# Patient Record
Sex: FEMALE | Race: WHITE | Employment: OTHER | URBAN - METROPOLITAN AREA
[De-identification: names, ages, dates, MRNs, and addresses within clinical notes are randomized per-mention and may not be internally consistent; named-entity substitution may affect disease eponyms.]

---

## 2021-05-25 PROBLEM — N17.9 ACUTE RENAL FAILURE (ARF) (HCC): Status: ACTIVE | Noted: 2021-05-25

## 2021-05-27 ENCOUNTER — APPOINTMENT (OUTPATIENT)
Dept: MRI IMAGING | Age: 76
DRG: 456 | End: 2021-05-27
Attending: INTERNAL MEDICINE
Payer: MEDICARE

## 2021-05-27 ENCOUNTER — APPOINTMENT (OUTPATIENT)
Dept: ULTRASOUND IMAGING | Age: 76
DRG: 456 | End: 2021-05-27
Attending: INTERNAL MEDICINE
Payer: MEDICARE

## 2021-05-27 ENCOUNTER — HOSPITAL ENCOUNTER (INPATIENT)
Age: 76
LOS: 14 days | Discharge: NURSING, REHAB OR LONG-TERM CARE | DRG: 456 | End: 2021-06-10
Attending: INTERNAL MEDICINE | Admitting: INTERNAL MEDICINE
Payer: MEDICARE

## 2021-05-27 DIAGNOSIS — M84.48XA PATHOLOGICAL FRACTURE OF LUMBAR VERTEBRA, INITIAL ENCOUNTER: ICD-10-CM

## 2021-05-27 DIAGNOSIS — G95.29 SPINAL CORD COMPRESSION DUE TO MALIGNANT NEOPLASM METASTATIC TO SPINE (HCC): ICD-10-CM

## 2021-05-27 DIAGNOSIS — C79.51 SPINAL CORD COMPRESSION DUE TO MALIGNANT NEOPLASM METASTATIC TO SPINE (HCC): ICD-10-CM

## 2021-05-27 DIAGNOSIS — S32.020A COMPRESSION FRACTURE OF L2 VERTEBRA, INITIAL ENCOUNTER (HCC): Primary | ICD-10-CM

## 2021-05-27 DIAGNOSIS — C90.00 MULTIPLE MYELOMA NOT HAVING ACHIEVED REMISSION (HCC): ICD-10-CM

## 2021-05-27 PROBLEM — I10 ESSENTIAL HYPERTENSION: Status: ACTIVE | Noted: 2021-05-27

## 2021-05-27 PROBLEM — E03.8 OTHER SPECIFIED HYPOTHYROIDISM: Status: ACTIVE | Noted: 2021-05-27

## 2021-05-27 PROBLEM — S32.000A COMPRESSION FRACTURE OF LUMBAR VERTEBRA (HCC): Status: ACTIVE | Noted: 2021-05-27

## 2021-05-27 PROBLEM — E78.49 OTHER HYPERLIPIDEMIA: Status: ACTIVE | Noted: 2021-05-27

## 2021-05-27 LAB
-: ABNORMAL
ABSOLUTE RETIC #: 0.05 M/UL (ref 0.03–0.08)
ALBUMIN SERPL-MCNC: 3.2 G/DL (ref 3.5–5.2)
ALBUMIN/GLOBULIN RATIO: 0.8 (ref 1–2.5)
ALP BLD-CCNC: 52 U/L (ref 35–104)
ALT SERPL-CCNC: 11 U/L (ref 5–33)
AMORPHOUS: ABNORMAL
ANION GAP SERPL CALCULATED.3IONS-SCNC: 14 MMOL/L (ref 9–17)
AST SERPL-CCNC: 16 U/L
BACTERIA: ABNORMAL
BILIRUB SERPL-MCNC: 0.24 MG/DL (ref 0.3–1.2)
BILIRUBIN URINE: NEGATIVE
BUN BLDV-MCNC: 27 MG/DL (ref 8–23)
BUN/CREAT BLD: ABNORMAL (ref 9–20)
CALCIUM SERPL-MCNC: 9.1 MG/DL (ref 8.6–10.4)
CASTS UA: ABNORMAL /LPF (ref 0–8)
CHLORIDE BLD-SCNC: 105 MMOL/L (ref 98–107)
CO2: 23 MMOL/L (ref 20–31)
COLOR: ABNORMAL
CREAT SERPL-MCNC: 1.53 MG/DL (ref 0.5–0.9)
CREATININE URINE: 54.7 MG/DL (ref 28–217)
CRYSTALS, UA: ABNORMAL /HPF
EPITHELIAL CELLS UA: ABNORMAL /HPF (ref 0–5)
FERRITIN: 292 UG/L (ref 13–150)
FOLATE: >20 NG/ML
FREE KAPPA/LAMBDA RATIO: 0.01 (ref 0.26–1.65)
GFR AFRICAN AMERICAN: 40 ML/MIN
GFR NON-AFRICAN AMERICAN: 33 ML/MIN
GFR SERPL CREATININE-BSD FRML MDRD: ABNORMAL ML/MIN/{1.73_M2}
GFR SERPL CREATININE-BSD FRML MDRD: ABNORMAL ML/MIN/{1.73_M2}
GLUCOSE BLD-MCNC: 154 MG/DL (ref 70–99)
GLUCOSE URINE: NEGATIVE
HCT VFR BLD CALC: 30.1 % (ref 36.3–47.1)
HEMOGLOBIN: 9.5 G/DL (ref 11.9–15.1)
IGA: 2250 MG/DL (ref 70–400)
IGG: <300 MG/DL (ref 700–1600)
IGM: <25 MG/DL (ref 40–230)
IMMATURE RETIC FRACT: 12.4 % (ref 2.7–18.3)
INR BLD: 1.2
IRON SATURATION: 35 % (ref 20–55)
IRON: 75 UG/DL (ref 37–145)
KAPPA FREE LIGHT CHAINS QNT: 0.97 MG/DL (ref 0.37–1.94)
KETONES, URINE: NEGATIVE
LAMBDA FREE LIGHT CHAINS QNT: 136.34 MG/DL (ref 0.57–2.63)
LEUKOCYTE ESTERASE, URINE: ABNORMAL
MAGNESIUM: 1.8 MG/DL (ref 1.6–2.6)
MCH RBC QN AUTO: 28.9 PG (ref 25.2–33.5)
MCHC RBC AUTO-ENTMCNC: 31.6 G/DL (ref 28.4–34.8)
MCV RBC AUTO: 91.5 FL (ref 82.6–102.9)
MUCUS: ABNORMAL
NITRITE, URINE: NEGATIVE
NRBC AUTOMATED: 0 PER 100 WBC
OSMOLALITY URINE: 421 MOSM/KG (ref 80–1300)
OTHER OBSERVATIONS UA: ABNORMAL
PDW BLD-RTO: 13.6 % (ref 11.8–14.4)
PH UA: 7 (ref 5–8)
PLATELET # BLD: 292 K/UL (ref 138–453)
PMV BLD AUTO: 9.8 FL (ref 8.1–13.5)
POTASSIUM SERPL-SCNC: 4.1 MMOL/L (ref 3.7–5.3)
PROTEIN UA: ABNORMAL
PROTHROMBIN TIME: 12.4 SEC (ref 9.1–12.3)
RBC # BLD: 3.29 M/UL (ref 3.95–5.11)
RBC UA: ABNORMAL /HPF (ref 0–4)
RENAL EPITHELIAL, UA: ABNORMAL /HPF
RETIC %: 1.6 % (ref 0.5–1.9)
RETIC HEMOGLOBIN: 30.8 PG (ref 28.2–35.7)
SODIUM BLD-SCNC: 142 MMOL/L (ref 135–144)
SODIUM,UR: 110 MMOL/L
SPECIFIC GRAVITY UA: 1.01 (ref 1–1.03)
THYROXINE, FREE: 1.35 NG/DL (ref 0.93–1.7)
TOTAL IRON BINDING CAPACITY: 215 UG/DL (ref 250–450)
TOTAL PROTEIN, URINE: 354 MG/DL
TOTAL PROTEIN: 7.2 G/DL (ref 6.4–8.3)
TRICHOMONAS: ABNORMAL
TSH SERPL DL<=0.05 MIU/L-ACNC: 7.01 MIU/L (ref 0.3–5)
TURBIDITY: CLEAR
UNSATURATED IRON BINDING CAPACITY: 140 UG/DL (ref 112–347)
URINE HGB: ABNORMAL
UROBILINOGEN, URINE: NORMAL
VITAMIN B-12: 857 PG/ML (ref 232–1245)
WBC # BLD: 7.5 K/UL (ref 3.5–11.3)
WBC UA: ABNORMAL /HPF (ref 0–5)
YEAST: ABNORMAL

## 2021-05-27 PROCEDURE — 83550 IRON BINDING TEST: CPT

## 2021-05-27 PROCEDURE — 80053 COMPREHEN METABOLIC PANEL: CPT

## 2021-05-27 PROCEDURE — A9576 INJ PROHANCE MULTIPACK: HCPCS | Performed by: INTERNAL MEDICINE

## 2021-05-27 PROCEDURE — 2580000003 HC RX 258: Performed by: INTERNAL MEDICINE

## 2021-05-27 PROCEDURE — 76770 US EXAM ABDO BACK WALL COMP: CPT

## 2021-05-27 PROCEDURE — 84156 ASSAY OF PROTEIN URINE: CPT

## 2021-05-27 PROCEDURE — 6360000004 HC RX CONTRAST MEDICATION: Performed by: INTERNAL MEDICINE

## 2021-05-27 PROCEDURE — 83883 ASSAY NEPHELOMETRY NOT SPEC: CPT

## 2021-05-27 PROCEDURE — 85610 PROTHROMBIN TIME: CPT

## 2021-05-27 PROCEDURE — 82746 ASSAY OF FOLIC ACID SERUM: CPT

## 2021-05-27 PROCEDURE — 86334 IMMUNOFIX E-PHORESIS SERUM: CPT

## 2021-05-27 PROCEDURE — 99222 1ST HOSP IP/OBS MODERATE 55: CPT | Performed by: NEUROLOGICAL SURGERY

## 2021-05-27 PROCEDURE — APPSS30 APP SPLIT SHARED TIME 16-30 MINUTES: Performed by: PHYSICIAN ASSISTANT

## 2021-05-27 PROCEDURE — 82728 ASSAY OF FERRITIN: CPT

## 2021-05-27 PROCEDURE — APPSS180 APP SPLIT SHARED TIME > 60 MINUTES: Performed by: NURSE PRACTITIONER

## 2021-05-27 PROCEDURE — 85045 AUTOMATED RETICULOCYTE COUNT: CPT

## 2021-05-27 PROCEDURE — 72156 MRI NECK SPINE W/O & W/DYE: CPT

## 2021-05-27 PROCEDURE — 99223 1ST HOSP IP/OBS HIGH 75: CPT | Performed by: INTERNAL MEDICINE

## 2021-05-27 PROCEDURE — 6370000000 HC RX 637 (ALT 250 FOR IP): Performed by: NURSE PRACTITIONER

## 2021-05-27 PROCEDURE — 84155 ASSAY OF PROTEIN SERUM: CPT

## 2021-05-27 PROCEDURE — 72157 MRI CHEST SPINE W/O & W/DYE: CPT

## 2021-05-27 PROCEDURE — 84443 ASSAY THYROID STIM HORMONE: CPT

## 2021-05-27 PROCEDURE — 84165 PROTEIN E-PHORESIS SERUM: CPT

## 2021-05-27 PROCEDURE — 85027 COMPLETE CBC AUTOMATED: CPT

## 2021-05-27 PROCEDURE — 83540 ASSAY OF IRON: CPT

## 2021-05-27 PROCEDURE — 82570 ASSAY OF URINE CREATININE: CPT

## 2021-05-27 PROCEDURE — 83935 ASSAY OF URINE OSMOLALITY: CPT

## 2021-05-27 PROCEDURE — 1200000000 HC SEMI PRIVATE

## 2021-05-27 PROCEDURE — 82607 VITAMIN B-12: CPT

## 2021-05-27 PROCEDURE — 84166 PROTEIN E-PHORESIS/URINE/CSF: CPT

## 2021-05-27 PROCEDURE — 81001 URINALYSIS AUTO W/SCOPE: CPT

## 2021-05-27 PROCEDURE — 36415 COLL VENOUS BLD VENIPUNCTURE: CPT

## 2021-05-27 PROCEDURE — 82784 ASSAY IGA/IGD/IGG/IGM EACH: CPT

## 2021-05-27 PROCEDURE — 84439 ASSAY OF FREE THYROXINE: CPT

## 2021-05-27 PROCEDURE — 83735 ASSAY OF MAGNESIUM: CPT

## 2021-05-27 PROCEDURE — 6370000000 HC RX 637 (ALT 250 FOR IP): Performed by: INTERNAL MEDICINE

## 2021-05-27 PROCEDURE — 84300 ASSAY OF URINE SODIUM: CPT

## 2021-05-27 RX ORDER — ATORVASTATIN CALCIUM 20 MG/1
20 TABLET, FILM COATED ORAL DAILY
Status: DISCONTINUED | OUTPATIENT
Start: 2021-05-27 | End: 2021-06-10 | Stop reason: HOSPADM

## 2021-05-27 RX ORDER — ACETAMINOPHEN 650 MG/1
650 SUPPOSITORY RECTAL EVERY 6 HOURS PRN
Status: DISCONTINUED | OUTPATIENT
Start: 2021-05-27 | End: 2021-06-10 | Stop reason: HOSPADM

## 2021-05-27 RX ORDER — SODIUM CHLORIDE 9 MG/ML
INJECTION, SOLUTION INTRAVENOUS CONTINUOUS
Status: DISCONTINUED | OUTPATIENT
Start: 2021-05-27 | End: 2021-05-31

## 2021-05-27 RX ORDER — SODIUM CHLORIDE 9 MG/ML
25 INJECTION, SOLUTION INTRAVENOUS PRN
Status: DISCONTINUED | OUTPATIENT
Start: 2021-05-27 | End: 2021-06-10 | Stop reason: HOSPADM

## 2021-05-27 RX ORDER — AMLODIPINE BESYLATE 5 MG/1
5 TABLET ORAL DAILY
Status: ON HOLD | COMMUNITY
End: 2021-06-07 | Stop reason: HOSPADM

## 2021-05-27 RX ORDER — SODIUM CHLORIDE 0.9 % (FLUSH) 0.9 %
5-40 SYRINGE (ML) INJECTION PRN
Status: DISCONTINUED | OUTPATIENT
Start: 2021-05-27 | End: 2021-06-10 | Stop reason: HOSPADM

## 2021-05-27 RX ORDER — LATANOPROST 50 UG/ML
1 SOLUTION/ DROPS OPHTHALMIC NIGHTLY
Status: DISCONTINUED | OUTPATIENT
Start: 2021-05-27 | End: 2021-06-10 | Stop reason: HOSPADM

## 2021-05-27 RX ORDER — SODIUM CHLORIDE 0.9 % (FLUSH) 0.9 %
5-40 SYRINGE (ML) INJECTION EVERY 12 HOURS SCHEDULED
Status: DISCONTINUED | OUTPATIENT
Start: 2021-05-27 | End: 2021-06-10 | Stop reason: HOSPADM

## 2021-05-27 RX ORDER — LATANOPROST 50 UG/ML
1 SOLUTION/ DROPS OPHTHALMIC NIGHTLY
COMMUNITY

## 2021-05-27 RX ORDER — HEPARIN SODIUM 5000 [USP'U]/ML
5000 INJECTION, SOLUTION INTRAVENOUS; SUBCUTANEOUS EVERY 8 HOURS SCHEDULED
Status: DISCONTINUED | OUTPATIENT
Start: 2021-05-27 | End: 2021-06-03

## 2021-05-27 RX ORDER — HYDROCODONE BITARTRATE AND ACETAMINOPHEN 5; 325 MG/1; MG/1
1 TABLET ORAL ONCE
Status: COMPLETED | OUTPATIENT
Start: 2021-05-27 | End: 2021-05-27

## 2021-05-27 RX ORDER — LEVOTHYROXINE SODIUM 0.05 MG/1
50 TABLET ORAL DAILY
Status: DISCONTINUED | OUTPATIENT
Start: 2021-05-27 | End: 2021-06-10 | Stop reason: HOSPADM

## 2021-05-27 RX ORDER — NAPROXEN 500 MG/1
500 TABLET ORAL 2 TIMES DAILY WITH MEALS
Status: ON HOLD | COMMUNITY
End: 2021-06-07 | Stop reason: HOSPADM

## 2021-05-27 RX ORDER — ASPIRIN 81 MG/1
81 TABLET, CHEWABLE ORAL DAILY
Status: ON HOLD | COMMUNITY
End: 2021-06-07 | Stop reason: HOSPADM

## 2021-05-27 RX ORDER — LISINOPRIL 20 MG/1
20 TABLET ORAL DAILY
Status: ON HOLD | COMMUNITY
End: 2021-06-07 | Stop reason: HOSPADM

## 2021-05-27 RX ORDER — SIMVASTATIN 20 MG
20 TABLET ORAL NIGHTLY
COMMUNITY
End: 2021-11-23

## 2021-05-27 RX ORDER — SODIUM CHLORIDE 0.9 % (FLUSH) 0.9 %
10 SYRINGE (ML) INJECTION PRN
Status: DISCONTINUED | OUTPATIENT
Start: 2021-05-27 | End: 2021-06-10 | Stop reason: HOSPADM

## 2021-05-27 RX ORDER — ACETAMINOPHEN 325 MG/1
650 TABLET ORAL EVERY 6 HOURS PRN
Status: DISCONTINUED | OUTPATIENT
Start: 2021-05-27 | End: 2021-06-10 | Stop reason: HOSPADM

## 2021-05-27 RX ORDER — NICOTINE 21 MG/24HR
1 PATCH, TRANSDERMAL 24 HOURS TRANSDERMAL DAILY PRN
Status: DISCONTINUED | OUTPATIENT
Start: 2021-05-27 | End: 2021-06-10 | Stop reason: HOSPADM

## 2021-05-27 RX ORDER — LEVOTHYROXINE SODIUM 0.05 MG/1
50 TABLET ORAL DAILY
COMMUNITY

## 2021-05-27 RX ADMIN — GADOTERIDOL 8 ML: 279.3 INJECTION, SOLUTION INTRAVENOUS at 16:30

## 2021-05-27 RX ADMIN — HYDROCODONE BITARTRATE AND ACETAMINOPHEN 1 TABLET: 5; 325 TABLET ORAL at 22:17

## 2021-05-27 RX ADMIN — ACETAMINOPHEN 650 MG: 325 TABLET ORAL at 21:47

## 2021-05-27 RX ADMIN — SODIUM CHLORIDE: 9 INJECTION, SOLUTION INTRAVENOUS at 03:53

## 2021-05-27 RX ADMIN — LATANOPROST 1 DROP: 50 SOLUTION OPHTHALMIC at 22:17

## 2021-05-27 RX ADMIN — ATORVASTATIN CALCIUM 20 MG: 20 TABLET, FILM COATED ORAL at 22:16

## 2021-05-27 ASSESSMENT — PAIN DESCRIPTION - ORIENTATION
ORIENTATION: LOWER

## 2021-05-27 ASSESSMENT — PAIN DESCRIPTION - DESCRIPTORS
DESCRIPTORS: ACHING
DESCRIPTORS: ACHING

## 2021-05-27 ASSESSMENT — PAIN DESCRIPTION - LOCATION
LOCATION: BACK

## 2021-05-27 ASSESSMENT — PAIN SCALES - GENERAL
PAINLEVEL_OUTOF10: 5
PAINLEVEL_OUTOF10: 5
PAINLEVEL_OUTOF10: 10
PAINLEVEL_OUTOF10: 10
PAINLEVEL_OUTOF10: 5

## 2021-05-27 ASSESSMENT — PAIN DESCRIPTION - FREQUENCY
FREQUENCY: CONTINUOUS
FREQUENCY: CONTINUOUS

## 2021-05-27 ASSESSMENT — PAIN DESCRIPTION - PAIN TYPE
TYPE: ACUTE PAIN

## 2021-05-27 ASSESSMENT — PAIN DESCRIPTION - PROGRESSION: CLINICAL_PROGRESSION: NOT CHANGED

## 2021-05-27 ASSESSMENT — PAIN - FUNCTIONAL ASSESSMENT: PAIN_FUNCTIONAL_ASSESSMENT: PREVENTS OR INTERFERES SOME ACTIVE ACTIVITIES AND ADLS

## 2021-05-27 ASSESSMENT — PAIN DESCRIPTION - ONSET: ONSET: ON-GOING

## 2021-05-27 NOTE — H&P
[facility]  Office: [address], DO, Abi Triplett DO, Caitlyn Cordero DO, Ariel Lindsaycornel Campbell, DO, Nikko Muñoz MD, Celia Blair MD, Mykel Mcmillan MD, Srinivasa Banerjee MD, Ania Jimenez MD, Shaneka Espinoza MD, Mela Mansfield MD, Cristel Zazueta MD, Manjula Phelps DO, Arnoldo Shannon MD, Tito Longoria DO, Zen Morse MD,  Sheila Parish DO, Ana Scott MD, Grupo Nettles MD, Johnny Fontenot MD, Ignacia Lanier MD, [facility] Analy, [facility], [address], [facility], Cecilia Callejas, CNP, Nemesio Meadows, CNS, Beto العلي, CNP, Ravin Arora, CNP, Fredy Castaneda, CNP, Jeison Churchill, CNP, Omaira Rivas, CNP, Haley Willson PA-C, Bola Raygoza, [facility], Shanthi Streeter, CNP, Brianda Rooney, CNP, Jhon Schaefer, CNP, Grzegorz Jazz, CNP, Tom Shaw, CNP, Lissa Dodson, [facility]    HISTORY AND PHYSICAL EXAMINATION            Date:   5/27/2021  Patient name:  Daryn Love  Date of admission:  5/27/2021  2:18 AM  MRN:   [id number]  Account:  [de-identified]  YOB: 1945  PCP:    No primary care provider on file. Room:   [address] [place] [address]  Code Status:    Full Code    Chief Complaint:     Back pain    History Obtained From:     patient, spouse, family member -daughter    History of Present Illness:     Daryn Love is a 77 y.o. Non-/non  female who presents with No chief complaint on file. and is admitted to the hospital for the management of Compression fracture of lumbar vertebra ([address] Patient presents to the emergency department at [facility] with concerns over increasing back pain. Per patient she was recently treated for a UTI with right ureteral stone with Bactrim for 7 days and had follow-up scan for worsening back pain.       Initial CT was completed 5/12 and follow-up MRI was done showing burst L2 fracture with a 6 mm retropulsion of the spinal canal moderate canal narrowing. There was also a lucency within the right side of the collapsed vertebral body with concern for possible pathologic fracture    Follow-up MRI identified pathologic compression fracture of L2 vertebral body with soft tissue extending into the spinal canal with severe spinal canal stenosis    Patient was transferred to our facility for neurosurgery evaluation and admission    On assessment patient is awake and alert sitting in bed in no acute distress. Vital signs are stable. No acute events since transfer to our facility. Labs from this morning pending. Managing from sending facility requested. Medication list clarified. Patient does not endorse any chest pain, shortness of breath, nausea, vomiting, diarrhea, constipation, fever, chills, urinary symptoms have resolved, there is some mild pain in her lumbar region without numbness or tingling in her bilateral lower extremities. She maintains bowel and bladder sensation. Endorses some left upper extremity numbness and tingling in her pinky finger. Medical, surgical history reviewed. Past Medical History:     Past Medical History:   Diagnosis Date    Acute renal failure (ARF) ([facility] 5/25/2021    Compression fracture of lumbar vertebra ([facility] 5/27/2021    Essential hypertension 5/27/2021    Other hyperlipidemia 5/27/2021    Other specified hypothyroidism 5/27/2021        Past Surgical History:     Past Surgical History:   Procedure Laterality Date    FINGER TRIGGER RELEASE Left         Medications Prior to Admission:     Prior to Admission medications    Medication Sig Start Date End Date Taking?  Authorizing Provider   naproxen (NAPROSYN) 500 MG tablet Take 500 mg by mouth 2 times daily (with meals)   Yes Historical Provider, MD   latanoprost (XALATAN) 0.005 % ophthalmic solution Place 1 drop into both eyes nightly   Yes Historical Provider, MD   levothyroxine (SYNTHROID) 50 MCG tablet Take 50 mcg by mouth Daily   Yes Historical Provider, MD lisinopril (PRINIVIL;ZESTRIL) 20 MG tablet Take 20 mg by mouth daily   Yes Historical Provider, MD   simvastatin (ZOCOR) 20 MG tablet Take 20 mg by mouth nightly   Yes Historical Provider, MD   amLODIPine (NORVASC) 5 MG tablet Take 5 mg by mouth daily   Yes Historical Provider, MD   aspirin 81 MG chewable tablet Take 81 mg by mouth daily   Yes Historical Provider, MD        Allergies:     Patient has no known allergies. Social History:     Tobacco:    reports that she has never smoked. She has never used smokeless tobacco.  Alcohol:      reports previous alcohol use. Drug Use:  reports no history of drug use. Family History:     Family History   Problem Relation Age of Onset    No Known Problems Mother     No Known Problems Father        Review of Systems:     Positive and Negative as described in HPI.     CONSTITUTIONAL:  negative for fevers, chills, sweats, fatigue, weight loss  HEENT:  negative for vision, hearing changes, runny nose, throat pain  RESPIRATORY:  negative for shortness of breath, cough, congestion, wheezing  CARDIOVASCULAR:  negative for chest pain, palpitations  GASTROINTESTINAL:  negative for nausea, vomiting, diarrhea, constipation, change in bowel habits, abdominal pain   GENITOURINARY:  negative for difficulty of urination, burning with urination, frequency   INTEGUMENT:  negative for rash, skin lesions, easy bruising   HEMATOLOGIC/LYMPHATIC:  negative for swelling/edema   ALLERGIC/IMMUNOLOGIC:  negative for urticaria , itching  ENDOCRINE:  negative increase in drinking, increase in urination, hot or cold intolerance  MUSCULOSKELETAL:  negative joint pains, muscle aches, swelling of joints  NEUROLOGICAL:  negative for headaches, dizziness, lightheadedness, numbness, pain, tingling extremities  BEHAVIOR/PSYCH:  negative for depression, anxiety    Physical Exam:   BP (!) 140/85   Pulse 76   Temp 97.8 °F (36.6 °C) (Oral)   Resp 17   Ht 5' (1.524 m)   Wt 93 lb 11.1 oz (42.5 kg) SpO2 96%   BMI 18.30 kg/m²   Temp (24hrs), Av.1 °F (36.7 °C), Min:97.8 °F (36.6 °C), Max:98.2 °F (36.8 °C)    No results for input(s): POCGLU in the last 72 hours.   No intake or output data in the 24 hours ending 21 1140    General Appearance: alert, well appearing, and in no acute distress  Mental status: oriented to person, place, and time  Head: normocephalic, atraumatic  Eye: no icterus, redness, pupils equal and reactive, extraocular eye movements intact, conjunctiva clear  Ear: normal external ear, no discharge, hearing intact  Nose: no drainage noted  Mouth: mucous membranes moist  Neck: supple, no carotid bruits, thyroid not palpable  Lungs: Bilateral equal air entry, clear to ausculation, no wheezing, rales or rhonchi, normal effort  Cardiovascular: normal rate, regular rhythm, no murmur, gallop, rub  Abdomen: Soft, nontender, nondistended, normal bowel sounds, no hepatomegaly or splenomegaly  Neurologic: There are no new focal motor or sensory deficits, normal muscle tone and bulk, no abnormal sensation, normal speech, cranial nerves II through XII grossly intact  Skin: No gross lesions, rashes, bruising or bleeding on exposed skin area  Extremities: peripheral pulses palpable, no pedal edema or calf pain with palpation  Psych: normal affect    Investigations:      Laboratory Testing:  Recent Results (from the past 24 hour(s))   Urinalysis with microscopic    Collection Time: 21  4:09 AM   Result Value Ref Range    Color, UA ORANGE (A) YELLOW    Turbidity UA CLEAR CLEAR    Glucose, Ur NEGATIVE NEGATIVE    Bilirubin Urine NEGATIVE NEGATIVE    Ketones, Urine NEGATIVE NEGATIVE    Specific Gravity, UA 1.014 1.005 - 1.030    Urine Hgb LARGE (A) NEGATIVE    pH, UA 7.0 5.0 - 8.0    Protein, UA 2+ (A) NEGATIVE    Urobilinogen, Urine Normal Normal    Nitrite, Urine NEGATIVE NEGATIVE    Leukocyte Esterase, Urine TRACE (A) NEGATIVE    -          WBC, UA 10 TO 20 0 - 5 /HPF    RBC, UA TOO NUMEROUS TO COUNT 0 - 4 /HPF    Casts UA  0 - 8 /LPF     2 TO 5 HYALINE Reference range defined for non-centrifuged specimen. Crystals, UA NOT REPORTED None /HPF    Epithelial Cells UA 0 TO 2 0 - 5 /HPF    Renal Epithelial, UA NOT REPORTED 0 /HPF    Bacteria, UA NOT REPORTED None    Mucus, UA NOT REPORTED None    Trichomonas, UA NOT REPORTED None    Amorphous, UA NOT REPORTED None    Other Observations UA NOT REPORTED NOT REQ.     Yeast, UA NOT REPORTED None   Sodium, urine, random    Collection Time: 05/27/21  4:09 AM   Result Value Ref Range    Sodium,Ur 110 mmol/L   Protein, urine, random    Collection Time: 05/27/21  4:09 AM   Result Value Ref Range    Total Protein, Urine 354 mg/dL   Osmolality, urine    Collection Time: 05/27/21  4:09 AM   Result Value Ref Range    Osmolality, Ur 421 80 - 1300 mOsm/kg   Creatinine, urine, random    Collection Time: 05/27/21  4:09 AM   Result Value Ref Range    Creatinine, Ur 54.7 28.0 - 217.0 mg/dL   CBC    Collection Time: 05/27/21  9:44 AM   Result Value Ref Range    WBC 7.5 3.5 - 11.3 k/uL    RBC 3.29 (L) 3.95 - 5.11 m/uL    Hemoglobin 9.5 (L) 11.9 - 15.1 g/dL    Hematocrit 30.1 (L) 36.3 - 47.1 %    MCV 91.5 82.6 - 102.9 fL    MCH 28.9 25.2 - 33.5 pg    MCHC 31.6 28.4 - 34.8 g/dL    RDW 13.6 11.8 - 14.4 %    Platelets 890 520 - 257 k/uL    MPV 9.8 8.1 - 13.5 fL    NRBC Automated 0.0 0.0 per 100 WBC   Comprehensive Metabolic Panel w/ Reflex to MG    Collection Time: 05/27/21  9:44 AM   Result Value Ref Range    Glucose 154 (H) 70 - 99 mg/dL    BUN 27 (H) 8 - 23 mg/dL    CREATININE 1.53 (H) 0.50 - 0.90 mg/dL    Bun/Cre Ratio NOT REPORTED 9 - 20    Calcium 9.1 8.6 - 10.4 mg/dL    Sodium 142 135 - 144 mmol/L    Potassium 4.1 3.7 - 5.3 mmol/L    Chloride 105 98 - 107 mmol/L    CO2 23 20 - 31 mmol/L    Anion Gap 14 9 - 17 mmol/L    Alkaline Phosphatase 52 35 - 104 U/L    ALT 11 5 - 33 U/L    AST 16 <32 U/L    Total Bilirubin 0.24 (L) 0.3 - 1.2 mg/dL    Total Protein 7.2 6.4 - 8.3 g/dL Albumin 3.2 (L) 3.5 - 5.2 g/dL    Albumin/Globulin Ratio 0.8 (L) 1.0 - 2.5    GFR Non- 33 (L) >60 mL/min    GFR African American 40 (L) >60 mL/min    GFR Comment          GFR Staging NOT REPORTED    Magnesium    Collection Time: 05/27/21  9:44 AM   Result Value Ref Range    Magnesium 1.8 1.6 - 2.6 mg/dL   Protime-INR    Collection Time: 05/27/21  9:44 AM   Result Value Ref Range    Protime 12.4 (H) 9.1 - 12.3 sec    INR 1.2        Imaging/Diagnostics:  No results found. Assessment :      Hospital Problems         Last Modified POA    * (Principal) Compression fracture of lumbar vertebra ([facility] 5/27/2021 Yes    Acute renal failure (ARF) ([facility] [address] 5/27/2021 Yes    Essential hypertension 5/27/2021 Yes    Other hyperlipidemia 5/27/2021 Yes    Other specified hypothyroidism 5/27/2021 Yes          Plan:     Patient status inpatient in the Progressive Unit/Step down    1. L2 burst pathologic lumbar fracture: Neurosurgery consulted on admission.    -Patient had CT and MRI completed at outlying facility where radiologist recommended evaluation in ER.    -Bowel/bladder function intact    2. Acute kidney injury: Creatinine 1.53 on admission.   - Continue IV fluids.    -Avoid nephrotoxic medication.   -Patient was recently treated with Bactrim for UTI. -Renal ultrasound reviewed without obstruction.   - Hold naproxen, lisinopril home meds    3. Anemia: Check iron studies.    -Hemoglobin 9.5 on admission    4. Protein calorie malnutrition: BMI 18.3 on admission. Albumin 3.2  -Supplements with meals  -consult dietitian    5. Essential hypertension: Resume home medications and monitor    6. Hyperlipidemia: Statin therapy    7. Hypothyroid: Check TSH/T4 on admission.   - Continue home dose of levothyroxine. 8. History of glaucoma: Continue eyedrops     9. GI/DVT prophylaxis: Pepcid, heparin 3 times daily    10.  PT, OT post neurosurgery valuation    Consultations:   IP CONSULT TO NEUROSURGERY  IP CONSULT TO

## 2021-05-27 NOTE — CONSULTS
Department of Neurosurgery                                                        MARIBELL Consult Note      Reason for Consult:  Abnormal spine MRI, mass, fracture   Requesting Physician:  Kareen Randall  Neurosurgeon:   [] Dr. Ubaldo Peace  [] Dr. Adela Williamson  [] Dr. Kevin Vegas  [x] Dr. Negar Christian      History Obtained From:  patient    CHIEF COMPLAINT:         No chief complaint on file. HISTORY OF PRESENT ILLNESS:       The patient is a 77 y.o. female who presents from [facility] facility for evaluation of L2 pathologic fracture. She has been complaining of worsening back pain over the last 6 months with altered sensation to both of her thighs. She denies weakness in her arms and legs. She admits to numbness in 5th digits of both hands She admits to intermittent neck pain as well. She admits to weight loss over the last several months due to decreased appetite and pain in her lower back with standing long periods of time to cook a meal. She denies loss of bowel and bladder function. PAST MEDICAL HISTORY :       Past Medical History:    No past medical history on file. Past Surgical History:    No past surgical history on file.     Social History:   Social History     Socioeconomic History    Marital status:      Spouse name: Not on file    Number of children: Not on file    Years of education: Not on file    Highest education level: Not on file   Occupational History    Not on file   Tobacco Use    Smoking status: Not on file   Substance and Sexual Activity    Alcohol use: Not on file    Drug use: Not on file    Sexual activity: Not on file   Other Topics Concern    Not on file   Social History Narrative    Not on file     Social Determinants of Health     Financial Resource Strain:     Difficulty of Paying Living Expenses:    Food Insecurity:     Worried About Running Out of Food in the Last Year:     [address] in the Last Year:    Transportation Needs:     Lack of Transportation (Medical):  Lack of Transportation (Non-Medical):    Physical Activity:     Days of Exercise per Week:     Minutes of Exercise per Session:    Stress:     Feeling of Stress :    Social Connections:     Frequency of Communication with Friends and Family:     Frequency of Social Gatherings with Friends and Family:     Attends [employer] Services:     Active Member of Clubs or Organizations:     Attends Club or Organization Meetings:     Marital Status:    Intimate Partner Violence:     Fear of Current or Ex-Partner:     Emotionally Abused:     Physically Abused:     Sexually Abused:        Family History:   No family history on file. Allergies:  Patient has no known allergies.     Home Medications:  Prior to Admission medications    Not on File       Current Medications:   Current Facility-Administered Medications: 0.9 % sodium chloride infusion, , Intravenous, Continuous  sodium chloride flush 0.9 % injection 5-40 mL, 5-40 mL, Intravenous, 2 times per day  sodium chloride flush 0.9 % injection 5-40 mL, 5-40 mL, Intravenous, PRN  0.9 % sodium chloride infusion, 25 mL, Intravenous, PRN  nicotine (NICODERM CQ) 21 MG/24HR 1 patch, 1 patch, Transdermal, Daily PRN  acetaminophen (TYLENOL) tablet 650 mg, 650 mg, Oral, Q6H PRN **OR** acetaminophen (TYLENOL) suppository 650 mg, 650 mg, Rectal, Q6H PRN  heparin (porcine) injection 5,000 Units, 5,000 Units, Subcutaneous, 3 times per day    REVIEW OF SYSTEMS:       CONSTITUTIONAL: negative for fatigue and malaise   EYES: negative for double vision and photophobia    HEENT: negative for tinnitus and sore throat   RESPIRATORY: negative for cough, shortness of breath   CARDIOVASCULAR: negative for chest pain, palpitations   GASTROINTESTINAL: negative for nausea, vomiting   GENITOURINARY: negative for incontinence   MUSCULOSKELETAL: negative for neck pain, positive for back pain   NEUROLOGICAL: negative for seizures   PSYCHIATRIC: negative for agitated     Review of systems otherwise negative. PHYSICAL EXAM:       BP (!) 156/89   Pulse 80   Temp 98.2 °F (36.8 °C) (Oral)   Resp 16   Ht 5' (1.524 m)   Wt 93 lb 11.1 oz (42.5 kg)   SpO2 97%   BMI 18.30 kg/m²       CONSTITUTIONAL: no apparent distress, appears stated age   HEAD: normocephalic, atraumatic   ENT: moist mucous membranes, uvula midline   NECK: supple, symmetric, no midline tenderness to palpation   BACK: without midline tenderness, step-offs or deformities   NEUROLOGIC:    Mental Status:  Awake, alert, NAD                           Oriented to person, place, time    Cranial Nerves:    cranial nerves II-XII are grossly intact    Motor Exam:    Drift:  absent  Tone:  normal    Motor exam is symmetrical 5 out of 5 all extremities bilaterally    Sensory:    Right Upper Extremity:  normal  Left Upper Extremity:  normal  Right Lower Extremity:  abnormal - decreased to light touch anterior aspect above the knee   Left Lower Extremity:  abnormal - decreased to light touch anterior aspect above the knee     Deep Tendon Reflexes:    Right Bicep:  2+  Left Bicep:  2+  Right Knee:  2+  Left Knee:  2+         LABS AND IMAGING:     CBC with Differential:  No results found for: WBC, RBC, HGB, HCT, PLT, MCV, MCH, MCHC, RDW, NRBC, SEGSPCT, BANDSPCT, BLASTSPCT, METASPCT, LYMPHOPCT, PROMYELOPCT, MONOPCT, MYELOPCT, EOSPCT, BASOPCT, MONOSABS, LYMPHSABS, EOSABS, BASOSABS, DIFFTYPE  BMP:  No results found for: NA, K, CL, CO2, BUN, LABALBU, CREATININE, CALCIUM, GFRAA, LABGLOM, GLUCOSE    Radiology Review:  US RENAL COMPLETE    Result Date: 5/27/2021  EXAMINATION: RETROPERITONEAL ULTRASOUND OF THE KIDNEYS AND URINARY BLADDER 5/27/2021 COMPARISON: None HISTORY: ORDERING SYSTEM PROVIDED HISTORY: ARF TECHNOLOGIST PROVIDED HISTORY: US RETROPERITONEAL COMPLETE ARF FINDINGS: Kidneys: The right kidney measures 10.0 x 4.0 x 3.5 cm and the left kidney measures 10.5 x 3.3 x 5.2 cm. Cortical thickness within normal limits bilaterally.  There is diffuse increased renal parenchymal echogenicity bilaterally with increased prominence of the relative hypoechoic renal pyramids. A tiny 7 x 5 x 6 mm right lower pole renal cyst is noted. No other focal renal lesion. No hydronephrosis in either kidney. No sonographic evidence for renal calculi. Bladder: Unremarkable appearance of the bladder. No dilated distal ureters. The patient did not have the urge to void. Bilateral ureteral jets were identified. Diffuse increased renal parenchymal echogenicity bilaterally consistent with medical renal disease. No hydronephrosis in either kidney. ASSESSMENT AND PLAN:       Patient Active Problem List   Diagnosis    Acute renal failure (ARF) (HCC)    Compression fracture of lumbar vertebra (HCC)    Essential hypertension    Other hyperlipidemia    Other specified hypothyroidism         A/P:  This is a 77 y.o. female with pathological fracture L2    Patient care will be discussed with attending, will reevaluated patient along with attending.      - Obtain CT chest abdomen and pelvis  - Obtain MRI cervical and thoracic   - Consult to hem onc   - Order TLSO brace   - Continue to hold ASA      Additional recommendations to follow review of imaging     Please contact neurosurgery with any changes in patients neurologic status. Thank you for your consult.        Arnulfo Atkins pager [phone]  5/27/2021  8:37 AM

## 2021-05-27 NOTE — PLAN OF CARE
Problem: Nutrition  Goal: Optimal nutrition therapy  Outcome: Ongoing  Note: Nutrition Problem #1: Severe malnutrition  Intervention: Food and/or Nutrient Delivery: Modify Current Diet, Start Oral Nutrition Supplement  Nutritional Goals: Meet >50% of estimated nutrient needs

## 2021-05-27 NOTE — PROGRESS NOTES
Physician Progress Note      Rebecca Shen  [facility] #:                  [id number]  :                       1945  ADMIT DATE:       2021 2:18 AM  DISCH DATE:  RESPONDING  PROVIDER #:        Analia Valerio NP          QUERY TEXT:    Pt admitted with pathological fracture of L2 and has protein calorie   malnutrition documented. Per dietitian consult patient meets criteria for   severe malnutrition. Please further specify type of malnutrition with   documentation in the medical record. The medical record reflects the following:  Risk Factors: age, pain  Clinical Indicators: BMI 18.3  Aspen Criteria:  Energy Intake: 75% or less estimated energy requirements for 1 month or longer  Weight Loss: 13% wt loss in 7 months  Body Fat Loss: Severe body fat loss, orbital  Muscle Mass Loss: Severe muscle mass loss, clavicles, temples, hand  Treatment: Dietitian consult, Oral supplements    Thank you, Vern Condon RN, CDS. Please call with any questions. [phone]   (cell)  Options provided:  -- Severe Protein calorie malnutrition  -- Severe Malnutrition  -- Other - I will add my own diagnosis  -- Disagree - Not applicable / Not valid  -- Disagree - Clinically unable to determine / Unknown  -- Refer to Clinical Documentation Reviewer    PROVIDER RESPONSE TEXT:    This patient has severe malnutrition.     Query created by: Jose Daniel Andrew on 2021 2:20 PM      Electronically signed by:  Raciel Valerio NP 2021 3:22 PM

## 2021-05-27 NOTE — CONSULTS
Today's Date: 5/27/2021  Patient Name: Delicia Payan  Date of admission: 5/27/2021  2:18 AM  Patient's age: 77 y. o., 1945  Admission Dx: Acute renal failure (ARF) ([facility] [address] [N17.9]    Reason for Consult: management recommendations  Requesting Physician: Fozia Carrizales DO    CHIEF COMPLAINT: Compression fracture of lumbar vertebra    History Obtained From:  patient    HISTORY OF PRESENT ILLNESS:      The patient is a 77 y.o. female with no significant past medical history, she initially presented to the ER at [facility] facility for worsening lower back pain, was found to have UTI with right ureteral stone and was treated with Bactrim for 7 days. Patient's back pain continued to worsen so follow-up MRI was done which showed burst L2 fracture with a 6 mm retropulsion of spinal canal with moderate canal narrowing. Patient was then transferred to [facility] for neurosurgery evaluation and admission. Currently, patient is awake alert sitting comfortably no acute distress. Vitals and labs reviewed patient admits to decreased appetite and weight loss since January. Neurosurgery has been consulted, will follow up on repeat CT chest abdomen and pelvis and MRI cervical and thoracic spine. Vitals stable  Labs reviewed. Creatinine 1.53, calcium 9.1, Hb 9.5    Past Medical History:   has a past medical history of Acute renal failure (ARF) (HCC), Compression fracture of lumbar vertebra ([facility] [address] Essential hypertension, Other hyperlipidemia, and Other specified hypothyroidism. Past Surgical History:   has a past surgical history that includes Finger trigger release (Left). Medications:    Prior to Admission medications    Medication Sig Start Date End Date Taking?  Authorizing Provider   naproxen (NAPROSYN) 500 MG tablet Take 500 mg by mouth 2 times daily (with meals)   Yes Historical Provider, MD   latanoprost (XALATAN) 0.005 % ophthalmic solution Place 1 drop into both eyes nightly   Yes Historical Provider, MD   levothyroxine (SYNTHROID) 50 MCG tablet Take 50 mcg by mouth Daily   Yes Historical Provider, MD   lisinopril (PRINIVIL;ZESTRIL) 20 MG tablet Take 20 mg by mouth daily   Yes Historical Provider, MD   simvastatin (ZOCOR) 20 MG tablet Take 20 mg by mouth nightly   Yes Historical Provider, MD   amLODIPine (NORVASC) 5 MG tablet Take 5 mg by mouth daily   Yes Historical Provider, MD   aspirin 81 MG chewable tablet Take 81 mg by mouth daily   Yes Historical Provider, MD     Current Facility-Administered Medications   Medication Dose Route Frequency Provider Last Rate Last Admin    0.9 % sodium chloride infusion   Intravenous Continuous Gabriella Tang MD 75 mL/hr at 05/27/21 0353 New Bag at 05/27/21 0353    sodium chloride flush 0.9 % injection 5-40 mL  5-40 mL Intravenous 2 times per day Gabriella Tang MD        sodium chloride flush 0.9 % injection 5-40 mL  5-40 mL Intravenous PRN Gabriella Tang MD        0.9 % sodium chloride infusion  25 mL Intravenous PRN Gabriella Tang MD        nicotine (NICODERM CQ) 21 MG/24HR 1 patch  1 patch Transdermal Daily PRN Gabriella Tang MD        acetaminophen (TYLENOL) tablet 650 mg  650 mg Oral Q6H PRN Gabriella Tang MD        Or    acetaminophen (TYLENOL) suppository 650 mg  650 mg Rectal Q6H PRN Gabriella Tang MD        heparin (porcine) injection 5,000 Units  5,000 Units Subcutaneous 3 times per day Gabriella Tang MD        latanoprost (XALATAN) 0.005 % ophthalmic solution 1 drop  1 drop Both Eyes Nightly BRIA Grady NP        levothyroxine (SYNTHROID) tablet 50 mcg  50 mcg Oral Daily BRIA Grady NP        atorvastatin (LIPITOR) tablet 20 mg  20 mg Oral Daily BRIA Grady NP           Allergies:  Patient has no known allergies. Social History:   reports that she has never smoked. She has never used smokeless tobacco. She reports previous alcohol use. She reports that she does not use drugs.      Family History: family history includes No Known Problems in her father and mother. REVIEW OF SYSTEMS:      Constitutional: No fever or chills. No night sweats, no weight loss   Eyes: No eye discharge, double vision, or eye pain   HEENT: negative for sore mouth, sore throat, hoarseness and voice change   Respiratory: negative for cough , sputum, dyspnea, wheezing, hemoptysis, chest pain   Cardiovascular: negative for chest pain, dyspnea, palpitations, orthopnea, PND   Gastrointestinal: negative for nausea, vomiting, diarrhea, constipation, abdominal pain, Dysphagia, hematemesis and hematochezia   Genitourinary: negative for frequency, dysuria, nocturia, urinary incontinence, and hematuria   Integument: negative for rash, skin lesions, bruises.    Hematologic/Lymphatic: negative for easy bruising, bleeding, lymphadenopathy, or petechiae   Endocrine: negative for heat or cold intolerance,weight changes, change in bowel habits and hair loss   Musculoskeletal: negative for myalgias, arthralgias, pain, joint swelling,and bone pain   Neurological: negative for headaches, dizziness, seizures, weakness, numbness    PHYSICAL EXAM:        BP (!) 140/85   Pulse 76   Temp 97.8 °F (36.6 °C) (Oral)   Resp 17   Ht 5' (1.524 m)   Wt 93 lb 11.1 oz (42.5 kg)   SpO2 96%   BMI 18.30 kg/m²    Temp (24hrs), Av.1 °F (36.7 °C), Min:97.8 °F (36.6 °C), Max:98.2 °F (36.8 °C)      General appearance - well appearing, no in pain or distress   Mental status - alert and cooperative   Eyes - pupils equal and reactive, extraocular eye movements intact   Ears - bilateral TM's and external ear canals normal   Mouth - mucous membranes moist, pharynx normal without lesions   Neck - supple, no significant adenopathy   Lymphatics - no palpable lymphadenopathy, no hepatosplenomegaly   Chest - clear to auscultation, no wheezes, rales or rhonchi, symmetric air entry   Heart - normal rate, regular rhythm, normal S1, S2, no murmurs  Abdomen - soft, nontender, nondistended, no masses or organomegaly   Neurological - alert, oriented, normal speech, no focal findings or movement disorder noted   Musculoskeletal - no joint tenderness, deformity or swelling   Extremities - peripheral pulses normal, no pedal edema, no clubbing or cyanosis   Skin - normal coloration and turgor, no rashes, no suspicious skin lesions noted ,      DATA:      Labs:     Results for orders placed or performed during the hospital encounter of 05/27/21   Urinalysis with microscopic   Result Value Ref Range    Color, UA ORANGE (A) YELLOW    Turbidity UA CLEAR CLEAR    Glucose, Ur NEGATIVE NEGATIVE    Bilirubin Urine NEGATIVE NEGATIVE    Ketones, Urine NEGATIVE NEGATIVE    Specific Gravity, UA 1.014 1.005 - 1.030    Urine Hgb LARGE (A) NEGATIVE    pH, UA 7.0 5.0 - 8.0    Protein, UA 2+ (A) NEGATIVE    Urobilinogen, Urine Normal Normal    Nitrite, Urine NEGATIVE NEGATIVE    Leukocyte Esterase, Urine TRACE (A) NEGATIVE    -          WBC, UA 10 TO 20 0 - 5 /HPF    RBC, UA TOO NUMEROUS TO COUNT 0 - 4 /HPF    Casts UA  0 - 8 /LPF     2 TO 5 HYALINE Reference range defined for non-centrifuged specimen. Crystals, UA NOT REPORTED None /HPF    Epithelial Cells UA 0 TO 2 0 - 5 /HPF    Renal Epithelial, UA NOT REPORTED 0 /HPF    Bacteria, UA NOT REPORTED None    Mucus, UA NOT REPORTED None    Trichomonas, UA NOT REPORTED None    Amorphous, UA NOT REPORTED None    Other Observations UA NOT REPORTED NOT REQ.     Yeast, UA NOT REPORTED None   Sodium, urine, random   Result Value Ref Range    Sodium,Ur 110 mmol/L   Protein, urine, random   Result Value Ref Range    Total Protein, Urine 354 mg/dL   Osmolality, urine   Result Value Ref Range    Osmolality, Ur 421 80 - 1300 mOsm/kg   Creatinine, urine, random   Result Value Ref Range    Creatinine, Ur 54.7 28.0 - 217.0 mg/dL   CBC   Result Value Ref Range    WBC 7.5 3.5 - 11.3 k/uL    RBC 3.29 (L) 3.95 - 5.11 m/uL    Hemoglobin 9.5 (L) 11.9 - 15.1 g/dL    Hematocrit 30.1 (L) 36.3 - 47.1 %    MCV 91.5 82.6 - 102.9 fL    MCH 28.9 25.2 - 33.5 pg    MCHC 31.6 28.4 - 34.8 g/dL    RDW 13.6 11.8 - 14.4 %    Platelets 004 985 - 211 k/uL    MPV 9.8 8.1 - 13.5 fL    NRBC Automated 0.0 0.0 per 100 WBC   Comprehensive Metabolic Panel w/ Reflex to MG   Result Value Ref Range    Glucose 154 (H) 70 - 99 mg/dL    BUN 27 (H) 8 - 23 mg/dL    CREATININE 1.53 (H) 0.50 - 0.90 mg/dL    Bun/Cre Ratio NOT REPORTED 9 - 20    Calcium 9.1 8.6 - 10.4 mg/dL    Sodium 142 135 - 144 mmol/L    Potassium 4.1 3.7 - 5.3 mmol/L    Chloride 105 98 - 107 mmol/L    CO2 23 20 - 31 mmol/L    Anion Gap 14 9 - 17 mmol/L    Alkaline Phosphatase 52 35 - 104 U/L    ALT 11 5 - 33 U/L    AST 16 <32 U/L    Total Bilirubin 0.24 (L) 0.3 - 1.2 mg/dL    Total Protein 7.2 6.4 - 8.3 g/dL    Albumin 3.2 (L) 3.5 - 5.2 g/dL    Albumin/Globulin Ratio 0.8 (L) 1.0 - 2.5    GFR Non- 33 (L) >60 mL/min    GFR African American 40 (L) >60 mL/min    GFR Comment          GFR Staging NOT REPORTED    Magnesium   Result Value Ref Range    Magnesium 1.8 1.6 - 2.6 mg/dL   Protime-INR   Result Value Ref Range    Protime 12.4 (H) 9.1 - 12.3 sec    INR 1.2    Reticulocytes   Result Value Ref Range    Retic % 1.6 0.5 - 1.9 %    Absolute Retic # 0.050 0.030 - 0.080 M/uL    Immature Retic Fract 12.400 2.7 - 18.3 %    Retic Hemoglobin 30.8 28.2 - 35.7 pg         IMAGING DATA:    US RENAL COMPLETE    Result Date: 5/27/2021  EXAMINATION: RETROPERITONEAL ULTRASOUND OF THE KIDNEYS AND URINARY BLADDER 5/27/2021 COMPARISON: None HISTORY: ORDERING SYSTEM PROVIDED HISTORY: ARF TECHNOLOGIST PROVIDED HISTORY: US RETROPERITONEAL COMPLETE ARF FINDINGS: Kidneys: The right kidney measures 10.0 x 4.0 x 3.5 cm and the left kidney measures 10.5 x 3.3 x 5.2 cm. Cortical thickness within normal limits bilaterally. There is diffuse increased renal parenchymal echogenicity bilaterally with increased prominence of the relative hypoechoic renal pyramids.   A tiny 7 x 5 x 6 mm right lower pole renal cyst is noted. No other focal renal lesion. No hydronephrosis in either kidney. No sonographic evidence for renal calculi. Bladder: Unremarkable appearance of the bladder. No dilated distal ureters. The patient did not have the urge to void. Bilateral ureteral jets were identified. Diffuse increased renal parenchymal echogenicity bilaterally consistent with medical renal disease. No hydronephrosis in either kidney. IMPRESSION:   Primary Problem  Compression fracture of lumbar vertebra [facility])    Active Hospital Problems    Diagnosis Date Noted    Compression fracture of lumbar vertebra ([facility] [address] [S32.000A] 05/27/2021    Essential hypertension [I10] 05/27/2021    Other hyperlipidemia [E78.49] 05/27/2021    Other specified hypothyroidism [E03.8] 05/27/2021    Acute renal failure (ARF) ([address] [N17.9] 05/25/2021       1. Pathological fracture of L2 ? Differentials could be benign fracture, primary bone tumor or metastatic cancer with unknown primary at this point. Will need more work-up. 2. AMERICO  3. 6 mm retropulsion of spinal canal with moderate canal narrowing    RECOMMENDATIONS:    1. We will follow up with CT abdomen chest and MRI cervical and lumbar spine results. 2. Follow-up kappa/lambda light chain, SPEP, immunofixation studies. 3. We will continue to follow. Kemar Dyson MD  PGY-2 Internal Medicine Resident  [address] [place] [address]  5/27/2021 1:01 PM    Attending Physician Statement  The patient was seen and examined during rounds, I have discussed the care of [facility], including pertinent history and exam findings with the resident. I have reviewed the key elements of all parts of the encounter with the resident. I agree with the assessment, and status of the problem list as documented.    Additional assessment/ plan  Discussed with NS  Concerned for path compression fracture  Await NS evaluation, imaging review and possibly surgical intervention  I ask if surgery is done, that a tissue sample is obtained to establish diagnosis   We will follow with you   ADDENDUM  SPEP is pending but   Results for Luke Russell (MRN [id number]) as of 5/27/2021 19:05   Ref. Range 5/27/2021 14:12   IgA Latest Ref Range: 70 - 400 mg/dL 2250 (H)   Total IgG Latest Ref Range: 700 - 1600 mg/dL <300 (L)   IgM Latest Ref Range: 40 - 230 mg/dL <25 (L)   Results for Luke Russell (MRN [id number]) as of 5/27/2021 19:05   Ref.  Range 5/27/2021 14:12   [place] Free Light Chains QNT Latest Ref Range: 0.37 - 1.94 mg/dL 0.97   Lambda Free Light Chains QNT Latest Ref Range: 0.57 - 2.63 mg/dL 136.34 (H)   Free Kappa/Lambda Ratio Latest Ref Range: 0.26 - 1.65  0.01 (L)   The picture is highly concerning for myeloma  Will discuss with Abilio Gaming MD  Hematology Oncology  [phone]  Electronically signed by Susana Pineda MD on 5/27/2021 at 7:06 PM

## 2021-05-27 NOTE — CONSULTS
Comprehensive Nutrition Assessment    Type and Reason for Visit:  Initial, Consult (BMI 18.3 kg/m2)    Nutrition Recommendations/Plan:   1. Liberalize Diet to General Diet - poor PO  2. High Calorie ONS + Frozen ONS TID  3. Will continue to monitor nutritional status/ plan of care    Nutrition Assessment:  Pt admitted for compression fracture of vetebra. PMHx ARF, HTN, HLD, Hypothyroidism. Pt reports wt loss d/t pain and inability to stand for long periods to cook meals. Pt has had decreased appetite for last several weeks and noticed wt loss. Pt believes she will be able to gain weight once pain improves. Pt reports UBW of 112 lbs - reports weighing this in 2019. Per chart review, pt has experienced 13% wt loss in last 7 months (10/21/2020: 107 lbs; 5/12/2021: 93 lbs). Pt meets critera for severe malnutrition. Writer discussed ways to increase calories/protein and overcome barriers preventing adequate intake with pt and family members. Will provide High Calorie ONS and Frozen ONS while inpatient. Malnutrition Assessment:  Malnutrition Status:  Severe malnutrition    Context:  Chronic Illness     Findings of the 6 clinical characteristics of malnutrition:  Energy Intake:  7 - 75% or less estimated energy requirements for 1 month or longer  Weight Loss:   (13% wt loss in 7 months)     Body Fat Loss:  7 - Severe body fat loss Orbital   Muscle Mass Loss:  7 - Severe muscle mass loss Clavicles (pectoralis & deltoids), Temples (temporalis), Hand (interosseous)  Fluid Accumulation:  No significant fluid accumulation     Strength:  Not Performed    Estimated Daily Nutrient Needs:  Energy (kcal):  1223-7802 kcals/day; Weight Used for Energy Requirements:  Current     Protein (g):  40-50 g/day protein; Weight Used for Protein Requirements:  Current (1.0-1.2 g/kg)        Fluid (ml/day):  1.0-1.3 L (or per MD);  Method Used for Fluid Requirements:  ml/Kg      Nutrition Related Findings:  Labs: GFR 33 mL/min, Glu 154 mg/dL. Meds: Synthroid. Current Nutrition Therapies:    DIET CARDIAC; Diet NPO, After Midnight    Anthropometric Measures:  · Height: 5' (152.4 cm)  · Current Body Weight: 93 lb 11.1 oz (42.5 kg)   · Usual Body Weight: 107 lb (48.5 kg)     · Ideal Body Weight: 100 lbs; % Ideal Body Weight   93%  · BMI: 18.3  · BMI Categories: Underweight (BMI less than 22) age over 72       Nutrition Diagnosis:   · Severe malnutrition related to pain (limited ADLs 2/2 back pain, decrease appetite) as evidenced by weight loss greater than or equal to 10% in 6 months, BMI, severe muscle loss, severe loss of subcutaneous fat, lab values      Nutrition Interventions:   Food and/or Nutrient Delivery:  Modify Current Diet, Start Oral Nutrition Supplement  Nutrition Education/Counseling:  No recommendation at this time   Coordination of Nutrition Care:  Continue to monitor while inpatient    Goals:  Meet >50% of estimated nutrient needs       Nutrition Monitoring and Evaluation:   Behavioral-Environmental Outcomes:  None Identified   Food/Nutrient Intake Outcomes:  Food and Nutrient Intake, Supplement Intake  Physical Signs/Symptoms Outcomes:  Biochemical Data, Nutrition Focused Physical Findings, Skin, Weight     Discharge Planning:     Too soon to determine     Electronically signed by Shona Arshad MS, RD, LD on 5/27/21 at 1:37 PM EDT    Contact: [address] Joaquin Acosta

## 2021-05-27 NOTE — PLAN OF CARE
Problem: Skin Integrity:  Goal: Will show no infection signs and symptoms  Description: Will show no infection signs and symptoms  Outcome: Ongoing  Goal: Absence of new skin breakdown  Description: Absence of new skin breakdown  Outcome: Ongoing     Problem: Falls - Risk of:  Goal: Will remain free from falls  Description: Will remain free from falls  Outcome: Ongoing  Goal: Absence of physical injury  Description: Absence of physical injury  Outcome: Ongoing     Problem: Pain:  Goal: Pain level will decrease  Description: Pain level will decrease  Outcome: Ongoing  Goal: Control of acute pain  Description: Control of acute pain  Outcome: Ongoing  Goal: Control of chronic pain  Description: Control of chronic pain  Outcome: Ongoing     Problem: Nutrition  Goal: Optimal nutrition therapy  5/27/2021 1745 by Mili Lewis RN  Outcome: Ongoing  5/27/2021 1341 by Sharmin Aguirre MS, RD, LD  Outcome: Ongoing  Note: Nutrition Problem #1: Severe malnutrition  Intervention: Food and/or Nutrient Delivery: Modify Current Diet, Start Oral Nutrition Supplement  Nutritional Goals: Meet >50% of estimated nutrient needs

## 2021-05-27 NOTE — CARE COORDINATION
Case Management Initial Discharge Plan  Mariano Osborn,             Met with:patient to discuss discharge plans. Information verified: address, contacts, phone number, , insurance Yes    Emergency Contact/Next of Kin name & number: Dulce Butt, spouse at [phone] or daughter at [phone]    PCP: No primary care provider on file. Date of last visit: [place], [place] at [phone]    Insurance Provider: Edilia Sewell    Discharge Planning    Living Arrangements:  Spouse/Significant Other   Support Systems:  Spouse/Significant Other, Children    Home has 1 stories  4 stairs to climb to get into front door,   Location of bedroom/bathroom in home main    Patient able to perform ADL's:Independent    Current Services (outpatient & in home) none  DME equipment: none  DME provider: n/a    Receiving oral anticoagulation therapy? No    If indicated:   Physician managing anticoagulation treatment: n/a  Where does patient obtain lab work for ATC treatment? n/a      Potential Assistance Needed:  Home Care    Patient agreeable to home care: Yes  Freedom of choice provided:  Yes    Prior SNF/Rehab Placement and Facility: none  Agreeable to SNF/Rehab: No  [place] of choice provided: no     Evaluation: no    Expected Discharge date:       Patient expects to be discharged to:  home  Follow Up Appointment: Best Day/ Time: Wednesday PM    Transportation provider: Malissa Mcmahon her sister  Transportation arrangements needed for discharge: No    Readmission Risk              Risk of Unplanned Readmission:  6             Does patient have a readmission risk score greater than 14?: No  If yes, follow-up appointment must be made within 7 days of discharge. Goals of Care:       Discharge Plan: Home with spouse, monitor for [facility]. needs. Faxed registration PCP info.            Electronically signed by Wesley Whelan RN on 21 at 10:14 AM EDT

## 2021-05-28 ENCOUNTER — APPOINTMENT (OUTPATIENT)
Dept: GENERAL RADIOLOGY | Age: 76
DRG: 456 | End: 2021-05-28
Attending: INTERNAL MEDICINE
Payer: MEDICARE

## 2021-05-28 PROBLEM — E44.0 MODERATE PROTEIN-CALORIE MALNUTRITION (HCC): Status: ACTIVE | Noted: 2021-05-28

## 2021-05-28 LAB
ABO/RH: NORMAL
ALBUMIN (CALCULATED): 3.3 G/DL (ref 3.2–5.2)
ALBUMIN PERCENT: 49 % (ref 45–65)
ALBUMIN SERPL-MCNC: 3 G/DL (ref 3.5–5.2)
ALBUMIN/GLOBULIN RATIO: 0.8 (ref 1–2.5)
ALP BLD-CCNC: 47 U/L (ref 35–104)
ALPHA 1 PERCENT: 3 % (ref 3–6)
ALPHA 2 PERCENT: 13 % (ref 6–13)
ALPHA-1-GLOBULIN: 0.2 G/DL (ref 0.1–0.4)
ALPHA-2-GLOBULIN: 0.9 G/DL (ref 0.5–0.9)
ALT SERPL-CCNC: 9 U/L (ref 5–33)
ANION GAP SERPL CALCULATED.3IONS-SCNC: 10 MMOL/L (ref 9–17)
ANTIBODY SCREEN: NEGATIVE
ARM BAND NUMBER: NORMAL
AST SERPL-CCNC: 15 U/L
BETA GLOBULIN: 2.1 G/DL (ref 0.5–1.1)
BETA PERCENT: 31 % (ref 11–19)
BETA-2 MICROGLOBULIN: 6.9 MG/L (ref 0.6–2.4)
BILIRUB SERPL-MCNC: 0.2 MG/DL (ref 0.3–1.2)
BLOOD BANK SPECIMEN: NORMAL
BUN BLDV-MCNC: 25 MG/DL (ref 8–23)
BUN/CREAT BLD: ABNORMAL (ref 9–20)
CALCIUM SERPL-MCNC: 8.5 MG/DL (ref 8.6–10.4)
CHLORIDE BLD-SCNC: 107 MMOL/L (ref 98–107)
CO2: 23 MMOL/L (ref 20–31)
CREAT SERPL-MCNC: 1.61 MG/DL (ref 0.5–0.9)
EXPIRATION DATE: NORMAL
GAMMA GLOBULIN %: 4 % (ref 9–20)
GAMMA GLOBULIN: 0.3 G/DL (ref 0.5–1.5)
GFR AFRICAN AMERICAN: 38 ML/MIN
GFR NON-AFRICAN AMERICAN: 31 ML/MIN
GFR SERPL CREATININE-BSD FRML MDRD: ABNORMAL ML/MIN/{1.73_M2}
GFR SERPL CREATININE-BSD FRML MDRD: ABNORMAL ML/MIN/{1.73_M2}
GLUCOSE BLD-MCNC: 82 MG/DL (ref 65–105)
GLUCOSE BLD-MCNC: 83 MG/DL (ref 70–99)
GLUCOSE BLD-MCNC: 96 MG/DL (ref 65–105)
HCT VFR BLD CALC: 29.5 % (ref 36.3–47.1)
HEMOGLOBIN: 8.7 G/DL (ref 11.9–15.1)
MCH RBC QN AUTO: 28.2 PG (ref 25.2–33.5)
MCHC RBC AUTO-ENTMCNC: 29.5 G/DL (ref 28.4–34.8)
MCV RBC AUTO: 95.8 FL (ref 82.6–102.9)
NRBC AUTOMATED: 0 PER 100 WBC
PATHOLOGIST: ABNORMAL
PATHOLOGIST: NORMAL
PDW BLD-RTO: 13.3 % (ref 11.8–14.4)
PLATELET # BLD: 234 K/UL (ref 138–453)
PMV BLD AUTO: 9.4 FL (ref 8.1–13.5)
POTASSIUM SERPL-SCNC: 3.9 MMOL/L (ref 3.7–5.3)
PROTEIN ELECTROPHORESIS, SERUM: ABNORMAL
RBC # BLD: 3.08 M/UL (ref 3.95–5.11)
SERUM IFX INTERP: NORMAL
SODIUM BLD-SCNC: 140 MMOL/L (ref 135–144)
TOTAL PROT. SUM,%: 100 % (ref 98–102)
TOTAL PROT. SUM: 6.8 G/DL (ref 6.3–8.2)
TOTAL PROTEIN: 6.8 G/DL (ref 6.4–8.3)
TOTAL PROTEIN: 6.8 G/DL (ref 6.4–8.3)
WBC # BLD: 7.6 K/UL (ref 3.5–11.3)

## 2021-05-28 PROCEDURE — 82947 ASSAY GLUCOSE BLOOD QUANT: CPT

## 2021-05-28 PROCEDURE — 85027 COMPLETE CBC AUTOMATED: CPT

## 2021-05-28 PROCEDURE — 6370000000 HC RX 637 (ALT 250 FOR IP): Performed by: NURSE PRACTITIONER

## 2021-05-28 PROCEDURE — 1200000000 HC SEMI PRIVATE

## 2021-05-28 PROCEDURE — 80053 COMPREHEN METABOLIC PANEL: CPT

## 2021-05-28 PROCEDURE — APPSS30 APP SPLIT SHARED TIME 16-30 MINUTES: Performed by: PHYSICIAN ASSISTANT

## 2021-05-28 PROCEDURE — 77075 RADEX OSSEOUS SURVEY COMPL: CPT

## 2021-05-28 PROCEDURE — 36415 COLL VENOUS BLD VENIPUNCTURE: CPT

## 2021-05-28 PROCEDURE — 86850 RBC ANTIBODY SCREEN: CPT

## 2021-05-28 PROCEDURE — 82232 ASSAY OF BETA-2 PROTEIN: CPT

## 2021-05-28 PROCEDURE — 86900 BLOOD TYPING SEROLOGIC ABO: CPT

## 2021-05-28 PROCEDURE — 86901 BLOOD TYPING SEROLOGIC RH(D): CPT

## 2021-05-28 PROCEDURE — 99233 SBSQ HOSP IP/OBS HIGH 50: CPT | Performed by: INTERNAL MEDICINE

## 2021-05-28 PROCEDURE — 99232 SBSQ HOSP IP/OBS MODERATE 35: CPT | Performed by: NEUROLOGICAL SURGERY

## 2021-05-28 PROCEDURE — 2580000003 HC RX 258: Performed by: INTERNAL MEDICINE

## 2021-05-28 RX ORDER — SENNA PLUS 8.6 MG/1
1 TABLET ORAL NIGHTLY
Status: DISCONTINUED | OUTPATIENT
Start: 2021-05-28 | End: 2021-05-31

## 2021-05-28 RX ORDER — HYDROCODONE BITARTRATE AND ACETAMINOPHEN 5; 325 MG/1; MG/1
1 TABLET ORAL EVERY 4 HOURS PRN
Status: DISCONTINUED | OUTPATIENT
Start: 2021-05-28 | End: 2021-05-31

## 2021-05-28 RX ORDER — HYDROCODONE BITARTRATE AND ACETAMINOPHEN 5; 325 MG/1; MG/1
2 TABLET ORAL EVERY 4 HOURS PRN
Status: DISCONTINUED | OUTPATIENT
Start: 2021-05-28 | End: 2021-05-31

## 2021-05-28 RX ADMIN — HYDROCODONE BITARTRATE AND ACETAMINOPHEN 2 TABLET: 5; 325 TABLET ORAL at 23:27

## 2021-05-28 RX ADMIN — LEVOTHYROXINE SODIUM 50 MCG: 50 TABLET ORAL at 06:20

## 2021-05-28 RX ADMIN — SENNOSIDES 8.6 MG: 8.6 TABLET, FILM COATED ORAL at 21:45

## 2021-05-28 RX ADMIN — ATORVASTATIN CALCIUM 20 MG: 20 TABLET, FILM COATED ORAL at 20:24

## 2021-05-28 RX ADMIN — SODIUM CHLORIDE, PRESERVATIVE FREE 10 ML: 5 INJECTION INTRAVENOUS at 20:24

## 2021-05-28 RX ADMIN — LATANOPROST 1 DROP: 50 SOLUTION OPHTHALMIC at 20:26

## 2021-05-28 RX ADMIN — HYDROCODONE BITARTRATE AND ACETAMINOPHEN 1 TABLET: 5; 325 TABLET ORAL at 14:06

## 2021-05-28 RX ADMIN — HYDROCODONE BITARTRATE AND ACETAMINOPHEN 1 TABLET: 5; 325 TABLET ORAL at 05:05

## 2021-05-28 ASSESSMENT — PAIN SCALES - GENERAL
PAINLEVEL_OUTOF10: 6
PAINLEVEL_OUTOF10: 7
PAINLEVEL_OUTOF10: 5
PAINLEVEL_OUTOF10: 5

## 2021-05-28 NOTE — PLAN OF CARE
Problem: Skin Integrity:  Goal: Will show no infection signs and symptoms  Description: Will show no infection signs and symptoms  5/28/2021 1722 by Eduardo Dawson RN  Outcome: Ongoing  5/28/2021 0425 by Alin Knight RN  Outcome: Ongoing  Goal: Absence of new skin breakdown  Description: Absence of new skin breakdown  5/28/2021 1722 by Eduardo Dawson RN  Outcome: Ongoing  5/28/2021 0425 by Alin Knight RN  Outcome: Ongoing     Problem: Falls - Risk of:  Goal: Will remain free from falls  Description: Will remain free from falls  5/28/2021 1722 by Eduardo Dawson RN  Outcome: Ongoing  5/28/2021 0425 by Alin Knight RN  Outcome: Ongoing  Goal: Absence of physical injury  Description: Absence of physical injury  5/28/2021 1722 by Eduardo Dawson RN  Outcome: Ongoing  5/28/2021 0425 by Alin Knight RN  Outcome: Ongoing     Problem: Pain:  Goal: Pain level will decrease  Description: Pain level will decrease  5/28/2021 1722 by Eduardo Dawson RN  Outcome: Ongoing  5/28/2021 0425 by Alin Knight RN  Outcome: Ongoing  Goal: Control of acute pain  Description: Control of acute pain  5/28/2021 1722 by Eduardo Dawson RN  Outcome: Ongoing  5/28/2021 0425 by Alin Knight RN  Outcome: Ongoing  Goal: Control of chronic pain  Description: Control of chronic pain  5/28/2021 1722 by Eduardo Dawson RN  Outcome: Ongoing  5/28/2021 0425 by Alin Knight RN  Outcome: Ongoing     Problem: Nutrition  Goal: Optimal nutrition therapy  5/28/2021 1722 by Eduardo Dawson RN  Outcome: Ongoing  5/28/2021 0425 by Alin Knight RN  Outcome: Ongoing

## 2021-05-28 NOTE — PLAN OF CARE
Problem: Skin Integrity:  Goal: Will show no infection signs and symptoms  Description: Will show no infection signs and symptoms  5/28/2021 0425 by Alyssa Huerta RN  Outcome: Ongoing     Problem: Skin Integrity:  Goal: Absence of new skin breakdown  Description: Absence of new skin breakdown  5/28/2021 0425 by Alyssa Huerta RN  Outcome: Ongoing     Problem: Falls - Risk of:  Goal: Will remain free from falls  Description: Will remain free from falls  5/28/2021 0425 by Alyssa Huerta RN  Outcome: Ongoing     Problem: Falls - Risk of:  Goal: Absence of physical injury  Description: Absence of physical injury  5/28/2021 0425 by Alyssa Huerta RN  Outcome: Ongoing     Problem: Pain:  Goal: Pain level will decrease  Description: Pain level will decrease  5/28/2021 0425 by Alyssa Huerta RN  Outcome: Ongoing     Problem: Pain:  Goal: Control of acute pain  Description: Control of acute pain  5/28/2021 0425 by Alyssa Huerta RN  Outcome: Ongoing     Problem: Pain:  Goal: Control of chronic pain  Description: Control of chronic pain  5/28/2021 0425 by Alyssa Huerta RN  Outcome: Ongoing     Problem: Nutrition  Goal: Optimal nutrition therapy  5/28/2021 0425 by Alyssa Huerta RN  Outcome: Ongoing

## 2021-05-28 NOTE — PROGRESS NOTES
Neurosurgery MARIBELL/Resident    Daily Progress Note   No chief complaint on file. 5/28/2021  9:38 AM    Chart reviewed. No acute events overnight. No new complaints. Vitals:    05/27/21 1110 05/27/21 2047 05/28/21 0600 05/28/21 0750   BP: (!) 140/85 (!) 152/78  (!) 147/82   Pulse: 76 83  67   Resp: 17 16  16   Temp: 97.8 °F (36.6 °C) 97.7 °F (36.5 °C)  97.9 °F (36.6 °C)   TempSrc: Oral Oral  Oral   SpO2: 96% 96%  94%   Weight:   91 lb 4.3 oz (41.4 kg)    Height:           PE:   AOx3   Motor   L deltoid 5/5; R deltoid 5/5  L biceps 5/5; R biceps 5/5  L triceps 5/5; R triceps 5/5  L wrist extension 5/5; R wrist extension 5/5  L intrinsics 5/5; R intrinsics 5/5      L iliopsoas 5/5 , R iliopsoas 5/5  L quadriceps 5/5; R quadriceps 5/5  L Dorsiflexion 5/5; R dorsiflexion 5/5  L Plantarflexion 5/5; R plantarflexion 5/5  L EHL 5/5; R EHL 5/5    Sensation diminished to light touch anterior aspect BLE above knee     Lab Results   Component Value Date    WBC 7.6 05/28/2021    HGB 8.7 (L) 05/28/2021    HCT 29.5 (L) 05/28/2021     05/28/2021    ALT 9 05/28/2021    AST 15 05/28/2021     05/28/2021    K 3.9 05/28/2021     05/28/2021    CREATININE 1.61 (H) 05/28/2021    BUN 25 (H) 05/28/2021    CO2 23 05/28/2021    TSH 7.01 (H) 05/27/2021    INR 1.2 05/27/2021       Radiology   MRI CERVICAL SPINE W WO CONTRAST    Result Date: 5/27/2021  EXAMINATION: MRI OF THE CERVICAL SPINE WITHOUT AND WITH CONTRAST  5/27/2021 3:50 pm: TECHNIQUE: Multiplanar multisequence MRI of the cervical spine was performed without and with the administration of intravenous contrast. COMPARISON: None. HISTORY: ORDERING SYSTEM PROVIDED HISTORY: metastatic workup TECHNOLOGIST PROVIDED HISTORY: metastatic workup Reason for Exam: metastatic workup Acuity: Acute Type of Exam: Initial FINDINGS: BONES/ALIGNMENT: There is normal alignment of the spine. The vertebral body heights are maintained.   C2 and C3 vertebral body demonstrate fat marrow signal and remainder of the cervical vertebral bodies have diffuse T1 and T2 hypointensity. This appearance may be related to anemia or other diffuse marrow abnormalities. Anterior aspect of C4 has enhancing lesion. Finding may be related to atypical hemangioma or marrow infiltration. There is inhomogeneous fat suppression of the cervical spine causing artifactual increased signal on postcontrast images within the lower cervical spine. SPINAL CORD: No abnormal cord signal is seen. SOFT TISSUES: No abnormal enhancement of the cervical spine. No paraspinal mass identified. C2-C3: There is no significant disc protrusion, spinal canal stenosis or neural foraminal narrowing. C3-C4: Grade 1 retrolisthesis, posterior uncovertebral hypertrophy and 3 mm disc bulging efface the anterior thecal sac. Mild bilateral facet arthropathy. Findings resulting in moderate left neural foraminal narrowing. C4-C5: Grade 1 anterolisthesis with disc bulging. Mild bilateral facet arthropathy. Mild right neural foraminal narrowing. C5-C6: Grade 1 retrolisthesis, posterior uncovertebral hypertrophy and disc bulging. 3 mm left foraminal disc protrusion/osteophytic ridge impinges on the exiting left C6 nerve root. Moderate bilateral facet arthropathy and ligamentum flavum thickening. Findings result in moderate canal stenosis and severe bilateral neural foraminal narrowing. C6-C7: Grade 1 retrolisthesis and 2 mm disc bulging. Moderate left facet arthropathy. Findings resulting in moderate left neural foraminal narrowing. C7-T1: Subtle disc bulging. Mild bilateral facet arthropathy. Otherwise unremarkable. No evidence of metastatic disease inside the spinal canal. C2 and C3 vertebral body demonstrate fat marrow signal and remainder of the cervical vertebral bodies have diffuse T1 and T2 hypointensity. C2 and C3 may have been exposed to prior radiation.   The appearance of the remainder of vertebral bodies may be related to anemia or other diffuse marrow abnormalities. Anterior aspect of C4 has enhancing lesion. Finding may be related to atypical hemangioma or marrow infiltration. At C5-C6, moderate canal stenosis and severe bilateral neural foraminal narrowing. Left foraminal disc protrusion/osteophytic ridging impinges on the exiting left C6 nerve root. Other mild to moderate degenerative changes of cervical spine as described. MRI THORACIC SPINE W WO CONTRAST    Addendum Date: 5/27/2021    ADDENDUM: Critical results [facility] at 19:26. The reported infiltrative mass lesion within the upper lumbar spine may also be related to hematoma . Result Date: 5/27/2021  EXAMINATION: MRI OF THE THORACIC SPINE WITHOUT AND WITH CONTRAST  5/27/2021 3:50 pm TECHNIQUE: Multiplanar multisequence MRI of the thoracic spine was performed without and with the administration of intravenous contrast. COMPARISON: None HISTORY: ORDERING SYSTEM PROVIDED HISTORY: metastatic workup TECHNOLOGIST PROVIDED HISTORY: metastatic workup Reason for Exam: metastatic workup Acuity: Acute Type of Exam: Initial FINDINGS: BONES/ALIGNMENT: There is normal alignment of the spine. The vertebral body heights are maintained. The bone marrow signal appears unremarkable. There are discrete enhancing lesions involving the spinous processes of the vertebral bodies of T4, T6 and T7 and anterior aspect of vertebral body of T4 concerning for metastatic disease. There is diffuse T1 and T2 hypointensity of the marrow which may be effect of anemia or chemotherapy. T1 hyperintense lesion of T11 vertebral body is likely focal fat. SPINAL CORD: No abnormal cord signal is seen. SOFT TISSUES:  No abnormal enhancement of the thoracic spine. Only partially included on the acquired images there is a diffuse infiltrative mass lesion surrounding the anterior and lateral aspect of L1 vertebral body. . DEGENERATIVE CHANGES: No significant spinal canal stenosis or neural foraminal narrowing of the thoracic spine. Subtle multilevel posterior disc bulging of mid to lower thoracic spine is noted. Discrete enhancing lesions involving the spinous processes of the vertebral bodies of T4, T6 and T7 and anterior aspect of vertebral body of T4 concerning for metastatic disease. Diffuse T1 and T2 hypointensity of the marrow which may be effect of anemia or chemotherapy. Only partially included on the acquired images there is a diffuse infiltrative mass lesion surrounding the anterior and lateral aspect of L1 vertebral body. Clinical correlation and if appropriate contrast enhanced CT of abdomen and pelvis is recommended. The findings were sent to the Radiology Results [id number] at 7:10 pm on 5/27/2021to be communicated to a licensed caregiver. US RENAL COMPLETE    Result Date: 5/27/2021  EXAMINATION: RETROPERITONEAL ULTRASOUND OF THE KIDNEYS AND URINARY BLADDER 5/27/2021 COMPARISON: None HISTORY: ORDERING SYSTEM PROVIDED HISTORY: ARF TECHNOLOGIST PROVIDED HISTORY: US RETROPERITONEAL COMPLETE ARF FINDINGS: Kidneys: The right kidney measures 10.0 x 4.0 x 3.5 cm and the left kidney measures 10.5 x 3.3 x 5.2 cm. Cortical thickness within normal limits bilaterally. There is diffuse increased renal parenchymal echogenicity bilaterally with increased prominence of the relative hypoechoic renal pyramids. A tiny 7 x 5 x 6 mm right lower pole renal cyst is noted. No other focal renal lesion. No hydronephrosis in either kidney. No sonographic evidence for renal calculi. Bladder: Unremarkable appearance of the bladder. No dilated distal ureters. The patient did not have the urge to void. Bilateral ureteral jets were identified. Diffuse increased renal parenchymal echogenicity bilaterally consistent with medical renal disease. No hydronephrosis in either kidney.        A/P  77 y.o. female who presents with pathologic fracture L2

## 2021-05-28 NOTE — PROGRESS NOTES
Today's Date: 5/28/2021  Patient Name: Shayna Tavares  Date of admission: 5/27/2021  2:18 AM  Patient's age: 77 y. o., 1945  Admission Dx: Acute renal failure (ARF) ([address] [N17.9]    Reason for Consult: management recommendations  Requesting Physician: Devon Fairchild DO    CHIEF COMPLAINT: Compression fracture of lumbar vertebra    Subjective:    No acute events. Vitals stable. IgA elevated to 2250, IgM and total IgG low. Free kappa lambda ratio 0.01. Neurosurgery tentatively planning to do surgery next week. Serum IFX Interp 05/27/2021  2:12  [address]   A ZONE OF RESTRICTION IS PRESENT IN THE BETA GLOBULIN REGION.  CONFIRMED BY IMMUNOFIXATION TO BE MONOCLONAL   Comment:   IgA LAMBDA (1.31 G/DL). FREE MONOCLONAL LIGHT CHAINS ARE PRESENT, IDENTIFIED AS      MRI thoracic spine:  Discrete enhancing lesions involving the spinous processes of the vertebral   bodies of T4, T6 and T7 and anterior aspect of vertebral body of T4   concerning for metastatic disease.       Diffuse T1 and T2 hypointensity of the marrow which may be effect of anemia   or chemotherapy.       Only partially included on the acquired images there is a diffuse   infiltrative mass lesion surrounding the anterior and lateral aspect of L1   vertebral body.  Clinical correlation and if appropriate contrast enhanced CT   of abdomen and pelvis is recommended. MRI cervical spine  Anterior aspect of C4 has enhancing lesion.  Finding may be related to   atypical hemangioma or marrow infiltration.       At C5-C6, moderate canal stenosis and severe bilateral neural foraminal   narrowing.  Left foraminal disc protrusion/osteophytic ridging impinges on   the exiting left C6 nerve root         HISTORY OF PRESENT ILLNESS:      The patient is a 77 y.o. female with no significant past medical history, she initially presented to the ER at [facility] facility for worsening lower back pain, was found to have UTI with right ureteral stone and was treated with Bactrim for 7 days. Patient's back pain continued to worsen so follow-up MRI was done which showed burst L2 fracture with a 6 mm retropulsion of spinal canal with moderate canal narrowing. Patient was then transferred to [facility] for neurosurgery evaluation and admission. Currently, patient is awake alert sitting comfortably no acute distress. Vitals and labs reviewed patient admits to decreased appetite and weight loss since January. Neurosurgery has been consulted, will follow up on repeat CT chest abdomen and pelvis and MRI cervical and thoracic spine. Vitals stable  Labs reviewed. Creatinine 1.53, calcium 9.1, Hb 9.5    Past Medical History:   has a past medical history of Acute renal failure (ARF) ([facility]), Compression fracture of lumbar vertebra ([address] Essential hypertension, Other hyperlipidemia, and Other specified hypothyroidism. Past Surgical History:   has a past surgical history that includes Finger trigger release (Left). Medications:    Prior to Admission medications    Medication Sig Start Date End Date Taking?  Authorizing Provider   naproxen (NAPROSYN) 500 MG tablet Take 500 mg by mouth 2 times daily (with meals)   Yes Historical Provider, MD   latanoprost (XALATAN) 0.005 % ophthalmic solution Place 1 drop into both eyes nightly   Yes Historical Provider, MD   levothyroxine (SYNTHROID) 50 MCG tablet Take 50 mcg by mouth Daily   Yes Historical Provider, MD   lisinopril (PRINIVIL;ZESTRIL) 20 MG tablet Take 20 mg by mouth daily   Yes Historical Provider, MD   simvastatin (ZOCOR) 20 MG tablet Take 20 mg by mouth nightly   Yes Historical Provider, MD   amLODIPine (NORVASC) 5 MG tablet Take 5 mg by mouth daily   Yes Historical Provider, MD   aspirin 81 MG chewable tablet Take 81 mg by mouth daily   Yes Historical Provider, MD     Current Facility-Administered Medications   Medication Dose Route Frequency Provider Last Rate Last Admin    HYDROcodone-acetaminophen (NORCO) 5-325 MG per tablet 1 tablet  1 tablet Oral Q4H PRN Jose Ebenise, APRN - CNP   1 tablet at 05/28/21 0505    Or    HYDROcodone-acetaminophen (NORCO) 5-325 MG per tablet 2 tablet  2 tablet Oral Q4H PRN Jose Treadwellise, APRN - CNP        0.9 % sodium chloride infusion   Intravenous Continuous Dena Gonzalez DO 75 mL/hr at 05/27/21 0353 New Bag at 05/27/21 0353    sodium chloride flush 0.9 % injection 5-40 mL  5-40 mL Intravenous 2 times per day Renaldo Romano MD        sodium chloride flush 0.9 % injection 5-40 mL  5-40 mL Intravenous PRN Renaldo Romano MD        0.9 % sodium chloride infusion  25 mL Intravenous PRN Renaldo Romano MD        nicotine (NICODERM CQ) 21 MG/24HR 1 patch  1 patch Transdermal Daily PRN Renaldo Romano MD        acetaminophen (TYLENOL) tablet 650 mg  650 mg Oral Q6H PRN Renaldo Romano MD   650 mg at 05/27/21 2147    Or    acetaminophen (TYLENOL) suppository 650 mg  650 mg Rectal Q6H PRN Renaldo Romano MD        heparin (porcine) injection 5,000 Units  5,000 Units Subcutaneous 3 times per day Renaldo Romano MD        latanoprost (XALATAN) 0.005 % ophthalmic solution 1 drop  1 drop Both Eyes Nightly BRIA Ramos - NP   1 drop at 05/27/21 2217    levothyroxine (SYNTHROID) tablet 50 mcg  50 mcg Oral Daily Win Stout APRN - NP   50 mcg at 05/28/21 9716    atorvastatin (LIPITOR) tablet 20 mg  20 mg Oral Daily Win Stout APRN - NP   20 mg at 05/27/21 2216    sodium chloride flush 0.9 % injection 10 mL  10 mL Intravenous PRN Dena Gonzalez DO           Allergies:  Patient has no known allergies. Social History:   reports that she has never smoked. She has never used smokeless tobacco. She reports previous alcohol use. She reports that she does not use drugs. Family History: family history includes No Known Problems in her father and mother. REVIEW OF SYSTEMS:      Constitutional: No fever or chills.  No night sweats, no weight loss   Eyes: No eye discharge, double vision, or eye pain   HEENT: negative for sore mouth, sore throat, hoarseness and voice change   Respiratory: negative for cough , sputum, dyspnea, wheezing, hemoptysis, chest pain   Cardiovascular: negative for chest pain, dyspnea, palpitations, orthopnea, PND   Gastrointestinal: negative for nausea, vomiting, diarrhea, constipation, abdominal pain, Dysphagia, hematemesis and hematochezia   Genitourinary: negative for frequency, dysuria, nocturia, urinary incontinence, and hematuria   Integument: negative for rash, skin lesions, bruises.    Hematologic/Lymphatic: negative for easy bruising, bleeding, lymphadenopathy, or petechiae   Endocrine: negative for heat or cold intolerance,weight changes, change in bowel habits and hair loss   Musculoskeletal: negative for myalgias, arthralgias, pain, joint swelling,and bone pain   Neurological: negative for headaches, dizziness, seizures, weakness, numbness    PHYSICAL EXAM:        BP (!) 147/82   Pulse 67   Temp 97.9 °F (36.6 °C) (Oral)   Resp 16   Ht 5' (1.524 m)   Wt 91 lb 4.3 oz (41.4 kg)   SpO2 94%   BMI 17.83 kg/m²    Temp (24hrs), Av.8 °F (36.6 °C), Min:97.7 °F (36.5 °C), Max:97.9 °F (36.6 °C)      General appearance - well appearing, no in pain or distress   Mental status - alert and cooperative   Eyes - pupils equal and reactive, extraocular eye movements intact   Ears - bilateral TM's and external ear canals normal   Mouth - mucous membranes moist, pharynx normal without lesions   Neck - supple, no significant adenopathy   Lymphatics - no palpable lymphadenopathy, no hepatosplenomegaly   Chest - clear to auscultation, no wheezes, rales or rhonchi, symmetric air entry   Heart - normal rate, regular rhythm, normal S1, S2, no murmurs  Abdomen - soft, nontender, nondistended, no masses or organomegaly   Neurological - alert, oriented, normal speech, no focal findings or movement disorder noted   Musculoskeletal - no joint tenderness, deformity or swelling   Extremities - peripheral pulses normal, no pedal edema, no clubbing or cyanosis   Skin - normal coloration and turgor, no rashes, no suspicious skin lesions noted ,      DATA:      Labs:     Results for orders placed or performed during the hospital encounter of 05/27/21   Urinalysis with microscopic   Result Value Ref Range    Color, UA ORANGE (A) YELLOW    Turbidity UA CLEAR CLEAR    Glucose, Ur NEGATIVE NEGATIVE    Bilirubin Urine NEGATIVE NEGATIVE    Ketones, Urine NEGATIVE NEGATIVE    Specific Gravity, UA 1.014 1.005 - 1.030    Urine Hgb LARGE (A) NEGATIVE    pH, UA 7.0 5.0 - 8.0    Protein, UA 2+ (A) NEGATIVE    Urobilinogen, Urine Normal Normal    Nitrite, Urine NEGATIVE NEGATIVE    Leukocyte Esterase, Urine TRACE (A) NEGATIVE    -          WBC, UA 10 TO 20 0 - 5 /HPF    RBC, UA TOO NUMEROUS TO COUNT 0 - 4 /HPF    Casts UA  0 - 8 /LPF     2 TO 5 HYALINE Reference range defined for non-centrifuged specimen. Crystals, UA NOT REPORTED None /HPF    Epithelial Cells UA 0 TO 2 0 - 5 /HPF    Renal Epithelial, UA NOT REPORTED 0 /HPF    Bacteria, UA NOT REPORTED None    Mucus, UA NOT REPORTED None    Trichomonas, UA NOT REPORTED None    Amorphous, UA NOT REPORTED None    Other Observations UA NOT REPORTED NOT REQ.     Yeast, UA NOT REPORTED None   Sodium, urine, random   Result Value Ref Range    Sodium,Ur 110 mmol/L   Protein, urine, random   Result Value Ref Range    Total Protein, Urine 354 mg/dL   Osmolality, urine   Result Value Ref Range    Osmolality, Ur 421 80 - 1300 mOsm/kg   Creatinine, urine, random   Result Value Ref Range    Creatinine, Ur 54.7 28.0 - 217.0 mg/dL   CBC   Result Value Ref Range    WBC 7.5 3.5 - 11.3 k/uL    RBC 3.29 (L) 3.95 - 5.11 m/uL    Hemoglobin 9.5 (L) 11.9 - 15.1 g/dL    Hematocrit 30.1 (L) 36.3 - 47.1 %    MCV 91.5 82.6 - 102.9 fL    MCH 28.9 25.2 - 33.5 pg    MCHC 31.6 28.4 - 34.8 g/dL    RDW 13.6 11.8 - 14.4 %    Platelets 037 717 - 170 k/uL    MPV 9.8 8.1 - 13.5 fL    NRBC Automated 0.0 0.0 per 100 WBC   Comprehensive Metabolic Panel w/ Reflex to MG   Result Value Ref Range    Glucose 154 (H) 70 - 99 mg/dL    BUN 27 (H) 8 - 23 mg/dL    CREATININE 1.53 (H) 0.50 - 0.90 mg/dL    Bun/Cre Ratio NOT REPORTED 9 - 20    Calcium 9.1 8.6 - 10.4 mg/dL    Sodium 142 135 - 144 mmol/L    Potassium 4.1 3.7 - 5.3 mmol/L    Chloride 105 98 - 107 mmol/L    CO2 23 20 - 31 mmol/L    Anion Gap 14 9 - 17 mmol/L    Alkaline Phosphatase 52 35 - 104 U/L    ALT 11 5 - 33 U/L    AST 16 <32 U/L    Total Bilirubin 0.24 (L) 0.3 - 1.2 mg/dL    Total Protein 7.2 6.4 - 8.3 g/dL    Albumin 3.2 (L) 3.5 - 5.2 g/dL    Albumin/Globulin Ratio 0.8 (L) 1.0 - 2.5    GFR Non- 33 (L) >60 mL/min    GFR African American 40 (L) >60 mL/min    GFR Comment          GFR Staging NOT REPORTED    Magnesium   Result Value Ref Range    Magnesium 1.8 1.6 - 2.6 mg/dL   Protime-INR   Result Value Ref Range    Protime 12.4 (H) 9.1 - 12.3 sec    INR 1.2    Vitamin B12 & Folate   Result Value Ref Range    Vitamin B-12 857 232 - 1245 pg/mL    Folate >20.0 >4.8 ng/mL   Iron and TIBC   Result Value Ref Range    Iron 75 37 - 145 ug/dL    TIBC 215 (L) 250 - 450 ug/dL    Iron Saturation 35 20 - 55 %    UIBC 140 112 - 347 ug/dL   Ferritin   Result Value Ref Range    Ferritin 292 (H) 13 - 150 ug/L   Reticulocytes   Result Value Ref Range    Retic % 1.6 0.5 - 1.9 %    Absolute Retic # 0.050 0.030 - 0.080 M/uL    Immature Retic Fract 12.400 2.7 - 18.3 %    Retic Hemoglobin 30.8 28.2 - 35.7 pg   TSH with Reflex   Result Value Ref Range    TSH 7.01 (H) 0.30 - 5.00 mIU/L   Electrophoresis Protein, Serum without Reflex to Immunofixation   Result Value Ref Range    Total Protein 6.8 6.4 - 8.3 g/dL    Albumin (calculated) 3.3 3.2 - 5.2 g/dL    Albumin % 49 45 - 65 %    Alpha-1-Globulin 0.2 0.1 - 0.4 g/dL    Alpha 1 % 3 3 - 6 %    Alpha-2-Globulin 0.9 0.5 - 0.9 g/dL    Alpha 2 % 13 6 - 13 %    Beta Globulin 2.1 (H) 0.5 - 1.1 g/dL    Beta Percent 31 (H) 11 - 19 %    Gamma Globulin 0.3 (L) 0.5 - 1.5 g/dL    Gamma Globulin % 4 (L) 9 - 20 %    Total Prot. Sum 6.8 6.3 - 8.2 g/dL    Total Prot. Sum,% 100 98 - 102 %    Protein Electrophoresis, Serum       A ZONE OF RESTRICTION IS PRESENT IN THE BETA GLOBULIN REGION. CONFIRMED BY IMMUNOFIXATION TO BE MONOCLONAL    Pathologist ELECTRONICALLY SIGNED. Nidia Lopez M.D. IMMUNOFIXATION SERUM PROFILE   Result Value Ref Range    Serum IFX Interp       A ZONE OF RESTRICTION IS PRESENT IN THE BETA GLOBULIN REGION. CONFIRMED BY IMMUNOFIXATION TO BE MONOCLONAL    Pathologist ELECTRONICALLY SIGNED.  Nidia Lopez M.D.    KAPPA/LAMBDA QUANT FREE LIGHT CHAINS SERUM   Result Value Ref Range    Kappa Free Light Chains QNT 0.97 0.37 - 1.94 mg/dL    Lambda Free Light Chains .34 (H) 0.57 - 2.63 mg/dL    Free Kappa/Lambda Ratio 0.01 (L) 0.26 - 1.65   IMMUNOGLOBULINS PANEL   Result Value Ref Range    IgG <300 (L) 700 - 1600 mg/dL    IgA 2250 (H) 70 - 400 mg/dL    IgM <25 (L) 40 - 230 mg/dL   T4, Free   Result Value Ref Range    Thyroxine, Free 1.35 0.93 - 1.70 ng/dL   CBC   Result Value Ref Range    WBC 7.6 3.5 - 11.3 k/uL    RBC 3.08 (L) 3.95 - 5.11 m/uL    Hemoglobin 8.7 (L) 11.9 - 15.1 g/dL    Hematocrit 29.5 (L) 36.3 - 47.1 %    MCV 95.8 82.6 - 102.9 fL    MCH 28.2 25.2 - 33.5 pg    MCHC 29.5 28.4 - 34.8 g/dL    RDW 13.3 11.8 - 14.4 %    Platelets 966 944 - 708 k/uL    MPV 9.4 8.1 - 13.5 fL    NRBC Automated 0.0 0.0 per 100 WBC   Comprehensive Metabolic Panel w/ Reflex to MG   Result Value Ref Range    Glucose 83 70 - 99 mg/dL    BUN 25 (H) 8 - 23 mg/dL    CREATININE 1.61 (H) 0.50 - 0.90 mg/dL    Bun/Cre Ratio NOT REPORTED 9 - 20    Calcium 8.5 (L) 8.6 - 10.4 mg/dL    Sodium 140 135 - 144 mmol/L    Potassium 3.9 3.7 - 5.3 mmol/L    Chloride 107 98 - 107 mmol/L    CO2 23 20 - 31 mmol/L    Anion Gap 10 9 - 17 mmol/L    Alkaline Phosphatase 47 35 - 104 U/L    ALT 9 5 - 33 U/L    AST 15 <32 U/L    Total Bilirubin 0.20 (L) 0.3 - 1.2 mg/dL    Total Protein 6.8 6.4 - 8.3 g/dL    Albumin 3.0 (L) 3.5 - 5.2 g/dL    Albumin/Globulin Ratio 0.8 (L) 1.0 - 2.5    GFR Non- 31 (L) >60 mL/min    GFR  38 (L) >60 mL/min    GFR Comment          GFR Staging NOT REPORTED    POC Glucose Fingerstick   Result Value Ref Range    POC Glucose 82 65 - 105 mg/dL         IMAGING DATA:    US RENAL COMPLETE    Result Date: 5/27/2021  EXAMINATION: RETROPERITONEAL ULTRASOUND OF THE KIDNEYS AND URINARY BLADDER 5/27/2021 COMPARISON: None HISTORY: ORDERING SYSTEM PROVIDED HISTORY: ARF TECHNOLOGIST PROVIDED HISTORY: US RETROPERITONEAL COMPLETE ARF FINDINGS: Kidneys: The right kidney measures 10.0 x 4.0 x 3.5 cm and the left kidney measures 10.5 x 3.3 x 5.2 cm. Cortical thickness within normal limits bilaterally. There is diffuse increased renal parenchymal echogenicity bilaterally with increased prominence of the relative hypoechoic renal pyramids. A tiny 7 x 5 x 6 mm right lower pole renal cyst is noted. No other focal renal lesion. No hydronephrosis in either kidney. No sonographic evidence for renal calculi. Bladder: Unremarkable appearance of the bladder. No dilated distal ureters. The patient did not have the urge to void. Bilateral ureteral jets were identified. Diffuse increased renal parenchymal echogenicity bilaterally consistent with medical renal disease. No hydronephrosis in either kidney.          IMPRESSION:   Primary Problem  Compression fracture of lumbar vertebra [facility])    Active Hospital Problems    Diagnosis Date Noted    Moderate protein-calorie malnutrition ([address] [E44.0] 05/28/2021    Compression fracture of lumbar vertebra ([address] [S32.000A] 05/27/2021    Essential hypertension [I10] 05/27/2021    Other hyperlipidemia [E78.49] 05/27/2021    Subclinical hypothyroidism [E03.9] 05/27/2021    Pathologic fracture of vertebrae [M84.48XA]     Normocytic normochromic anemia [D64.9]     Multiple myeloma not having achieved remission ([facility] [C90.00]     Acute renal failure (ARF) ([facility] [N17.9] 05/25/2021       1. Pathological fracture of L2 ? Differentials could be benign fracture, primary bone tumor or metastatic cancer with unknown primary at this point. Will need more work-up. 2. AMER[place]  3. 6 mm retropulsion of spinal canal with moderate canal narrowing    RECOMMENDATIONS:    1. MRI thoracic spine suggestive of ?metastasis, infiltrative mass lesion surrounding L1. We will follow up with CT abdomen chest   2. 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 3. Tentative plan to do surgery next week, will follow up on tissue sample biopsy results. 4. Patient will need to radiation oncology evaluation after the surgery. She will need another myeloma treatment as outpatient. If needed will be initiated as inpatient. 5. We will continue to follow. Addis Gonzalez MD  PGY-2 Internal Medicine Resident  [address] [place] [address]  5/28/2021 1:58 PM      Attending Physician Statement   I have discussed the care of [facility], including pertinent history and exam findings with the resident. I have reviewed the key elements of all parts of the encounter with the resident. I have seen and examined the patient with the resident. I agree with the assessment and plan and status of the problem list as documented.                                [address] [facility]

## 2021-05-28 NOTE — PROGRESS NOTES
[facility]  Office: Kimber Babin, DO, Pravin Yee DO, Jania Donnelly, DO, Live Campbell, DO, Lisa Mclcellan MD, Dixie Black MD, Darby Tay MD, Aureliano Santana MD, Niki Quintanilla MD, Irving Hayden MD, Ambrose Jennings MD, Sailaja Barlow MD, Ella Diaz DO, Yovani Hazel MD, Iza Santana DO, Lazaro Wagner MD,  Collin Costa DO, Henry Desir MD, Jose Steward MD, Elizabeth Martinez MD, Lukas Engel MD, Leticia Lozano, George Ryan, CNP, Dale Wilson, CNP, Nicol Perez, CNS, Yi Grey, CNP, Amy Blake, CNP, Komal Martinez, CNP, Shahid David, CNP, Kesha Elizabeth, CNP, Fabio Krause PA-C, Zaheer Olivares, [facility], Eliel Sheriff, CNP, Liss Shah, CNP, Bessie Gonzalez, CNP, Bart Camp, CNP, Stefan Shaw, CNP, Melanie Aleman, [facility]    Progress Note    5/28/2021    12:27 PM    Name:   Donta Miranda  MRN:     [id number]     Acct:      [de-identified]   Room:   [address] [place] [address]-[facility] Day:  1  Admit Date:  5/27/2021  2:18 AM    PCP:   No primary care provider on file. Code Status:  Full Code    Subjective:     C/C: Back pain. Interval History Status: not changed. Pt seen and examined. No acute events overnight. Pt is tolerating her diet in no distress. No lower ext numbness or weakness. No fevers or chills. Cr did get worse today despite IV hydration. Vital signs have been stable. Brief History: This is a 73-year-old female who initially presented to the hospital with complaints of increasing back pain. Initial MRI did show burst L2 fracture with 6 mm retropulsion of the spinal canal with moderate canal narrowing along with lucency in the right side. Patient was transferred for neurosurgery evaluation. MRI was concerning for pathologic fracture.   IgA level 2250, kappa lambda ratio 0.01 and of work-up pending    Review of Systems:     Constitutional:  negative for per 24 hour   Intake    Output 500 ml   Net -500 ml       Labs:  Hematology:  Recent Labs     05/27/21  0944 05/28/21  0541   WBC 7.5 7.6   RBC 3.29* 3.08*   HGB 9.5* 8.7*   HCT 30.1* 29.5*   MCV 91.5 95.8   MCH 28.9 28.2   MCHC 31.6 29.5   RDW 13.6 13.3    234   MPV 9.8 9.4   INR 1.2  --      Chemistry:  Recent Labs     05/27/21  0944 05/28/21  0541    140   K 4.1 3.9    107   CO2 23 23   GLUCOSE 154* 83   BUN 27* 25*   CREATININE 1.53* 1.61*   MG 1.8  --    ANIONGAP 14 10   LABGLOM 33* 31*   GFRAA 40* 38*   CALCIUM 9.1 8.5*     Recent Labs     05/27/21  0944 05/27/21  1412 05/28/21  0541 05/28/21  0736   PROT 7.2 6.8 6.8  --    LABALBU 3.2*  --  3.0*  --    TSH 7.01*  --   --   --    AST 16  --  15  --    ALT 11  --  9  --    ALKPHOS 52  --  47  --    BILITOT 0.24*  --  0.20*  --    POCGLU  --   --   --  82     ABG:No results found for: POCPH, PHART, PH, POCPCO2, UWL2NGP, PCO2, POCPO2, PO2ART, PO2, POCHCO3, DMP8QIC, HCO3, NBEA, PBEA, BEART, BE, THGBART, THB, DQA6IHM, WEYP6IBE, P3RUHOTJ, O2SAT, FIO2  No results found for: SPECIAL  No results found for: CULTURE    Radiology:  MRI CERVICAL SPINE W WO CONTRAST    Result Date: 5/27/2021  No evidence of metastatic disease inside the spinal canal. C2 and C3 vertebral body demonstrate fat marrow signal and remainder of the cervical vertebral bodies have diffuse T1 and T2 hypointensity. C2 and C3 may have been exposed to prior radiation. The appearance of the remainder of vertebral bodies may be related to anemia or other diffuse marrow abnormalities. Anterior aspect of C4 has enhancing lesion. Finding may be related to atypical hemangioma or marrow infiltration. At C5-C6, moderate canal stenosis and severe bilateral neural foraminal narrowing. Left foraminal disc protrusion/osteophytic ridging impinges on the exiting left C6 nerve root. Other mild to moderate degenerative changes of cervical spine as described.      MRI THORACIC SPINE W WO CONTRAST Addendum Date: 5/27/2021    ADDENDUM: Critical results were called by Dr. Fredy Steward MD to [employer]. [facility] on 5/27/2021 at 19:26. The reported infiltrative mass lesion within the upper lumbar spine may also be related to hematoma . Result Date: 5/27/2021  Discrete enhancing lesions involving the spinous processes of the vertebral bodies of T4, T6 and T7 and anterior aspect of vertebral body of T4 concerning for metastatic disease. Diffuse T1 and T2 hypointensity of the marrow which may be effect of anemia or chemotherapy. Only partially included on the acquired images there is a diffuse infiltrative mass lesion surrounding the anterior and lateral aspect of L1 vertebral body. Clinical correlation and if appropriate contrast enhanced CT of abdomen and pelvis is recommended. The findings were sent to the Radiology Results [address] at 7:10 pm on 5/27/2021to be communicated to a licensed caregiver. US RENAL COMPLETE    Result Date: 5/27/2021  Diffuse increased renal parenchymal echogenicity bilaterally consistent with medical renal disease. No hydronephrosis in either kidney.        Physical Examination:        General appearance:  alert, cooperative and no distress  Mental Status:  oriented to person, place and time and normal affect  Lungs:  clear to auscultation bilaterally, normal effort  Heart:  regular rate and rhythm, no murmur  Abdomen:  soft, nontender, nondistended, normal bowel sounds, no masses, hepatomegaly, splenomegaly  Extremities:  no edema, redness, tenderness in the calves  Skin:  no gross lesions, rashes, induration    Assessment:        Hospital Problems         Last Modified POA    * (Principal) Compression fracture of lumbar vertebra ([address] 5/27/2021 Yes    Acute renal failure (ARF) ([address] 5/27/2021 Yes    Essential hypertension 5/27/2021 Yes    Other hyperlipidemia 5/27/2021 Yes    Subclinical hypothyroidism 5/27/2021 Yes    Pathologic fracture of vertebrae 5/27/2021 Yes Normocytic normochromic anemia 5/27/2021 Yes    Multiple myeloma not having achieved remission ([facility] 5/27/2021 Yes          Plan:        1. Pathologic fracture of lumbar Vertebra: Neurosurgery consulted, plan for OR early next week. Continue PT/OT. TLSO brace ordered. Hold ASA,  2. Check urine/serum electrophoreses with immunofixation profile. Kappa/Lambda 0.97/136. Immunoglobulin panel: IGA: 2250. Oncology consulted, need tissue diagnosis. 3.  Check skeletal surveys. 4. Protein calorie malnutrition: liberalize diet, High calorie supplement. 5. Scr has been stable. Continue IV hydration. Check urine protein to creatinine ratio to quantify proteinuria. Nephrology consulted. Continue IVF. Concern for myeloma kidney. 6. Monitor Hgb, transfuse prn for symptomatic anemia or Hgb <7  7. Continue Lipitor 20 mg daily. 8. Continue DVT ppx.  9. Continue Synthroid 50 mcg daily. 10. Discussed with Nephrology and Oncology  11. Risk stratification: patient is moderate risk for surgery.        Fernando Hernadez DO  5/28/2021  12:27 PM \

## 2021-05-29 LAB
-: ABNORMAL
ALBUMIN SERPL-MCNC: 3 G/DL (ref 3.5–5.2)
ALBUMIN/GLOBULIN RATIO: 0.8 (ref 1–2.5)
ALP BLD-CCNC: 43 U/L (ref 35–104)
ALT SERPL-CCNC: 9 U/L (ref 5–33)
AMORPHOUS: ABNORMAL
ANION GAP SERPL CALCULATED.3IONS-SCNC: 9 MMOL/L (ref 9–17)
AST SERPL-CCNC: 15 U/L
BACTERIA: ABNORMAL
BILIRUB SERPL-MCNC: 0.15 MG/DL (ref 0.3–1.2)
BILIRUBIN URINE: NEGATIVE
BUN BLDV-MCNC: 25 MG/DL (ref 8–23)
BUN/CREAT BLD: ABNORMAL (ref 9–20)
CALCIUM SERPL-MCNC: 8.6 MG/DL (ref 8.6–10.4)
CASTS UA: ABNORMAL /LPF (ref 0–8)
CHLORIDE BLD-SCNC: 105 MMOL/L (ref 98–107)
CO2: 22 MMOL/L (ref 20–31)
COLOR: YELLOW
CREAT SERPL-MCNC: 1.18 MG/DL (ref 0.5–0.9)
CREATININE URINE: 30.5 MG/DL (ref 28–217)
CREATININE URINE: 33 MG/DL (ref 28–217)
CRYSTALS, UA: ABNORMAL /HPF
EPITHELIAL CELLS UA: ABNORMAL /HPF (ref 0–5)
GFR AFRICAN AMERICAN: 54 ML/MIN
GFR NON-AFRICAN AMERICAN: 45 ML/MIN
GFR SERPL CREATININE-BSD FRML MDRD: ABNORMAL ML/MIN/{1.73_M2}
GFR SERPL CREATININE-BSD FRML MDRD: ABNORMAL ML/MIN/{1.73_M2}
GLUCOSE BLD-MCNC: 82 MG/DL (ref 70–99)
GLUCOSE URINE: NEGATIVE
HCT VFR BLD CALC: 27.5 % (ref 36.3–47.1)
HEMOGLOBIN: 8.1 G/DL (ref 11.9–15.1)
KETONES, URINE: NEGATIVE
LEUKOCYTE ESTERASE, URINE: NEGATIVE
MCH RBC QN AUTO: 28.3 PG (ref 25.2–33.5)
MCHC RBC AUTO-ENTMCNC: 29.5 G/DL (ref 28.4–34.8)
MCV RBC AUTO: 96.2 FL (ref 82.6–102.9)
MUCUS: ABNORMAL
NITRITE, URINE: NEGATIVE
NRBC AUTOMATED: 0 PER 100 WBC
OTHER OBSERVATIONS UA: ABNORMAL
PDW BLD-RTO: 13.4 % (ref 11.8–14.4)
PH UA: 6 (ref 5–8)
PLATELET # BLD: 232 K/UL (ref 138–453)
PMV BLD AUTO: 9.7 FL (ref 8.1–13.5)
POTASSIUM SERPL-SCNC: 3.7 MMOL/L (ref 3.7–5.3)
PROTEIN UA: ABNORMAL
RBC # BLD: 2.86 M/UL (ref 3.95–5.11)
RBC UA: ABNORMAL /HPF (ref 0–4)
RENAL EPITHELIAL, UA: ABNORMAL /HPF
SODIUM BLD-SCNC: 136 MMOL/L (ref 135–144)
SPECIFIC GRAVITY UA: 1.01 (ref 1–1.03)
TOTAL PROTEIN, URINE: 180 MG/DL
TOTAL PROTEIN, URINE: 238 MG/DL
TOTAL PROTEIN: 6.6 G/DL (ref 6.4–8.3)
TRICHOMONAS: ABNORMAL
TURBIDITY: CLEAR
URINE HGB: ABNORMAL
URINE TOTAL PROTEIN CREATININE RATIO: 5.9 (ref 0–0.2)
UROBILINOGEN, URINE: NORMAL
WBC # BLD: 8 K/UL (ref 3.5–11.3)
WBC UA: ABNORMAL /HPF (ref 0–5)
YEAST: ABNORMAL

## 2021-05-29 PROCEDURE — 80053 COMPREHEN METABOLIC PANEL: CPT

## 2021-05-29 PROCEDURE — 2580000003 HC RX 258: Performed by: INTERNAL MEDICINE

## 2021-05-29 PROCEDURE — 36415 COLL VENOUS BLD VENIPUNCTURE: CPT

## 2021-05-29 PROCEDURE — 99232 SBSQ HOSP IP/OBS MODERATE 35: CPT | Performed by: INTERNAL MEDICINE

## 2021-05-29 PROCEDURE — 85027 COMPLETE CBC AUTOMATED: CPT

## 2021-05-29 PROCEDURE — 6370000000 HC RX 637 (ALT 250 FOR IP): Performed by: NURSE PRACTITIONER

## 2021-05-29 PROCEDURE — 99221 1ST HOSP IP/OBS SF/LOW 40: CPT | Performed by: INTERNAL MEDICINE

## 2021-05-29 PROCEDURE — 99232 SBSQ HOSP IP/OBS MODERATE 35: CPT | Performed by: NEUROLOGICAL SURGERY

## 2021-05-29 PROCEDURE — 6370000000 HC RX 637 (ALT 250 FOR IP): Performed by: INTERNAL MEDICINE

## 2021-05-29 PROCEDURE — 1200000000 HC SEMI PRIVATE

## 2021-05-29 PROCEDURE — 82570 ASSAY OF URINE CREATININE: CPT

## 2021-05-29 PROCEDURE — 81001 URINALYSIS AUTO W/SCOPE: CPT

## 2021-05-29 PROCEDURE — 84156 ASSAY OF PROTEIN URINE: CPT

## 2021-05-29 PROCEDURE — 86335 IMMUNFIX E-PHORSIS/URINE/CSF: CPT

## 2021-05-29 RX ORDER — DOCUSATE SODIUM 100 MG/1
100 CAPSULE, LIQUID FILLED ORAL DAILY
Status: DISCONTINUED | OUTPATIENT
Start: 2021-05-29 | End: 2021-05-31

## 2021-05-29 RX ADMIN — HYDROCODONE BITARTRATE AND ACETAMINOPHEN 1 TABLET: 5; 325 TABLET ORAL at 11:17

## 2021-05-29 RX ADMIN — SODIUM CHLORIDE, PRESERVATIVE FREE 10 ML: 5 INJECTION INTRAVENOUS at 20:15

## 2021-05-29 RX ADMIN — LEVOTHYROXINE SODIUM 50 MCG: 50 TABLET ORAL at 05:09

## 2021-05-29 RX ADMIN — HYDROCODONE BITARTRATE AND ACETAMINOPHEN 1 TABLET: 5; 325 TABLET ORAL at 06:38

## 2021-05-29 RX ADMIN — HYDROCODONE BITARTRATE AND ACETAMINOPHEN 2 TABLET: 5; 325 TABLET ORAL at 23:56

## 2021-05-29 RX ADMIN — DOCUSATE SODIUM 100 MG: 100 CAPSULE ORAL at 18:04

## 2021-05-29 RX ADMIN — ATORVASTATIN CALCIUM 20 MG: 20 TABLET, FILM COATED ORAL at 20:14

## 2021-05-29 RX ADMIN — SENNOSIDES 8.6 MG: 8.6 TABLET, FILM COATED ORAL at 20:14

## 2021-05-29 RX ADMIN — HYDROCODONE BITARTRATE AND ACETAMINOPHEN 1 TABLET: 5; 325 TABLET ORAL at 17:14

## 2021-05-29 RX ADMIN — LATANOPROST 1 DROP: 50 SOLUTION OPHTHALMIC at 20:15

## 2021-05-29 ASSESSMENT — PAIN DESCRIPTION - PROGRESSION
CLINICAL_PROGRESSION: NOT CHANGED

## 2021-05-29 ASSESSMENT — PAIN SCALES - GENERAL
PAINLEVEL_OUTOF10: 6
PAINLEVEL_OUTOF10: 6
PAINLEVEL_OUTOF10: 8
PAINLEVEL_OUTOF10: 6
PAINLEVEL_OUTOF10: 5

## 2021-05-29 ASSESSMENT — PAIN DESCRIPTION - LOCATION: LOCATION: BACK

## 2021-05-29 ASSESSMENT — PAIN DESCRIPTION - DESCRIPTORS: DESCRIPTORS: ACHING

## 2021-05-29 ASSESSMENT — PAIN - FUNCTIONAL ASSESSMENT: PAIN_FUNCTIONAL_ASSESSMENT: PREVENTS OR INTERFERES SOME ACTIVE ACTIVITIES AND ADLS

## 2021-05-29 ASSESSMENT — PAIN DESCRIPTION - FREQUENCY: FREQUENCY: CONTINUOUS

## 2021-05-29 ASSESSMENT — PAIN DESCRIPTION - PAIN TYPE: TYPE: ACUTE PAIN

## 2021-05-29 ASSESSMENT — PAIN DESCRIPTION - ONSET: ONSET: ON-GOING

## 2021-05-29 ASSESSMENT — PAIN DESCRIPTION - ORIENTATION: ORIENTATION: LOWER

## 2021-05-29 NOTE — PROGRESS NOTES
Medications   Medication Dose Route Frequency Provider Last Rate Last Admin    HYDROcodone-acetaminophen ([employer]) 5-325 MG per tablet 1 tablet  1 tablet Oral Q4H PRN Maple Srinivasan, APRN - CNP   1 tablet at 05/29/21 1117    Or    HYDROcodone-acetaminophen (NORCO) 5-325 MG per tablet 2 tablet  2 tablet Oral Q4H PRN Maple [place], APRN - CNP   2 tablet at 05/28/21 2327    senna (SENOKOT) tablet 8.6 mg  1 tablet Oral Nightly Maple Srinivasan, APRN - CNP   8.6 mg at 05/28/21 2145    0.9 % sodium chloride infusion   Intravenous Continuous Jaqueline Fisher  mL/hr at 05/28/21 1530 Rate Change at 05/28/21 1530    sodium chloride flush 0.9 % injection 5-40 mL  5-40 mL Intravenous 2 times per day Usha Milelr MD   10 mL at 05/28/21 2024    sodium chloride flush 0.9 % injection 5-40 mL  5-40 mL Intravenous PRN Usha Miller MD        0.9 % sodium chloride infusion  25 mL Intravenous PRN Usha Miller MD        nicotine (NICODERM CQ) 21 MG/24HR 1 patch  1 patch Transdermal Daily PRN Usha Miller MD        acetaminophen (TYLENOL) tablet 650 mg  650 mg Oral Q6H PRN Usha Miller MD   650 mg at 05/27/21 2147    Or    acetaminophen (TYLENOL) suppository 650 mg  650 mg Rectal Q6H PRN Usha Miller MD        heparin (porcine) injection 5,000 Units  5,000 Units Subcutaneous 3 times per day Usha Miller MD        latanoprost (XALATAN) 0.005 % ophthalmic solution 1 drop  1 drop Both Eyes Nightly Sigifredo Cindy, APRN - NP   1 drop at 05/28/21 2026    levothyroxine (SYNTHROID) tablet 50 mcg  50 mcg Oral Daily Sigifredo Cindy, APRN - NP   50 mcg at 05/29/21 0509    atorvastatin (LIPITOR) tablet 20 mg  20 mg Oral Daily Sigifredo Cindy, APRN - NP   20 mg at 05/28/21 2024    sodium chloride flush 0.9 % injection 10 mL  10 mL Intravenous PRN Kat Roseau, DO           Allergies:  Patient has no known allergies. Social History:   reports that she has never smoked.  She has never used smokeless tobacco. She reports previous alcohol use. She reports that she does not use drugs. Family History: family history includes No Known Problems in her father and mother. REVIEW OF SYSTEMS:      Constitutional: No fever or chills. No night sweats, no weight loss   Eyes: No eye discharge, double vision, or eye pain   HEENT: negative for sore mouth, sore throat, hoarseness and voice change   Respiratory: negative for cough , sputum, dyspnea, wheezing, hemoptysis, chest pain   Cardiovascular: negative for chest pain, dyspnea, palpitations, orthopnea, PND   Gastrointestinal: negative for nausea, vomiting, diarrhea, constipation, abdominal pain, Dysphagia, hematemesis and hematochezia   Genitourinary: negative for frequency, dysuria, nocturia, urinary incontinence, and hematuria   Integument: negative for rash, skin lesions, bruises.    Hematologic/Lymphatic: negative for easy bruising, bleeding, lymphadenopathy, or petechiae   Endocrine: negative for heat or cold intolerance,weight changes, change in bowel habits and hair loss   Musculoskeletal: negative for myalgias, arthralgias, pain, joint swelling,and bone pain   Neurological: negative for headaches, dizziness, seizures, weakness, numbness    PHYSICAL EXAM:        BP (!) 178/82   Pulse 88   Temp 97.8 °F (36.6 °C)   Resp 16   Ht 5' (1.524 m)   Wt 99 lb 10.4 oz (45.2 kg)   SpO2 96%   BMI 19.46 kg/m²    Temp (24hrs), Av.7 °F (36.5 °C), Min:97.6 °F (36.4 °C), Max:97.8 °F (36.6 °C)      General appearance - well appearing, no in pain or distress   Mental status - alert and cooperative   Eyes - pupils equal and reactive, extraocular eye movements intact   Ears - bilateral TM's and external ear canals normal   Mouth - mucous membranes moist, pharynx normal without lesions   Neck - supple, no significant adenopathy   Lymphatics - no palpable lymphadenopathy, no hepatosplenomegaly   Chest - clear to auscultation, no wheezes, rales or rhonchi, symmetric air entry   Heart - normal rate, regular rhythm, normal S1, S2, no murmurs  Abdomen - soft, nontender, nondistended, no masses or organomegaly   Neurological - alert, oriented, normal speech, no focal findings or movement disorder noted   Musculoskeletal - no joint tenderness, deformity or swelling   Extremities - peripheral pulses normal, no pedal edema, no clubbing or cyanosis   Skin - normal coloration and turgor, no rashes, no suspicious skin lesions noted ,      DATA:      Labs:     Results for orders placed or performed during the hospital encounter of 05/27/21   Urinalysis with microscopic   Result Value Ref Range    Color, UA ORANGE (A) YELLOW    Turbidity UA CLEAR CLEAR    Glucose, Ur NEGATIVE NEGATIVE    Bilirubin Urine NEGATIVE NEGATIVE    Ketones, Urine NEGATIVE NEGATIVE    Specific Gravity, UA 1.014 1.005 - 1.030    Urine Hgb LARGE (A) NEGATIVE    pH, UA 7.0 5.0 - 8.0    Protein, UA 2+ (A) NEGATIVE    Urobilinogen, Urine Normal Normal    Nitrite, Urine NEGATIVE NEGATIVE    Leukocyte Esterase, Urine TRACE (A) NEGATIVE    -          WBC, UA 10 TO 20 0 - 5 /HPF    RBC, UA TOO NUMEROUS TO COUNT 0 - 4 /HPF    Casts UA  0 - 8 /LPF     2 TO 5 HYALINE Reference range defined for non-centrifuged specimen. Crystals, UA NOT REPORTED None /HPF    Epithelial Cells UA 0 TO 2 0 - 5 /HPF    Renal Epithelial, UA NOT REPORTED 0 /HPF    Bacteria, UA NOT REPORTED None    Mucus, UA NOT REPORTED None    Trichomonas, UA NOT REPORTED None    Amorphous, UA NOT REPORTED None    Other Observations UA NOT REPORTED NOT REQ.     Yeast, UA NOT REPORTED None   Sodium, urine, random   Result Value Ref Range    Sodium,Ur 110 mmol/L   Protein, urine, random   Result Value Ref Range    Total Protein, Urine 354 mg/dL   Osmolality, urine   Result Value Ref Range    Osmolality, Ur 421 80 - 1300 mOsm/kg   Creatinine, urine, random   Result Value Ref Range    Creatinine, Ur 54.7 28.0 - 217.0 mg/dL   CBC   Result Value Ref Range    WBC 7.5 3.5 - 11.3 k/uL    RBC 3.29 (L) 3.95 - 5.11 m/uL    Hemoglobin 9.5 (L) 11.9 - 15.1 g/dL    Hematocrit 30.1 (L) 36.3 - 47.1 %    MCV 91.5 82.6 - 102.9 fL    MCH 28.9 25.2 - 33.5 pg    MCHC 31.6 28.4 - 34.8 g/dL    RDW 13.6 11.8 - 14.4 %    Platelets 846 880 - 925 k/uL    MPV 9.8 8.1 - 13.5 fL    NRBC Automated 0.0 0.0 per 100 WBC   Comprehensive Metabolic Panel w/ Reflex to MG   Result Value Ref Range    Glucose 154 (H) 70 - 99 mg/dL    BUN 27 (H) 8 - 23 mg/dL    CREATININE 1.53 (H) 0.50 - 0.90 mg/dL    Bun/Cre Ratio NOT REPORTED 9 - 20    Calcium 9.1 8.6 - 10.4 mg/dL    Sodium 142 135 - 144 mmol/L    Potassium 4.1 3.7 - 5.3 mmol/L    Chloride 105 98 - 107 mmol/L    CO2 23 20 - 31 mmol/L    Anion Gap 14 9 - 17 mmol/L    Alkaline Phosphatase 52 35 - 104 U/L    ALT 11 5 - 33 U/L    AST 16 <32 U/L    Total Bilirubin 0.24 (L) 0.3 - 1.2 mg/dL    Total Protein 7.2 6.4 - 8.3 g/dL    Albumin 3.2 (L) 3.5 - 5.2 g/dL    Albumin/Globulin Ratio 0.8 (L) 1.0 - 2.5    GFR Non- 33 (L) >60 mL/min    GFR African American 40 (L) >60 mL/min    GFR Comment          GFR Staging NOT REPORTED    Magnesium   Result Value Ref Range    Magnesium 1.8 1.6 - 2.6 mg/dL   Protime-INR   Result Value Ref Range    Protime 12.4 (H) 9.1 - 12.3 sec    INR 1.2    Vitamin B12 & Folate   Result Value Ref Range    Vitamin B-12 857 232 - 1245 pg/mL    Folate >20.0 >4.8 ng/mL   Iron and TIBC   Result Value Ref Range    Iron 75 37 - 145 ug/dL    TIBC 215 (L) 250 - 450 ug/dL    Iron Saturation 35 20 - 55 %    UIBC 140 112 - 347 ug/dL   Ferritin   Result Value Ref Range    Ferritin 292 (H) 13 - 150 ug/L   Reticulocytes   Result Value Ref Range    Retic % 1.6 0.5 - 1.9 %    Absolute Retic # 0.050 0.030 - 0.080 M/uL    Immature Retic Fract 12.400 2.7 - 18.3 %    Retic Hemoglobin 30.8 28.2 - 35.7 pg   TSH with Reflex   Result Value Ref Range    TSH 7.01 (H) 0.30 - 5.00 mIU/L   Electrophoresis Protein, Serum without Reflex to Immunofixation   Result Value Ref Range    Total Protein 6.8 6.4 - 8.3 g/dL    Albumin (calculated) 3.3 3.2 - 5.2 g/dL    Albumin % 49 45 - 65 %    Alpha-1-Globulin 0.2 0.1 - 0.4 g/dL    Alpha 1 % 3 3 - 6 %    Alpha-2-Globulin 0.9 0.5 - 0.9 g/dL    Alpha 2 % 13 6 - 13 %    Beta Globulin 2.1 (H) 0.5 - 1.1 g/dL    Beta Percent 31 (H) 11 - 19 %    Gamma Globulin 0.3 (L) 0.5 - 1.5 g/dL    Gamma Globulin % 4 (L) 9 - 20 %    Total Prot. Sum 6.8 6.3 - 8.2 g/dL    Total Prot. Sum,% 100 98 - 102 %    Protein Electrophoresis, Serum       A ZONE OF RESTRICTION IS PRESENT IN THE BETA GLOBULIN REGION. CONFIRMED BY IMMUNOFIXATION TO BE MONOCLONAL    Pathologist ELECTRONICALLY SIGNED. Nimo Molina M.D. IMMUNOFIXATION SERUM PROFILE   Result Value Ref Range    Serum IFX Interp       A ZONE OF RESTRICTION IS PRESENT IN THE BETA GLOBULIN REGION. CONFIRMED BY IMMUNOFIXATION TO BE MONOCLONAL    Pathologist ELECTRONICALLY SIGNED. Nimo Molina M.D.    KAPPA/LAMBDA QUANT FREE LIGHT CHAINS SERUM   Result Value Ref Range    Kappa Free Light Chains QNT 0.97 0.37 - 1.94 mg/dL    Lambda Free Light Chains .34 (H) 0.57 - 2.63 mg/dL    Free Kappa/Lambda Ratio 0.01 (L) 0.26 - 1.65   IMMUNOGLOBULINS PANEL   Result Value Ref Range    IgG <300 (L) 700 - 1600 mg/dL    IgA 2250 (H) 70 - 400 mg/dL    IgM <25 (L) 40 - 230 mg/dL   T4, Free   Result Value Ref Range    Thyroxine, Free 1.35 0.93 - 1.70 ng/dL   PROTEIN ELECTROPHORESIS, URINE   Result Value Ref Range    Specimen Type . URINE     Urine Total Protein 180 mg/dL    P E Interpretation, U PENDING     Pathologist PENDING    CBC   Result Value Ref Range    WBC 7.6 3.5 - 11.3 k/uL    RBC 3.08 (L) 3.95 - 5.11 m/uL    Hemoglobin 8.7 (L) 11.9 - 15.1 g/dL    Hematocrit 29.5 (L) 36.3 - 47.1 %    MCV 95.8 82.6 - 102.9 fL    MCH 28.2 25.2 - 33.5 pg    MCHC 29.5 28.4 - 34.8 g/dL    RDW 13.3 11.8 - 14.4 %    Platelets 614 245 - 612 k/uL    MPV 9.4 8.1 - 13.5 fL    NRBC Automated 0.0 0.0 per 100 WBC   Comprehensive Metabolic Panel w/ Reflex to MG   Result Value Ref Range    Glucose 83 70 - 99 mg/dL    BUN 25 (H) 8 - 23 mg/dL    CREATININE 1.61 (H) 0.50 - 0.90 mg/dL    Bun/Cre Ratio NOT REPORTED 9 - 20    Calcium 8.5 (L) 8.6 - 10.4 mg/dL    Sodium 140 135 - 144 mmol/L    Potassium 3.9 3.7 - 5.3 mmol/L    Chloride 107 98 - 107 mmol/L    CO2 23 20 - 31 mmol/L    Anion Gap 10 9 - 17 mmol/L    Alkaline Phosphatase 47 35 - 104 U/L    ALT 9 5 - 33 U/L    AST 15 <32 U/L    Total Bilirubin 0.20 (L) 0.3 - 1.2 mg/dL    Total Protein 6.8 6.4 - 8.3 g/dL    Albumin 3.0 (L) 3.5 - 5.2 g/dL    Albumin/Globulin Ratio 0.8 (L) 1.0 - 2.5    GFR Non- 31 (L) >60 mL/min    GFR  38 (L) >60 mL/min    GFR Comment          GFR Staging NOT REPORTED    BETA 2 MICROGLOBULIN, SERUM   Result Value Ref Range    Beta-2 Microglobulin 6.9 (H) 0.6 - 2.4 mg/L   Protein / Creatinine Ratio, Urine   Result Value Ref Range    Total Protein, Urine 180 mg/dL    Creatinine, Ur 30.5 28.0 - 217.0 mg/dL    Urine Total Protein Creatinine Ratio 5.90 (H) 0.00 - 0.20   URINALYSIS WITH MICROSCOPIC   Result Value Ref Range    Color, UA YELLOW YELLOW    Turbidity UA CLEAR CLEAR    Glucose, Ur NEGATIVE NEGATIVE    Bilirubin Urine NEGATIVE NEGATIVE    Ketones, Urine NEGATIVE NEGATIVE    Specific Gravity, UA 1.010 1.005 - 1.030    Urine Hgb LARGE (A) NEGATIVE    pH, UA 6.0 5.0 - 8.0    Protein, UA 2+ (A) NEGATIVE    Urobilinogen, Urine Normal Normal    Nitrite, Urine NEGATIVE NEGATIVE    Leukocyte Esterase, Urine NEGATIVE NEGATIVE    -          WBC, UA 5 TO 10 0 - 5 /HPF    RBC, UA TOO NUMEROUS TO COUNT 0 - 4 /HPF    Casts UA  0 - 8 /LPF     2 TO 5 HYALINE Reference range defined for non-centrifuged specimen.     Crystals, UA NOT REPORTED None /HPF    Epithelial Cells UA 0 TO 2 0 - 5 /HPF    Renal Epithelial, UA NOT REPORTED 0 /HPF    Bacteria, UA NOT REPORTED None    Mucus, UA NOT REPORTED None    Trichomonas, UA NOT REPORTED None    Amorphous, UA NOT REPORTED None Other Observations UA NOT REPORTED NOT REQ. Yeast, UA NOT REPORTED None   CBC   Result Value Ref Range    WBC 8.0 3.5 - 11.3 k/uL    RBC 2.86 (L) 3.95 - 5.11 m/uL    Hemoglobin 8.1 (L) 11.9 - 15.1 g/dL    Hematocrit 27.5 (L) 36.3 - 47.1 %    MCV 96.2 82.6 - 102.9 fL    MCH 28.3 25.2 - 33.5 pg    MCHC 29.5 28.4 - 34.8 g/dL    RDW 13.4 11.8 - 14.4 %    Platelets 537 952 - 442 k/uL    MPV 9.7 8.1 - 13.5 fL    NRBC Automated 0.0 0.0 per 100 WBC   Comprehensive Metabolic Panel w/ Reflex to MG   Result Value Ref Range    Glucose 82 70 - 99 mg/dL    BUN 25 (H) 8 - 23 mg/dL    CREATININE 1.18 (H) 0.50 - 0.90 mg/dL    Bun/Cre Ratio NOT REPORTED 9 - 20    Calcium 8.6 8.6 - 10.4 mg/dL    Sodium 136 135 - 144 mmol/L    Potassium 3.7 3.7 - 5.3 mmol/L    Chloride 105 98 - 107 mmol/L    CO2 22 20 - 31 mmol/L    Anion Gap 9 9 - 17 mmol/L    Alkaline Phosphatase 43 35 - 104 U/L    ALT 9 5 - 33 U/L    AST 15 <32 U/L    Total Bilirubin 0.15 (L) 0.3 - 1.2 mg/dL    Total Protein 6.6 6.4 - 8.3 g/dL    Albumin 3.0 (L) 3.5 - 5.2 g/dL    Albumin/Globulin Ratio 0.8 (L) 1.0 - 2.5    GFR Non- 45 (L) >60 mL/min    GFR  54 (L) >60 mL/min    GFR Comment          GFR Staging NOT REPORTED    POC Glucose Fingerstick   Result Value Ref Range    POC Glucose 82 65 - 105 mg/dL   POC Glucose Fingerstick   Result Value Ref Range    POC Glucose 96 65 - 105 mg/dL   TYPE AND SCREEN   Result Value Ref Range    Expiration Date 05/31/2021,2359     Arm Band Number [id number]     ABO/Rh A POSITIVE     Antibody Screen NEGATIVE    BLOOD BANK SPECIMEN   Result Value Ref Range    Blood Bank Specimen NOT REPORTED          IMAGING DATA:    US RENAL COMPLETE    Result Date: 5/27/2021  EXAMINATION: RETROPERITONEAL ULTRASOUND OF THE KIDNEYS AND URINARY BLADDER 5/27/2021 COMPARISON: None HISTORY: ORDERING SYSTEM PROVIDED HISTORY: ARF TECHNOLOGIST PROVIDED HISTORY: US RETROPERITONEAL COMPLETE ARF FINDINGS: Kidneys:  The right kidney measures 10.0 x 4.0 x 3.5 cm and the left kidney measures 10.5 x 3.3 x 5.2 cm. Cortical thickness within normal limits bilaterally. There is diffuse increased renal parenchymal echogenicity bilaterally with increased prominence of the relative hypoechoic renal pyramids. A tiny 7 x 5 x 6 mm right lower pole renal cyst is noted. No other focal renal lesion. No hydronephrosis in either kidney. No sonographic evidence for renal calculi. Bladder: Unremarkable appearance of the bladder. No dilated distal ureters. The patient did not have the urge to void. Bilateral ureteral jets were identified. Diffuse increased renal parenchymal echogenicity bilaterally consistent with medical renal disease. No hydronephrosis in either kidney. IMPRESSION:   Primary Problem  Compression fracture of lumbar vertebra 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 Pathological fracture of L2 ? Differentials could be benign fracture, primary bone tumor or metastatic cancer with unknown primary at this point. Will need more work-up. 2. AMER[place]  3. 6 mm retropulsion of spinal canal with moderate canal narrowing    RECOMMENDATIONS:    1. No changes clinically. Evaluated by neurosurgery. Plan for resection of spinal lesion next week. 2. Discussed the findings on the labs with IgA lambda multiple myeloma.   I explained again the nature of myeloma, staging, prognosis and treatment. 3. Further treatment for myeloma will be after the surgical resection and recovery. She will benefit from local radiation therapy followed by systemic treatment with targeted agents. All explained to the patient and the family. [address] [place] [address] [facility]                            This note is created with the assistance of a speech recognition program.  While intending to generate a document that actually reflects the content of the visit, the document can still have some errors including those of syntax and sound a like substitutions which may escape proof reading. It such instances, actual meaning can be extrapolated by contextual diversion.

## 2021-05-29 NOTE — CONSULTS
NEPHROLOGY     REASON FOR CONSULT:     AMERICO (BUN/Creat    1.6        with baseline creatinine normal                )      Risk Factors for AMERICO:  #1 Bactrim use #2 poor p.o. #3 NSAID  ACE inhibitor use    HISTORY OF PRESENTING ILLNESS                 This is a 77 y.o. female who was recommended for hospitalization when noted to have compression fracture of lumbar vertebrae L2 on imaging studies. Patient has been having back pain last 4 months. Her appetite is gone down and has lost 4 to 5 pounds. Because of persistent abdominal pain she was suspected of UTI and received Bactrim for 7 days prior to hospitalization. But her back pain persisted and imaging studies was ordered in which included CT scan and MRI which showed L2 burst fracture. There was also a lucency within the right side of the collapsed vertebral with concern of possible pathological fracture. She was transferred to our facility for neurosurgical evaluation and admission. Other investigations showed elevated creatinine. Work-up showed evidence of multiple myeloma. Hematology consultation has been asked for. Awaiting tissue diagnosis. We have been consulted for renal insufficiency. Baseline creatinine normal.  Creatinine peaked to 1.6 and it is already improving. Has put nephrotic range proteinuria. No prior studies available.     Urine output decent  No history of contrast exposure  No history of hypotension  No history suggestive of obstruction  No history of nausea/vomiting/diarrhoea  No history of AMERICO in past  No history of recurrent UTI infection/surgeries to KUB          PAST MEDICAL HISTORY         Diagnosis Date    Acute renal failure (ARF) ([address] 5/25/2021    Compression fracture of lumbar vertebra ([address] 5/27/2021    Essential hypertension 5/27/2021    Other hyperlipidemia 5/27/2021    Other specified hypothyroidism 5/27/2021       PAST SURGICAL HISTORY          Procedure Laterality Date    FINGER TRIGGER RELEASE Left MEDICATIONS     Home Meds:                Medications Prior to Admission: naproxen (NAPROSYN) 500 MG tablet, Take 500 mg by mouth 2 times daily (with meals)  latanoprost (XALATAN) 0.005 % ophthalmic solution, Place 1 drop into both eyes nightly  levothyroxine (SYNTHROID) 50 MCG tablet, Take 50 mcg by mouth Daily  lisinopril (PRINIVIL;ZESTRIL) 20 MG tablet, Take 20 mg by mouth daily  simvastatin (ZOCOR) 20 MG tablet, Take 20 mg by mouth nightly  amLODIPine (NORVASC) 5 MG tablet, Take 5 mg by mouth daily  aspirin 81 MG chewable tablet, Take 81 mg by mouth daily  Scheduled Meds:    senna  1 tablet Oral Nightly    sodium chloride flush  5-40 mL Intravenous 2 times per day    heparin (porcine)  5,000 Units Subcutaneous 3 times per day    latanoprost  1 drop Both Eyes Nightly    levothyroxine  50 mcg Oral Daily    atorvastatin  20 mg Oral Daily     Continuous Infusions:    sodium chloride 100 mL/hr at 05/28/21 1530    sodium chloride       PRN Meds:  HYDROcodone 5 mg - acetaminophen **OR** HYDROcodone 5 mg - acetaminophen, sodium chloride flush, sodium chloride, nicotine, acetaminophen **OR** acetaminophen, sodium chloride flush    ALLERGY     Patient has no known allergies.        SOCIAL HISTORY     Social History     Socioeconomic History    Marital status:      Spouse name: Not on file    Number of children: Not on file    Years of education: Not on file    Highest education level: Not on file   Occupational History    Not on file   Tobacco Use    Smoking status: Never Smoker    Smokeless tobacco: Never Used   Substance and Sexual Activity    Alcohol use: Not Currently    Drug use: Never    Sexual activity: Not on file   Other Topics Concern    Not on file   Social History Narrative    Not on file     Social Determinants of Health     Financial Resource Strain:     Difficulty of Paying Living Expenses:    Food Insecurity:     Worried About Running Out of Food in the Last Year:     Magdaleno of Food in the Last Year:    Transportation Needs:     Lack of Transportation (Medical):  Lack of Transportation (Non-Medical):    Physical Activity:     Days of Exercise per Week:     Minutes of Exercise per Session:    Stress:     Feeling of Stress :    Social Connections:     Frequency of Communication with Friends and Family:     Frequency of Social Gatherings with Friends and Family:     Attends Judaism Services:     Active Member of Clubs or Organizations:     Attends Club or Organization Meetings:     Marital Status:    Intimate Partner Violence:     Fear of Current or Ex-Partner:     Emotionally Abused:     Physically Abused:     Sexually Abused:        FAMILY HISTORY     Family History   Problem Relation Age of Onset    No Known Problems Mother     No Known Problems Father           REVIEW OF SYSTEM     Constitutional: No asthenia/weight loss/anorexia    HEENT : No epistaxis/visual blurriness/rhinorrhoea/sorethroat/trauma  Cardiovascular:No chest pain/palpitation/SOB  Respiratory: No cough/fever/SOB/Wheezing    Gastrointestinal: No abdominal pain/nausea/vomiting/diarrhoea/constipation  Genitourinary: No dysuria/pyuria/hematuria/incomplete emptying of bladder  Musculoskeletal:  No gait disturbance/weakness or joint complaints present back pain  Integumentary: No rash or pruritis. Neurological: No headache/diplopia/change in muscle strength/numbness or tingling. No change in gait, balance, coordination, mood, affect, memory, mentation, behavior. Psychiatric: No anxiety/depression. Endocrine: No temperature intolerance. No excessive thirst, fluid intake, or urination. No tremor. Hematologic/Lymphatic: No abnormal bruising or bleeding, blood clots or swolle lymph nodes.   Allergic/Immunologic: No nasal congestion or hives      PHYSICAL EXAM     Vitals:    05/28/21 0750 05/28/21 2046 05/29/21 0510 05/29/21 0843   BP: (!) 147/82 (!) 143/85  (!) 178/82   Pulse: 67 74  88   Resp: 16 16  16 Temp: 97.9 °F (36.6 °C) 97.6 °F (36.4 °C)  97.8 °F (36.6 °C)   TempSrc: Oral Oral     SpO2: 94% 92%  96%   Weight:   99 lb 10.4 oz (45.2 kg)    Height:         24HR INTAKE/OUTPUT:      Intake/Output Summary (Last 24 hours) at 5/29/2021 1327  Last data filed at 5/29/2021 0510  Gross per 24 hour   Intake    Output 1725 ml   Net -1725 ml       General appearance:Awake, alert, in no acute distress  Skin: warm and dry, no rash or erythema  Eyes: conjunctivae normal and sclera anicteric  ENT: no thrush no pharyngeal congestion  orodental hygiene   Neck: No JVD, Lymphadenopathty or thyromegaly  Respiratory: vesicular breath sounds,no wheeze/crackles  Cardiovascular: S1 S2 normal,no gallop or organic murmur. No carotid bruit  Abdomen:Non tender/non distended. Bowel sounds present  Extremities: No Cyanosis or Clubbing,Lower extremity edema  Neurological:Alert and oriented. No abnormalities of mood, affect, memory, mentation, or behavior are noted    INVESTIGATIONS      CBC:   Recent Labs     05/27/21  0944 05/28/21  0541 05/29/21  0647   WBC 7.5 7.6 8.0   RBC 3.29* 3.08* 2.86*   HGB 9.5* 8.7* 8.1*   HCT 30.1* 29.5* 27.5*   MCV 91.5 95.8 96.2   MCH 28.9 28.2 28.3   MCHC 31.6 29.5 29.5   RDW 13.6 13.3 13.4    234 232   MPV 9.8 9.4 9.7      BMP:   Recent Labs     05/27/21  0944 05/28/21  0541 05/29/21  0647    140 136   K 4.1 3.9 3.7    107 105   CO2 23 23 22   BUN 27* 25* 25*   CREATININE 1.53* 1.61* 1.18*   GLUCOSE 154* 83 82   CALCIUM 9.1 8.5* 8.6        Phosphorus:  No results for input(s): PHOS in the last 72 hours. Magnesium:   Recent Labs     05/27/21  0944   MG 1.8     Albumin:   Recent Labs     05/27/21  0944 05/28/21  0541 05/29/21  0647   LABALBU 3.2* 3.0* 3.0*       Radiology:  Reviewed as available. ASSESSMENT     1. Acute kidney injury secondary to prerenal injury from hypoperfusion related to poor p.o./nonsteroidal anti-inflammatory agent use and concomitant ACE inhibitor -improving. Creatinine peaked to 1.6  Baseline creatinine normal  She was also on Bactrim prior to hospitalization  2. Nephrotic range proteinuria likely from myeloma  3. Pathology for L2 fracture  4. High suspicion for multiple myeloma  5. Essential hypertension  6. Hypothyroidism     PLAN:     Continue IV fluids  Recheck UPC  Awaiting tissue diagnosis prior to initiation of treatment for multiple myeloma  Avoid lisinopril and nonsteroidal anti-inflammatory agents  We will follow       Thank you for the consultation. Please do not hesitate to call with questions. This note is created with the assistance of a speech-recognition program. While intending to generate a document that actually reflects the content of the visit, no guarantees can be provided that every mistake has been identified and corrected by editing.     Chantelle Luke MD MD, MRCP Ling Molina, FACP   5/29/2021 1:27 PM    [facility]

## 2021-05-29 NOTE — PROGRESS NOTES
Neurosurgery Resident  Daily Progress Note    5/29/2021  7:30 AM    Chart reviewed. No acute events overnight. No new complaints. Minimal pain while in bed. Vitals:    05/28/21 0600 05/28/21 0750 05/28/21 2046 05/29/21 0510   BP:  (!) 147/82 (!) 143/85    Pulse:  67 74    Resp:  16 16    Temp:  97.9 °F (36.6 °C) 97.6 °F (36.4 °C)    TempSrc:  Oral Oral    SpO2:  94% 92%    Weight: 91 lb 4.3 oz (41.4 kg)   99 lb 10.4 oz (45.2 kg)   Height:           PE:   Gen: AOx3, NAD  Resp: equal air entry bilaterally with no audible wheezing or crackles  Abd: soft, no rigidity, no guarding, ND/NT  Neuro:  L delt 5/5, R delt 5/5  L biceps 5/5, R biceps 5/5  L triceps 5/5, R biceps 5/5  L intrinsics 5/5, R intrinsics 5/5    L psoas 5/5, R psoas 5/5  L quads 5/5, R quads 5/5  L ankle DF 5/5, R ankle DF 5/5  L ankle PF 5/5, R ankle PF 5/5    Sensation intact bilateral lower extremities. Lab Results   Component Value Date    WBC 8.0 05/29/2021    HGB 8.1 (L) 05/29/2021    HCT 27.5 (L) 05/29/2021     05/29/2021    ALT 9 05/28/2021    AST 15 05/28/2021     05/28/2021    K 3.9 05/28/2021     05/28/2021    CREATININE 1.61 (H) 05/28/2021    BUN 25 (H) 05/28/2021    CO2 23 05/28/2021    TSH 7.01 (H) 05/27/2021    INR 1.2 05/27/2021       77 y.o. female who presents with pathological fracture L2 with canal compression, presumed myeloma. - No plans for CT chest/abdomen/pelvis per Hem-Onc   - Plan for neurosurgical intervention next week, likely 6/3/21.   - TLSO brace when out of bed. - Appreciate surgical clearance/risk stratification.   - Hem-Onc following. Patient care will be discussed with attending, will reevaluate patient along with attending. Please contact neurosurgery with any changes in patients neurologic status.       Derrick Quiñones,  7:30 AM

## 2021-05-29 NOTE — PROGRESS NOTES
[facility]  Office: [address], DO, Luc Batista, DO, Eduard Alberto, DO, [facility], DO, Ambrosio Lechuga MD, Ashely Medrano MD, Nahed Chavez MD, Anabell Stevenson MD, Inez Bennett MD, Lubna Banegas MD, Francisca Quiñones MD, Jose Carlos Paniagua MD, Macy Arciniega DO, Darian Breaux MD, Marian Fang DO, Pk Sullivan MD,  Radha Gonsalez, DO, Kike Jurado MD, Val Lubin MD, Sara Aquino MD, Jessi Mccarty MD, Hildegard Bumpers, [facility], [facility], CNP, Jessica Olivares, [facility], Panfilo Jones, CNS, Shahzad Issa, CNP, Sagar Noriega, CNP, Mandy Pillai, CNP, Yury Gipson, CNP, Abel Mccormack, CNP, Raad Crocker PA-C, Jenny Olvera, [facility], Sheri Bang, CNP, Magno Pruitt, CNP, Cassandra Mcguire, CNP, Leopold Saras, CNP, Whitney Wagner, CNP, Roderick Ford, [address] [place] [address]    Progress Note    5/29/2021    4:36 PM    Name:   Alana Price  MRN:     [id number]     Acct:      [de-identified]   Room:   [address] [place] [address] Day:  2  Admit Date:  5/27/2021  2:18 AM    PCP:   No primary care provider on file. Code Status:  Full Code    Subjective:     C/C: Back pain. Interval History Status: not changed. Pt seen and examined. No acute events overnight. Pt is tolerating her diet in no distress. No lower ext numbness or weakness. No fevers or chills. Cr did get worse today despite IV hydration. Vital signs have been stable. Brief History: This is a 80-year-old female who initially presented to the hospital with complaints of increasing back pain. Initial MRI did show burst L2 fracture with 6 mm retropulsion of the spinal canal with moderate canal narrowing along with lucency in the right side. Patient was transferred for neurosurgery evaluation. MRI was concerning for pathologic fracture.   IgA level 2250, kappa lambda ratio 0.01 and of work-up pending    Review of Systems:     Constitutional:  negative for chills, fevers, sweats  Respiratory:  negative for cough, dyspnea on exertion, shortness of breath, wheezing  Cardiovascular:  negative for chest pain, chest pressure/discomfort, lower extremity edema, palpitations  Gastrointestinal:  negative for abdominal pain, constipation, diarrhea, nausea, vomiting  Neurological:  negative for dizziness, headache admits to back pain that's stable. Medications: Allergies:  No Known Allergies    Current Meds:   Scheduled Meds:    senna  1 tablet Oral Nightly    sodium chloride flush  5-40 mL Intravenous 2 times per day    heparin (porcine)  5,000 Units Subcutaneous 3 times per day    latanoprost  1 drop Both Eyes Nightly    levothyroxine  50 mcg Oral Daily    atorvastatin  20 mg Oral Daily     Continuous Infusions:    sodium chloride 100 mL/hr at 21 1530    sodium chloride       PRN Meds: HYDROcodone 5 mg - acetaminophen **OR** HYDROcodone 5 mg - acetaminophen, sodium chloride flush, sodium chloride, nicotine, acetaminophen **OR** acetaminophen, sodium chloride flush    Data:     Past Medical History:   has a past medical history of Acute renal failure (ARF) [address] Compression fracture of lumbar vertebra ([facility] Essential hypertension, Other hyperlipidemia, and Other specified hypothyroidism. Social History:   reports that she has never smoked. She has never used smokeless tobacco. She reports previous alcohol use. She reports that she does not use drugs. Family History:   Family History   Problem Relation Age of Onset    No Known Problems Mother     No Known Problems Father        Vitals:  BP (!) 178/82   Pulse 88   Temp 97.8 °F (36.6 °C)   Resp 16   Ht 5' (1.524 m)   Wt 99 lb 10.4 oz (45.2 kg)   SpO2 96%   BMI 19.46 kg/m²   Temp (24hrs), Av.7 °F (36.5 °C), Min:97.6 °F (36.4 °C), Max:97.8 °F (36.6 °C)    Recent Labs     21  0736 21   POCGLU 82 96       I/O (24Hr):     Intake/Output Summary (Last 24 hours) at 2021 [address] filed at 5/29/2021 0510  Gross per 24 hour   Intake    Output 1375 ml   Net -1375 ml       Labs:  Hematology:  Recent Labs     05/27/21  0944 05/28/21  0541 05/29/21  0647   WBC 7.5 7.6 8.0   RBC 3.29* 3.08* 2.86*   HGB 9.5* 8.7* 8.1*   HCT 30.1* 29.5* 27.5*   MCV 91.5 95.8 96.2   MCH 28.9 28.2 28.3   MCHC 31.6 29.5 29.5   RDW 13.6 13.3 13.4    234 232   MPV 9.8 9.4 9.7   INR 1.2  --   --      Chemistry:  Recent Labs     05/27/21 0944 05/28/21  0541 05/29/21  0647    140 136   K 4.1 3.9 3.7    107 105   CO2 23 23 22   GLUCOSE 154* 83 82   BUN 27* 25* 25*   CREATININE 1.53* 1.61* 1.18*   MG 1.8  --   --    ANIONGAP 14 10 9   LABGLOM 33* 31* 45*   GFRAA 40* 38* 54*   CALCIUM 9.1 8.5* 8.6     Recent Labs     05/27/21 0944 05/27/21  1412 05/28/21  0541 05/28/21  0736 05/28/21  2112 05/29/21  0647   PROT 7.2 6.8 6.8  --   --  6.6   LABALBU 3.2*  --  3.0*  --   --  3.0*   TSH 7.01*  --   --   --   --   --    AST 16  --  15  --   --  15   ALT 11  --  9  --   --  9   ALKPHOS 52  --  47  --   --  43   BILITOT 0.24*  --  0.20*  --   --  0.15*   POCGLU  --   --   --  82 96  --      ABG:No results found for: POCPH, PHART, PH, POCPCO2, PFI5AFQ, PCO2, POCPO2, PO2ART, PO2, POCHCO3, WNX2THD, HCO3, NBEA, PBEA, BEART, BE, THGBART, THB, ENW5TSQ, AFKK5BSI, N6HEJILE, O2SAT, FIO2  No results found for: SPECIAL  No results found for: CULTURE    Radiology:  MRI CERVICAL SPINE W WO CONTRAST    Result Date: 5/27/2021  No evidence of metastatic disease inside the spinal canal. C2 and C3 vertebral body demonstrate fat marrow signal and remainder of the cervical vertebral bodies have diffuse T1 and T2 hypointensity. C2 and C3 may have been exposed to prior radiation. The appearance of the remainder of vertebral bodies may be related to anemia or other diffuse marrow abnormalities. Anterior aspect of C4 has enhancing lesion. Finding may be related to atypical hemangioma or marrow infiltration.  At C5-C6, moderate canal stenosis and severe bilateral neural foraminal narrowing. Left foraminal disc protrusion/osteophytic ridging impinges on the exiting left C6 nerve root. Other mild to moderate degenerative changes of cervical spine as described. MRI THORACIC SPINE W WO CONTRAST    Addendum Date: 5/27/2021    ADDENDUM: Critical results were called by Dr. Governor Philipp MD to Rosario Mahmood on 5/27/2021 at 19:26. The reported infiltrative mass lesion within the upper lumbar spine may also be related to hematoma . Result Date: 5/27/2021  Discrete enhancing lesions involving the spinous processes of the vertebral bodies of T4, T6 and T7 and anterior aspect of vertebral body of T4 concerning for metastatic disease. Diffuse T1 and T2 hypointensity of the marrow which may be effect of anemia or chemotherapy. Only partially included on the acquired images there is a diffuse infiltrative mass lesion surrounding the anterior and lateral aspect of L1 vertebral body. Clinical correlation and if appropriate contrast enhanced CT of abdomen and pelvis is recommended. The findings were sent to the Radiology Results [address] at 7:10 pm on 5/27/2021to be communicated to a licensed caregiver. US RENAL COMPLETE    Result Date: 5/27/2021  Diffuse increased renal parenchymal echogenicity bilaterally consistent with medical renal disease. No hydronephrosis in either kidney.        Physical Examination:        General appearance:  alert, cooperative and no distress  Mental Status:  oriented to person, place and time and normal affect  Lungs:  clear to auscultation bilaterally, normal effort  Heart:  regular rate and rhythm, no murmur  Abdomen:  soft, nontender, nondistended, normal bowel sounds, no masses, hepatomegaly, splenomegaly  Extremities:  no edema, redness, tenderness in the calves  Skin:  no gross lesions, rashes, induration    Assessment:        Hospital Problems         Last Modified POA    * (Principal) Compression fracture of lumbar vertebra ([address] 5/27/2021 Yes    Acute renal failure (ARF) ([address] 5/27/2021 Yes    Essential hypertension 5/27/2021 Yes    Other hyperlipidemia 5/27/2021 Yes    Subclinical hypothyroidism 5/27/2021 Yes    Pathologic lumbar vertebral fracture 5/29/2021 Yes    Normocytic normochromic anemia 5/27/2021 Yes    Multiple myeloma not having achieved remission ([address] 5/27/2021 Yes    Moderate protein-calorie malnutrition ([address] 5/28/2021 Yes    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 Pathologic fracture of lumbar Vertebra: Neurosurgery consulted, plan for OR early next week. Continue PT/OT. TLSO brace ordered. Hold ASA,  2. Check urine/serum electrophoreses with immunofixation profile. Kappa/Lambda 0.97/136. Immunoglobulin panel: IGA: 2250. Oncology consulted, need tissue diagnosis. 3.  Check skeletal surveys. 4. Protein calorie malnutrition: liberalize diet, High calorie supplement. 5. Scr has been improving with IV hydration. Check urine protein to creatinine ratio to quantify proteinuria. Nephrology consulted. Continue IVF. 6. Monitor Hgb, transfuse prn for symptomatic anemia or Hgb <7  7. Continue Lipitor 20 mg daily. 8. Continue DVT ppx.  9. Continue Synthroid 50 mcg daily. 10. Risk stratification: patient is low/intermediate risk for surgery.        Paula Mcdonald DO  5/29/2021  4:36 PM \

## 2021-05-30 ENCOUNTER — APPOINTMENT (OUTPATIENT)
Dept: CT IMAGING | Age: 76
DRG: 456 | End: 2021-05-30
Attending: INTERNAL MEDICINE
Payer: MEDICARE

## 2021-05-30 LAB
ANION GAP SERPL CALCULATED.3IONS-SCNC: 11 MMOL/L (ref 9–17)
BUN BLDV-MCNC: 25 MG/DL (ref 8–23)
BUN/CREAT BLD: ABNORMAL (ref 9–20)
CALCIUM SERPL-MCNC: 8.7 MG/DL (ref 8.6–10.4)
CHLORIDE BLD-SCNC: 103 MMOL/L (ref 98–107)
CO2: 25 MMOL/L (ref 20–31)
CREAT SERPL-MCNC: 1.33 MG/DL (ref 0.5–0.9)
GFR AFRICAN AMERICAN: 47 ML/MIN
GFR NON-AFRICAN AMERICAN: 39 ML/MIN
GFR SERPL CREATININE-BSD FRML MDRD: ABNORMAL ML/MIN/{1.73_M2}
GFR SERPL CREATININE-BSD FRML MDRD: ABNORMAL ML/MIN/{1.73_M2}
GLUCOSE BLD-MCNC: 118 MG/DL (ref 70–99)
POTASSIUM SERPL-SCNC: 3.6 MMOL/L (ref 3.7–5.3)
SODIUM BLD-SCNC: 139 MMOL/L (ref 135–144)

## 2021-05-30 PROCEDURE — 36415 COLL VENOUS BLD VENIPUNCTURE: CPT

## 2021-05-30 PROCEDURE — 6370000000 HC RX 637 (ALT 250 FOR IP): Performed by: INTERNAL MEDICINE

## 2021-05-30 PROCEDURE — 99232 SBSQ HOSP IP/OBS MODERATE 35: CPT | Performed by: INTERNAL MEDICINE

## 2021-05-30 PROCEDURE — 76937 US GUIDE VASCULAR ACCESS: CPT

## 2021-05-30 PROCEDURE — 6370000000 HC RX 637 (ALT 250 FOR IP): Performed by: NURSE PRACTITIONER

## 2021-05-30 PROCEDURE — 1200000000 HC SEMI PRIVATE

## 2021-05-30 PROCEDURE — 99232 SBSQ HOSP IP/OBS MODERATE 35: CPT | Performed by: NEUROLOGICAL SURGERY

## 2021-05-30 PROCEDURE — 80048 BASIC METABOLIC PNL TOTAL CA: CPT

## 2021-05-30 PROCEDURE — 72131 CT LUMBAR SPINE W/O DYE: CPT

## 2021-05-30 RX ORDER — AMLODIPINE BESYLATE 5 MG/1
5 TABLET ORAL DAILY
Status: DISCONTINUED | OUTPATIENT
Start: 2021-05-30 | End: 2021-05-31

## 2021-05-30 RX ADMIN — HYDROCODONE BITARTRATE AND ACETAMINOPHEN 2 TABLET: 5; 325 TABLET ORAL at 06:28

## 2021-05-30 RX ADMIN — HYDROCODONE BITARTRATE AND ACETAMINOPHEN 1 TABLET: 5; 325 TABLET ORAL at 11:15

## 2021-05-30 RX ADMIN — SENNOSIDES 8.6 MG: 8.6 TABLET, FILM COATED ORAL at 20:29

## 2021-05-30 RX ADMIN — ATORVASTATIN CALCIUM 20 MG: 20 TABLET, FILM COATED ORAL at 20:29

## 2021-05-30 RX ADMIN — HYDROCODONE BITARTRATE AND ACETAMINOPHEN 2 TABLET: 5; 325 TABLET ORAL at 15:03

## 2021-05-30 RX ADMIN — LATANOPROST 1 DROP: 50 SOLUTION OPHTHALMIC at 20:33

## 2021-05-30 RX ADMIN — DOCUSATE SODIUM 100 MG: 100 CAPSULE ORAL at 07:55

## 2021-05-30 RX ADMIN — LEVOTHYROXINE SODIUM 50 MCG: 50 TABLET ORAL at 06:28

## 2021-05-30 RX ADMIN — HYDROCODONE BITARTRATE AND ACETAMINOPHEN 2 TABLET: 5; 325 TABLET ORAL at 18:53

## 2021-05-30 RX ADMIN — AMLODIPINE BESYLATE 5 MG: 5 TABLET ORAL at 15:03

## 2021-05-30 ASSESSMENT — PAIN DESCRIPTION - PROGRESSION

## 2021-05-30 ASSESSMENT — PAIN SCALES - GENERAL
PAINLEVEL_OUTOF10: 7
PAINLEVEL_OUTOF10: 8
PAINLEVEL_OUTOF10: 7
PAINLEVEL_OUTOF10: 6
PAINLEVEL_OUTOF10: 7

## 2021-05-30 ASSESSMENT — PAIN DESCRIPTION - LOCATION: LOCATION: BACK

## 2021-05-30 ASSESSMENT — PAIN - FUNCTIONAL ASSESSMENT: PAIN_FUNCTIONAL_ASSESSMENT: ACTIVITIES ARE NOT PREVENTED

## 2021-05-30 ASSESSMENT — PAIN DESCRIPTION - ORIENTATION: ORIENTATION: LOWER

## 2021-05-30 ASSESSMENT — PAIN DESCRIPTION - DESCRIPTORS: DESCRIPTORS: ACHING

## 2021-05-30 ASSESSMENT — PAIN DESCRIPTION - ONSET: ONSET: ON-GOING

## 2021-05-30 ASSESSMENT — PAIN DESCRIPTION - PAIN TYPE: TYPE: ACUTE PAIN

## 2021-05-30 ASSESSMENT — PAIN DESCRIPTION - FREQUENCY: FREQUENCY: CONTINUOUS

## 2021-05-30 NOTE — PROGRESS NOTES
[facility]  Office: [address], DO, Henry Char, DO, Sriram Vaughan, DO, Bethany Garcia Blood, DO, Morales Saavedra MD, Malik Garcia MD, Tono Campos MD, Nicole Lugo MD, Noah Blount MD, Pelon Jimenez MD, Torrey Worley MD, Lorin Hannon MD, Zeeshan Sal, DO, Vineet Salas MD, Aniya Middleton DO, Jalil Acevedo MD,  Jordan Stern, DO, Cabrera Quigley MD, aZfar Gandhi MD, Christine Pope MD, Maria Alejandra Velez MD, Андрей Gordillo, [facility], [facility], CNP, Analy Baldwin CNP, Cassandra Lopes, CNS, Case Grubbs, CNP, Vannie Epley, CNP, Eliane Ch, CNP, Socorro Gaming, CNP, Roxana Cruz, CNP, Maria Isabel Marie PA-C, Timur Denise, [facility], Sebastien Turcios, CNP, Kat Hauser, CNP, Stacey Jean, CNP, Delta Bone, CNP, Hero Stein, CNP, Jaime Hawkamp, [address] [place] [address]    Progress Note    5/30/2021    3:22 PM    Name:   Deniz Ortiz  MRN:     [id number]     Acct:      [de-identified]   Room:   [address] [place] [address] Day:  3  Admit Date:  5/27/2021  2:18 AM    PCP:   No primary care provider on file. Code Status:  Full Code    Subjective:     C/C: Back pain. Interval History Status: not changed. Pt seen and examined. No acute events overnight. Pt is tolerating her diet in no distress. No lower ext numbness or weakness. No fevers or chills. Cr did get worse today despite IV hydration. Vital signs have been stable. Brief History: This is a 67-year-old female who initially presented to the hospital with complaints of increasing back pain. Initial MRI did show burst L2 fracture with 6 mm retropulsion of the spinal canal with moderate canal narrowing along with lucency in the right side. Patient was transferred for neurosurgery evaluation. MRI was concerning for pathologic fracture.   IgA level 2250, kappa lambda ratio 0.01 and of work-up pending    Review of Systems:     Constitutional:  negative for chills, fevers, sweats  Respiratory:  negative for cough, dyspnea on exertion, shortness of breath, wheezing  Cardiovascular:  negative for chest pain, chest pressure/discomfort, lower extremity edema, palpitations  Gastrointestinal:  negative for abdominal pain, constipation, diarrhea, nausea, vomiting  Neurological:  negative for dizziness, headache admits to back pain that's stable. Medications: Allergies:  No Known Allergies    Current Meds:   Scheduled Meds:    amLODIPine  5 mg Oral Daily    docusate sodium  100 mg Oral Daily    senna  1 tablet Oral Nightly    sodium chloride flush  5-40 mL Intravenous 2 times per day    heparin (porcine)  5,000 Units Subcutaneous 3 times per day    latanoprost  1 drop Both Eyes Nightly    levothyroxine  50 mcg Oral Daily    atorvastatin  20 mg Oral Daily     Continuous Infusions:    sodium chloride 100 mL/hr at 21 1530    sodium chloride       PRN Meds: HYDROcodone 5 mg - acetaminophen **OR** HYDROcodone 5 mg - acetaminophen, sodium chloride flush, sodium chloride, nicotine, acetaminophen **OR** acetaminophen, sodium chloride flush    Data:     Past Medical History:   has a past medical history of Acute renal failure (ARF) ([facility] [address] Compression fracture of lumbar vertebra ([facility] [address] Essential hypertension, Other hyperlipidemia, and Other specified hypothyroidism. Social History:   reports that she has never smoked. She has never used smokeless tobacco. She reports previous alcohol use. She reports that she does not use drugs.      Family History:   Family History   Problem Relation Age of Onset    No Known Problems Mother     No Known Problems Father        Vitals:  BP (!) 175/78   Pulse 84   Temp 98.3 °F (36.8 °C)   Resp 16   Ht 5' (1.524 m)   Wt 94 lb 9.2 oz (42.9 kg)   SpO2 92%   BMI 18.47 kg/m²   Temp (24hrs), Av.1 °F (36.7 °C), Min:97.9 °F (36.6 °C), Max:98.3 °F (36.8 °C)    Recent Labs     21  0736 212   POCGLU 82 96 I/O (24Hr): Intake/Output Summary (Last 24 hours) at 5/30/2021 1522  Last data filed at 5/29/2021 1758  Gross per 24 hour   Intake 1924 ml   Output 1300 ml   Net 624 ml       Labs:  Hematology:  Recent Labs     05/28/21  0541 05/29/21  0647   WBC 7.6 8.0   RBC 3.08* 2.86*   HGB 8.7* 8.1*   HCT 29.5* 27.5*   MCV 95.8 96.2   MCH 28.2 28.3   MCHC 29.5 29.5   RDW 13.3 13.4    232   MPV 9.4 9.7     Chemistry:  Recent Labs     05/28/21  0541 05/29/21  0647 05/30/21  1124    136 139   K 3.9 3.7 3.6*    105 103   CO2 23 22 25   GLUCOSE 83 82 118*   BUN 25* 25* 25*   CREATININE 1.61* 1.18* 1.33*   ANIONGAP 10 9 11   LABGLOM 31* 45* 39*   GFRAA 38* 54* 47*   CALCIUM 8.5* 8.6 8.7     Recent Labs     05/28/21  0541 05/28/21  0736 05/28/21  2112 05/29/21  0647   PROT 6.8  --   --  6.6   LABALBU 3.0*  --   --  3.0*   AST 15  --   --  15   ALT 9  --   --  9   ALKPHOS 47  --   --  43   BILITOT 0.20*  --   --  0.15*   POCGLU  --  82 96  --      ABG:No results found for: POCPH, PHART, PH, POCPCO2, IMR9UQS, PCO2, POCPO2, PO2ART, PO2, POCHCO3, YFE0GWT, HCO3, NBEA, PBEA, BEART, BE, THGBART, THB, QKR5BTQ, ACFN1RGB, A8NQHADD, O2SAT, FIO2  No results found for: SPECIAL  No results found for: CULTURE    Radiology:  MRI CERVICAL SPINE W WO CONTRAST    Result Date: 5/27/2021  No evidence of metastatic disease inside the spinal canal. C2 and C3 vertebral body demonstrate fat marrow signal and remainder of the cervical vertebral bodies have diffuse T1 and T2 hypointensity. C2 and C3 may have been exposed to prior radiation. The appearance of the remainder of vertebral bodies may be related to anemia or other diffuse marrow abnormalities. Anterior aspect of C4 has enhancing lesion. Finding may be related to atypical hemangioma or marrow infiltration. At C5-C6, moderate canal stenosis and severe bilateral neural foraminal narrowing.   Left foraminal disc protrusion/osteophytic ridging impinges on the exiting left C6 nerve root. Other mild to moderate degenerative changes of cervical spine as described. MRI THORACIC SPINE W WO CONTRAST    Addendum Date: 5/27/2021    ADDENDUM: Critical results were called by Dr. Brandon Garcia MD to [facility] on 5/27/2021 at 19:26. The reported infiltrative mass lesion within the upper lumbar spine may also be related to hematoma . Result Date: 5/27/2021  Discrete enhancing lesions involving the spinous processes of the vertebral bodies of T4, T6 and T7 and anterior aspect of vertebral body of T4 concerning for metastatic disease. Diffuse T1 and T2 hypointensity of the marrow which may be effect of anemia or chemotherapy. Only partially included on the acquired images there is a diffuse infiltrative mass lesion surrounding the anterior and lateral aspect of L1 vertebral body. Clinical correlation and if appropriate contrast enhanced CT of abdomen and pelvis is recommended. The findings were sent to the Radiology Results [address] at 7:10 pm on 5/27/2021to be communicated to a licensed caregiver. US RENAL COMPLETE    Result Date: 5/27/2021  Diffuse increased renal parenchymal echogenicity bilaterally consistent with medical renal disease. No hydronephrosis in either kidney.        Physical Examination:        General appearance:  alert, cooperative and no distress  Mental Status:  oriented to person, place and time and normal affect  Lungs:  clear to auscultation bilaterally, normal effort  Heart:  regular rate and rhythm, no murmur  Abdomen:  soft, nontender, nondistended, normal bowel sounds, no masses, hepatomegaly, splenomegaly  Extremities:  no edema, redness, tenderness in the calves  Skin:  no gross lesions, rashes, induration    Assessment:        Hospital Problems         Last Modified POA    * (Principal) Compression fracture of lumbar vertebra ([address] 5/27/2021 Yes    Acute renal failure (ARF) ([address] 5/27/2021 Yes    Essential hypertension 5/27/2021 Yes    Other hyperlipidemia 5/27/2021 Yes    Subclinical hypothyroidism 5/27/2021 Yes    Pathologic lumbar vertebral fracture 5/29/2021 Yes    Normocytic normochromic anemia 5/27/2021 Yes    Multiple myeloma not having achieved remission ([facility] [address] 5/27/2021 Yes    Moderate protein-calorie malnutrition ([facility] [address] 5/28/2021 Yes    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 Pathologic fracture of lumbar Vertebra: Neurosurgery consulted, plan for OR early next week. Continue PT/OT. TLSO brace ordered. Hold ASA,  2. Workup for MM in progress. Kappa/Lambda 0.97/136. Immunoglobulin panel: IGA: 2250. Oncology consulted, need tissue diagnosis. 3. Protein calorie malnutrition: liberalize diet, High calorie supplement. 4. Nephrotic range proteinuria: Add norvasc 5 mg . Add ACE/ARB when AMERICO resolves  5. Monitor Hgb, transfuse prn for symptomatic anemia or Hgb <7  6. Continue Lipitor 20 mg daily. 7. Continue DVT ppx.  8. Continue Synthroid 50 mcg daily. 9. Risk stratification: patient is low/intermediate risk for surgery.        Lulu Bains DO  5/30/2021  3:22 PM \

## 2021-05-30 NOTE — PROGRESS NOTES
Today's Date: 5/30/2021  Patient Name: Kirk Kaur  Date of admission: 5/27/2021  2:18 AM  Patient's age: 77 y. o., 1945  Admission Dx: Acute renal failure (ARF) ([facility] [address] [N17.9]    Reason for Consult: management recommendations  Requesting Physician: Lulu Bains DO    CHIEF COMPLAINT: Compression fracture of lumbar vertebra    Subjective:    No acute events overnight. Plan for neurosurgical intervention likely on 6/3/2021  Hypertensive 178/90  Hemoglobin 8.1      Serum IFX Interp 05/27/2021  2:12  [address]   A ZONE OF RESTRICTION IS PRESENT IN THE BETA GLOBULIN REGION.  CONFIRMED BY IMMUNOFIXATION TO BE MONOCLONAL   Comment:   IgA LAMBDA (1.31 G/DL). FREE MONOCLONAL LIGHT CHAINS ARE PRESENT, IDENTIFIED AS      MRI thoracic spine:  Discrete enhancing lesions involving the spinous processes of the vertebral   bodies of T4, T6 and T7 and anterior aspect of vertebral body of T4   concerning for metastatic disease.       Diffuse T1 and T2 hypointensity of the marrow which may be effect of anemia   or chemotherapy.       Only partially included on the acquired images there is a diffuse   infiltrative mass lesion surrounding the anterior and lateral aspect of L1   vertebral body.  Clinical correlation and if appropriate contrast enhanced CT   of abdomen and pelvis is recommended. MRI cervical spine  Anterior aspect of C4 has enhancing lesion.  Finding may be related to   atypical hemangioma or marrow infiltration.       At C5-C6, moderate canal stenosis and severe bilateral neural foraminal   narrowing.  Left foraminal disc protrusion/osteophytic ridging impinges on   the exiting left C6 nerve root         HISTORY OF PRESENT ILLNESS:      The patient is a 77 y.o. female with no significant past medical history, [facility], was found to have UTI with right ureteral stone and was treated with Bactrim Mallory Mom, DO   100 mg at 05/30/21 0755    HYDROcodone-acetaminophen (NORCO) 5-325 MG per tablet 1 tablet  1 tablet Oral Q4H PRN Eura Everts, APRN - CNP   1 tablet at 05/30/21 1115    Or    HYDROcodone-acetaminophen (NORCO) 5-325 MG per tablet 2 tablet  2 tablet Oral Q4H PRN Eura Everts, APRN - CNP   2 tablet at 05/30/21 6163    senna (SENOKOT) tablet 8.6 mg  1 tablet Oral Nightly Eura Everts, APRN - CNP   8.6 mg at 05/29/21 2014    0.9 % sodium chloride infusion   Intravenous Continuous Mohammad DALTON Fisher,  mL/hr at 05/28/21 1530 Rate Change at 05/28/21 1530    sodium chloride flush 0.9 % injection 5-40 mL  5-40 mL Intravenous 2 times per day Gabriella Tang MD   10 mL at 05/29/21 2015    sodium chloride flush 0.9 % injection 5-40 mL  5-40 mL Intravenous PRN Gabriella Tang MD        0.9 % sodium chloride infusion  25 mL Intravenous PRN Gabriella Tang MD        nicotine (NICODERM CQ) 21 MG/24HR 1 patch  1 patch Transdermal Daily PRN Gabriella Tang MD        acetaminophen (TYLENOL) tablet 650 mg  650 mg Oral Q6H PRN Gabriella Tang MD   650 mg at 05/27/21 2147    Or    acetaminophen (TYLENOL) suppository 650 mg  650 mg Rectal Q6H PRN Gabriella Tang MD        heparin (porcine) injection 5,000 Units  5,000 Units Subcutaneous 3 times per day Gabriella Tang MD        latanoprost (XALATAN) 0.005 % ophthalmic solution 1 drop  1 drop Both Eyes Nightly Jason Nakia, APRN - NP   1 drop at 05/29/21 2015    levothyroxine (SYNTHROID) tablet 50 mcg  50 mcg Oral Daily Jason Izard, APRN - NP   50 mcg at 05/30/21 6987    atorvastatin (LIPITOR) tablet 20 mg  20 mg Oral Daily Jason Izard, APRN - NP   20 mg at 05/29/21 2014    sodium chloride flush 0.9 % injection 10 mL  10 mL Intravenous PRN Mallory Mom, DO           Allergies:  Patient has no known allergies. Social History:   reports that she has never smoked.  She has never used smokeless tobacco. She reports previous alcohol use. She reports that she does not use drugs. Family History: family history includes No Known Problems in her father and mother. REVIEW OF SYSTEMS:      Constitutional: No fever or chills. No night sweats, no weight loss   Eyes: No eye discharge, double vision, or eye pain   HEENT: negative for sore mouth, sore throat, hoarseness and voice change   Respiratory: negative for cough , sputum, dyspnea, wheezing, hemoptysis, chest pain   Cardiovascular: negative for chest pain, dyspnea, palpitations, orthopnea, PND   Gastrointestinal: negative for nausea, vomiting, diarrhea, constipation, abdominal pain, Dysphagia, hematemesis and hematochezia   Genitourinary: negative for frequency, dysuria, nocturia, urinary incontinence, and hematuria   Integument: negative for rash, skin lesions, bruises.    Hematologic/Lymphatic: negative for easy bruising, bleeding, lymphadenopathy, or petechiae   Endocrine: negative for heat or cold intolerance,weight changes, change in bowel habits and hair loss   Musculoskeletal: negative for myalgias, arthralgias, pain, joint swelling,and bone pain   Neurological: negative for headaches, dizziness, seizures, weakness, numbness    PHYSICAL EXAM:        BP (!) 178/90   Pulse 80   Temp 97.9 °F (36.6 °C)   Resp 18   Ht 5' (1.524 m)   Wt 94 lb 9.2 oz (42.9 kg)   SpO2 96%   BMI 18.47 kg/m²    Temp (24hrs), Av.9 °F (36.6 °C), Min:97.9 °F (36.6 °C), Max:97.9 °F (36.6 °C)      General appearance - well appearing, no in pain or distress   Mental status - alert and cooperative   Eyes - pupils equal and reactive, extraocular eye movements intact   Ears - bilateral TM's and external ear canals normal   Mouth - mucous membranes moist, pharynx normal without lesions   Neck - supple, no significant adenopathy   Lymphatics - no palpable lymphadenopathy, no hepatosplenomegaly   Chest - clear to auscultation, no wheezes, rales or rhonchi, symmetric air entry   Heart - normal rate, regular rhythm, normal S1, S2, no murmurs  Abdomen - soft, nontender, nondistended, no masses or organomegaly   Neurological - alert, oriented, normal speech, no focal findings or movement disorder noted   Musculoskeletal - no joint tenderness, deformity or swelling   Extremities - peripheral pulses normal, no pedal edema, no clubbing or cyanosis   Skin - normal coloration and turgor, no rashes, no suspicious skin lesions noted ,      DATA:      Labs:     Results for orders placed or performed during the hospital encounter of 05/27/21   Urinalysis with microscopic   Result Value Ref Range    Color, UA ORANGE (A) YELLOW    Turbidity UA CLEAR CLEAR    Glucose, Ur NEGATIVE NEGATIVE    Bilirubin Urine NEGATIVE NEGATIVE    Ketones, Urine NEGATIVE NEGATIVE    Specific Gravity, UA 1.014 1.005 - 1.030    Urine Hgb LARGE (A) NEGATIVE    pH, UA 7.0 5.0 - 8.0    Protein, UA 2+ (A) NEGATIVE    Urobilinogen, Urine Normal Normal    Nitrite, Urine NEGATIVE NEGATIVE    Leukocyte Esterase, Urine TRACE (A) NEGATIVE    -          WBC, UA 10 TO 20 0 - 5 /HPF    RBC, UA TOO NUMEROUS TO COUNT 0 - 4 /HPF    Casts UA  0 - 8 /LPF     2 TO 5 HYALINE Reference range defined for non-centrifuged specimen. Crystals, UA NOT REPORTED None /HPF    Epithelial Cells UA 0 TO 2 0 - 5 /HPF    Renal Epithelial, UA NOT REPORTED 0 /HPF    Bacteria, UA NOT REPORTED None    Mucus, UA NOT REPORTED None    Trichomonas, UA NOT REPORTED None    Amorphous, UA NOT REPORTED None    Other Observations UA NOT REPORTED NOT REQ.     Yeast, UA NOT REPORTED None   Sodium, urine, random   Result Value Ref Range    Sodium,Ur 110 mmol/L   Protein, urine, random   Result Value Ref Range    Total Protein, Urine 354 mg/dL   Osmolality, urine   Result Value Ref Range    Osmolality, Ur 421 80 - 1300 mOsm/kg   Creatinine, urine, random   Result Value Ref Range    Creatinine, Ur 54.7 28.0 - 217.0 mg/dL   CBC   Result Value Ref Range    WBC 7.5 3.5 - 11.3 k/uL    RBC 3.29 (L) 3.95 - 5.11 g/dL    Albumin (calculated) 3.3 3.2 - 5.2 g/dL    Albumin % 49 45 - 65 %    Alpha-1-Globulin 0.2 0.1 - 0.4 g/dL    Alpha 1 % 3 3 - 6 %    Alpha-2-Globulin 0.9 0.5 - 0.9 g/dL    Alpha 2 % 13 6 - 13 %    Beta Globulin 2.1 (H) 0.5 - 1.1 g/dL    Beta Percent 31 (H) 11 - 19 %    Gamma Globulin 0.3 (L) 0.5 - 1.5 g/dL    Gamma Globulin % 4 (L) 9 - 20 %    Total Prot. Sum 6.8 6.3 - 8.2 g/dL    Total Prot. Sum,% 100 98 - 102 %    Protein Electrophoresis, Serum       A ZONE OF RESTRICTION IS PRESENT IN THE BETA GLOBULIN REGION. CONFIRMED BY IMMUNOFIXATION TO BE MONOCLONAL    Pathologist ELECTRONICALLY SIGNED. Lucia Callaway M.D. IMMUNOFIXATION SERUM PROFILE   Result Value Ref Range    Serum IFX Interp       A ZONE OF RESTRICTION IS PRESENT IN THE BETA GLOBULIN REGION. CONFIRMED BY IMMUNOFIXATION TO BE MONOCLONAL    Pathologist ELECTRONICALLY SIGNED. Lucia Callaway M.D.    KAPPA/LAMBDA QUANT FREE LIGHT CHAINS SERUM   Result Value Ref Range    Kappa Free Light Chains QNT 0.97 0.37 - 1.94 mg/dL    Lambda Free Light Chains .34 (H) 0.57 - 2.63 mg/dL    Free Kappa/Lambda Ratio 0.01 (L) 0.26 - 1.65   IMMUNOGLOBULINS PANEL   Result Value Ref Range    IgG <300 (L) 700 - 1600 mg/dL    IgA 2250 (H) 70 - 400 mg/dL    IgM <25 (L) 40 - 230 mg/dL   T4, Free   Result Value Ref Range    Thyroxine, Free 1.35 0.93 - 1.70 ng/dL   PROTEIN ELECTROPHORESIS, URINE   Result Value Ref Range    Specimen Type . URINE     Urine Total Protein 180 mg/dL    P E Interpretation, U PENDING     Pathologist PENDING    CBC   Result Value Ref Range    WBC 7.6 3.5 - 11.3 k/uL    RBC 3.08 (L) 3.95 - 5.11 m/uL    Hemoglobin 8.7 (L) 11.9 - 15.1 g/dL    Hematocrit 29.5 (L) 36.3 - 47.1 %    MCV 95.8 82.6 - 102.9 fL    MCH 28.2 25.2 - 33.5 pg    MCHC 29.5 28.4 - 34.8 g/dL    RDW 13.3 11.8 - 14.4 %    Platelets 234 173 - 985 k/uL    MPV 9.4 8.1 - 13.5 fL    NRBC Automated 0.0 0.0 per 100 WBC   Comprehensive Metabolic Panel w/ Reflex to MG   Result Value Ref Range    Glucose 83 70 - 99 mg/dL    BUN 25 (H) 8 - 23 mg/dL    CREATININE 1.61 (H) 0.50 - 0.90 mg/dL    Bun/Cre Ratio NOT REPORTED 9 - 20    Calcium 8.5 (L) 8.6 - 10.4 mg/dL    Sodium 140 135 - 144 mmol/L    Potassium 3.9 3.7 - 5.3 mmol/L    Chloride 107 98 - 107 mmol/L    CO2 23 20 - 31 mmol/L    Anion Gap 10 9 - 17 mmol/L    Alkaline Phosphatase 47 35 - 104 U/L    ALT 9 5 - 33 U/L    AST 15 <32 U/L    Total Bilirubin 0.20 (L) 0.3 - 1.2 mg/dL    Total Protein 6.8 6.4 - 8.3 g/dL    Albumin 3.0 (L) 3.5 - 5.2 g/dL    Albumin/Globulin Ratio 0.8 (L) 1.0 - 2.5    GFR Non- 31 (L) >60 mL/min    GFR  38 (L) >60 mL/min    GFR Comment          GFR Staging NOT REPORTED    BETA 2 MICROGLOBULIN, SERUM   Result Value Ref Range    Beta-2 Microglobulin 6.9 (H) 0.6 - 2.4 mg/L   Protein / Creatinine Ratio, Urine   Result Value Ref Range    Total Protein, Urine 180 mg/dL    Creatinine, Ur 30.5 28.0 - 217.0 mg/dL    Urine Total Protein Creatinine Ratio 5.90 (H) 0.00 - 0.20   URINALYSIS WITH MICROSCOPIC   Result Value Ref Range    Color, UA YELLOW YELLOW    Turbidity UA CLEAR CLEAR    Glucose, Ur NEGATIVE NEGATIVE    Bilirubin Urine NEGATIVE NEGATIVE    Ketones, Urine NEGATIVE NEGATIVE    Specific Gravity, UA 1.010 1.005 - 1.030    Urine Hgb LARGE (A) NEGATIVE    pH, UA 6.0 5.0 - 8.0    Protein, UA 2+ (A) NEGATIVE    Urobilinogen, Urine Normal Normal    Nitrite, Urine NEGATIVE NEGATIVE    Leukocyte Esterase, Urine NEGATIVE NEGATIVE    -          WBC, UA 5 TO 10 0 - 5 /HPF    RBC, UA TOO NUMEROUS TO COUNT 0 - 4 /HPF    Casts UA  0 - 8 /LPF     2 TO 5 HYALINE Reference range defined for non-centrifuged specimen.     Crystals, UA NOT REPORTED None /HPF    Epithelial Cells UA 0 TO 2 0 - 5 /HPF    Renal Epithelial, UA NOT REPORTED 0 /HPF    Bacteria, UA NOT REPORTED None    Mucus, UA NOT REPORTED None    Trichomonas, UA NOT REPORTED None    Amorphous, UA NOT REPORTED None    Other Observations UA NOT REPORTED NOT REQ.     Yeast, UA NOT REPORTED None   CBC   Result Value Ref Range    WBC 8.0 3.5 - 11.3 k/uL    RBC 2.86 (L) 3.95 - 5.11 m/uL    Hemoglobin 8.1 (L) 11.9 - 15.1 g/dL    Hematocrit 27.5 (L) 36.3 - 47.1 %    MCV 96.2 82.6 - 102.9 fL    MCH 28.3 25.2 - 33.5 pg    MCHC 29.5 28.4 - 34.8 g/dL    RDW 13.4 11.8 - 14.4 %    Platelets 107 511 - 182 k/uL    MPV 9.7 8.1 - 13.5 fL    NRBC Automated 0.0 0.0 per 100 WBC   Comprehensive Metabolic Panel w/ Reflex to MG   Result Value Ref Range    Glucose 82 70 - 99 mg/dL    BUN 25 (H) 8 - 23 mg/dL    CREATININE 1.18 (H) 0.50 - 0.90 mg/dL    Bun/Cre Ratio NOT REPORTED 9 - 20    Calcium 8.6 8.6 - 10.4 mg/dL    Sodium 136 135 - 144 mmol/L    Potassium 3.7 3.7 - 5.3 mmol/L    Chloride 105 98 - 107 mmol/L    CO2 22 20 - 31 mmol/L    Anion Gap 9 9 - 17 mmol/L    Alkaline Phosphatase 43 35 - 104 U/L    ALT 9 5 - 33 U/L    AST 15 <32 U/L    Total Bilirubin 0.15 (L) 0.3 - 1.2 mg/dL    Total Protein 6.6 6.4 - 8.3 g/dL    Albumin 3.0 (L) 3.5 - 5.2 g/dL    Albumin/Globulin Ratio 0.8 (L) 1.0 - 2.5    GFR Non- 45 (L) >60 mL/min    GFR  54 (L) >60 mL/min    GFR Comment          GFR Staging NOT REPORTED    Protein, urine, random   Result Value Ref Range    Total Protein, Urine 238 mg/dL   Creatinine, Random Urine   Result Value Ref Range    Creatinine, Ur 33.0 28.0 - 217.0 mg/dL   POC Glucose Fingerstick   Result Value Ref Range    POC Glucose 82 65 - 105 mg/dL   POC Glucose Fingerstick   Result Value Ref Range    POC Glucose 96 65 - 105 mg/dL   TYPE AND SCREEN   Result Value Ref Range    Expiration Date 05/31/2021,2359     Arm Band Number [id number]     ABO/Rh A POSITIVE     Antibody Screen NEGATIVE    BLOOD BANK SPECIMEN   Result Value Ref Range    Blood Bank Specimen NOT REPORTED          IMAGING DATA:    US RENAL COMPLETE    Result Date: 5/27/2021  EXAMINATION: RETROPERITONEAL ULTRASOUND OF THE KIDNEYS AND URINARY BLADDER 5/27/2021 COMPARISON: None HISTORY: ORDERING SYSTEM PROVIDED HISTORY: ARF TECHNOLOGIST PROVIDED HISTORY: US RETROPERITONEAL COMPLETE ARF FINDINGS: Kidneys: The right kidney measures 10.0 x 4.0 x 3.5 cm and the left kidney measures 10.5 x 3.3 x 5.2 cm. Cortical thickness within normal limits bilaterally. There is diffuse increased renal parenchymal echogenicity bilaterally with increased prominence of the relative hypoechoic renal pyramids. A tiny 7 x 5 x 6 mm right lower pole renal cyst is noted. No other focal renal lesion. No hydronephrosis in either kidney. No sonographic evidence for renal calculi. Bladder: Unremarkable appearance of the bladder. No dilated distal ureters. The patient did not have the urge to void. Bilateral ureteral jets were identified. Diffuse increased renal parenchymal echogenicity bilaterally consistent with medical renal disease. No hydronephrosis in either kidney. 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 Pathological fracture of L2 ? Differentials could be benign fracture, primary bone tumor or metastatic cancer with unknown primary at this point. Will need more work-up. 2. [place]  3. 6 mm retropulsion of spinal canal with moderate canal narrowing    RECOMMENDATIONS:    1. No changes clinically.   Neurosurgery planning for surgical resection of spine lesion on 6/3/2021 tentatively. 2. IgA lambda multiple myeloma. 3. Further treatment for myeloma will be after the surgical resection and recovery. She will benefit from local radiation therapy followed by systemic treatment with targeted agents. All explained to the patient and the family. 4. We will do a bone marrow test down the line. However I do not do with now to avoid back complications due to positioning during the test.    Ted Romero MD  PGY-2 Internal Medicine Resident  [address] [place] [address]  5/30/2021 11:29 AM                              Attending Physician Statement   I have discussed the care of Edwin Carvajal, including pertinent history and exam findings with the resident. I have reviewed the key elements of all parts of the encounter with the resident. I have seen and examined the patient with the resident. I agree with the assessment and plan and status of the problem list as documented.                                [facility]

## 2021-05-30 NOTE — PLAN OF CARE
Problem: Skin Integrity:  Goal: Will show no infection signs and symptoms  Description: Will show no infection signs and symptoms  5/30/2021 0440 by Luis Gooden RN  Outcome: Ongoing     Problem: Skin Integrity:  Goal: Absence of new skin breakdown  Description: Absence of new skin breakdown  5/30/2021 0440 by Luis Gooden RN  Outcome: Ongoing     Problem: Falls - Risk of:  Goal: Will remain free from falls  Description: Will remain free from falls  5/30/2021 0440 by Luis Gooden RN  Outcome: Ongoing     Problem: Falls - Risk of:  Goal: Absence of physical injury  Description: Absence of physical injury  5/30/2021 0440 by Luis Gooden RN  Outcome: Ongoing     Problem: Pain:  Goal: Pain level will decrease  Description: Pain level will decrease  5/30/2021 0440 by Luis Gooden RN  Outcome: Ongoing     Problem: Pain:  Goal: Control of acute pain  Description: Control of acute pain  5/30/2021 0440 by Luis Gooden RN  Outcome: Ongoing     Problem: Pain:  Goal: Control of chronic pain  Description: Control of chronic pain  5/30/2021 0440 by Luis Gooden RN  Outcome: Ongoing     Problem: Nutrition  Goal: Optimal nutrition therapy  5/30/2021 0440 by Luis Gooden RN  Outcome: Ongoing

## 2021-05-30 NOTE — PROGRESS NOTES
Neurosurgery Resident  Daily Progress Note    5/30/2021  6:28 AM    Chart reviewed. No acute events overnight. No new complaints. No chest pain, SOB, abdominal pain. Pain is well controlled with current analgesic regimen. Patient was also afebrile. Vitals:    05/28/21 0750 05/28/21 2046 05/29/21 0510 05/29/21 0843   BP: (!) 147/82 (!) 143/85  (!) 178/82   Pulse: 67 74  88   Resp: 16 16  16   Temp: 97.9 °F (36.6 °C) 97.6 °F (36.4 °C)  97.8 °F (36.6 °C)   TempSrc: Oral Oral     SpO2: 94% 92%  96%   Weight:   99 lb 10.4 oz (45.2 kg)    Height:           PE:   Gen: AOx3, NAD, lying comfortably supine in bed  HENT: PERRL, EOMI   Resp: equal air entry bilaterally with no audible wheezing or crackles  Abd: soft, no rigidity, no guarding, ND/NT  Neuro:  Sensation intact to light touch BLE. L psoas 5/5, R psoas 5/5  L quads 5/5, R quads 5/5  L ankle DF 5/5, R ankle DF 5/5  L ankle PF 5/5, R ankle PF 5/5      Lab Results   Component Value Date    WBC 8.0 05/29/2021    HGB 8.1 (L) 05/29/2021    HCT 27.5 (L) 05/29/2021     05/29/2021    ALT 9 05/29/2021    AST 15 05/29/2021     05/29/2021    K 3.7 05/29/2021     05/29/2021    CREATININE 1.18 (H) 05/29/2021    BUN 25 (H) 05/29/2021    CO2 22 05/29/2021    TSH 7.01 (H) 05/27/2021    INR 1.2 05/27/2021       77 y.o. female who presents with pathological fracture L2 with canal compression, presumed myeloma                  - Plan for neurosurgical intervention next week, likely 6/3/21.              - TLSO brace when out of bed.   - CT lumbar spine for surgical planning.              - Appreciate surgical clearance/risk stratification.  - Hem-Onc following. Patient care will be discussed with attending, will reevaluate patient along with attending. Please contact neurosurgery with any changes in patients neurologic status.       Aidan Powell, DO 6:28 AM

## 2021-05-30 NOTE — PROGRESS NOTES
Renal Progress Note    Patient :  Shayna Tavares; 77 y.o. MRN# [id number]  Location:  [facility]-  Attending:  Devon Fairchild DO  Admit Date:  5/27/2021   Hospital Day: 3      Subjective: Following for AMERICO  With peak creatinine 1.6. Baseline normal.  Admitted with compression fracture. No labs today. IVF discontinued  Urine output 1300 last 24 hours  Hematology following for new diagnosis of multiple myeloma. -180. Not on any antihypertensives. Lisinopril discontinued due to AMERICO. Patient denies distress. Family at bedside. Outpatient Medications:     Medications Prior to Admission: naproxen (NAPROSYN) 500 MG tablet, Take 500 mg by mouth 2 times daily (with meals)  latanoprost (XALATAN) 0.005 % ophthalmic solution, Place 1 drop into both eyes nightly  levothyroxine (SYNTHROID) 50 MCG tablet, Take 50 mcg by mouth Daily  lisinopril (PRINIVIL;ZESTRIL) 20 MG tablet, Take 20 mg by mouth daily  simvastatin (ZOCOR) 20 MG tablet, Take 20 mg by mouth nightly  amLODIPine (NORVASC) 5 MG tablet, Take 5 mg by mouth daily  aspirin 81 MG chewable tablet, Take 81 mg by mouth daily    Current Medications:     Scheduled Meds:    docusate sodium  100 mg Oral Daily    senna  1 tablet Oral Nightly    sodium chloride flush  5-40 mL Intravenous 2 times per day    heparin (porcine)  5,000 Units Subcutaneous 3 times per day    latanoprost  1 drop Both Eyes Nightly    levothyroxine  50 mcg Oral Daily    atorvastatin  20 mg Oral Daily     Continuous Infusions:    sodium chloride 100 mL/hr at 05/28/21 1530    sodium chloride       PRN Meds:  HYDROcodone 5 mg - acetaminophen **OR** HYDROcodone 5 mg - acetaminophen, sodium chloride flush, sodium chloride, nicotine, acetaminophen **OR** acetaminophen, sodium chloride flush    Input/Output:       I/O last 3 completed shifts: In: 1924 [P.O.:800; I.V.:1124]  Out: 1300 [Urine:1300].       Patient Vitals for the past 96 hrs (Last 3 readings):   Weight 21 0600 94 lb 9.2 oz (42.9 kg)   21 0510 99 lb 10.4 oz (45.2 kg)   21 0600 91 lb 4.3 oz (41.4 kg)       Vital Signs:   Temperature:  Temp: 97.9 °F (36.6 °C)  TMax:   Temp (24hrs), Av.9 °F (36.6 °C), Min:97.9 °F (36.6 °C), Max:97.9 °F (36.6 °C)    Respirations:  Resp: 18  Pulse:   Pulse: 80  BP:    BP: (!) 178/90  BP Range: Systolic (01QYW), KMI:361 , Min:178 , MSR:506       Diastolic (03QJA), TMT:38, Min:90, Max:90      Physical Examination:     General:  AAO x 3, speaking in full sentences, no accessory muscle use. HEENT: Atraumatic, normocephalic, no throat congestion, moist mucosa. Eyes:   Pupils equal, round and reactive to light, EOMI. Neck:   No JVD, no thyromegaly, no lymphadenopathy. Chest:              Bilateral vesicular breath sounds, no rales or wheezes. Cardiac:  S1 S2 RR, no murmurs, gallops or rubs, JVP not raised. Abdomen: Soft, non-tender, no masses or organomegaly, BS audible. :   No suprapubic or flank tenderness. Neuro:  AAO x 3, No FND. SKIN:  No rashes, good skin turgor. Extremities:  No edema, palpable peripheral pulses, no calf tenderness.     Labs:       Recent Labs     21  0541 21  0647   WBC 7.6 8.0   RBC 3.08* 2.86*   HGB 8.7* 8.1*   HCT 29.5* 27.5*   MCV 95.8 96.2   MCH 28.2 28.3   MCHC 29.5 29.5   RDW 13.3 13.4    232   MPV 9.4 9.7      BMP:   Recent Labs     21  0541 21  0647    136   K 3.9 3.7    105   CO2 23 22   BUN 25* 25*   CREATININE 1.61* 1.18*   GLUCOSE 83 82   CALCIUM 8.5* 8.6   Albumin:    Recent Labs     21  0541 21  0647   LABALBU 3.0* 3.0*     SPEP:  Lab Results   Component Value Date    PROT 6.6 2021    ALBCAL 3.3 2021    ALBPCT 49 2021    LABALPH 0.2 2021    LABALPH 0.9 2021    A1PCT 3 2021    A2PCT 13 2021    LABBETA 2.1 2021    BETAPCT 31 2021    GAMGLOB 0.3 2021    GGPCT 4 2021    PATH PENDING 2021     UPEP: Lab Results   Component Value Date    LABPE PENDING 05/29/2021         Urinalysis/Chemistries:      Lab Results   Component Value Date    NITRU NEGATIVE 05/29/2021    COLORU YELLOW 05/29/2021    PHUR 6.0 05/29/2021    WBCUA 5 TO 10 05/29/2021    RBCUA TOO NUMEROUS TO COUNT 05/29/2021    MUCUS NOT REPORTED 05/29/2021    TRICHOMONAS NOT REPORTED 05/29/2021    YEAST NOT REPORTED 05/29/2021    BACTERIA NOT REPORTED 05/29/2021    SPECGRAV 1.010 05/29/2021    LEUKOCYTESUR NEGATIVE 05/29/2021    UROBILINOGEN Normal 05/29/2021    BILIRUBINUR NEGATIVE 05/29/2021    GLUCOSEU NEGATIVE 05/29/2021    KETUA NEGATIVE 05/29/2021    AMORPHOUS NOT REPORTED 05/29/2021     Urine Sodium:     Lab Results   Component Value Date     05/27/2021     Urine Osmolarity:   Lab Results   Component Value Date    OSMOU 421 05/27/2021       Urine Creatinine:     Lab Results   Component Value Date    LABCREA 33.0 05/29/2021       Radiology:     Renal US  Kidneys:       The right kidney measures 10.0 x 4.0 x 3.5 cm and the left kidney measures   10.5 x 3.3 x 5.2 cm.  Cortical thickness within normal limits bilaterally.       There is diffuse increased renal parenchymal echogenicity bilaterally with   increased prominence of the relative hypoechoic renal pyramids.  A tiny 7 x 5   x 6 mm right lower pole renal cyst is noted.  No other focal renal lesion.       No hydronephrosis in either kidney.  No sonographic evidence for renal   calculi.           Bladder:       Unremarkable appearance of the bladder.  No dilated distal ureters.  The   patient did not have the urge to void.  Bilateral ureteral jets were   identified.           Impression   Diffuse increased renal parenchymal echogenicity bilaterally consistent with   medical renal disease.       No hydronephrosis in either kidney. Assessment:     1.   Acute kidney injury secondary to prerenal injury from hypoperfusion related to poor p.o./nonsteroidal anti-inflammatory agent use and concomitant ACE inhibitor -improving. Creatinine peaked to 1.6  Baseline creatinine normal  She was also on Bactrim prior to hospitalization  2. Nephrotic range proteinuria likely from myeloma  3. L2 fracture with canal compression: Plans for surgery later this week  4. New diagnosis of multiple myeloma  5. Essential hypertension  6. Hypothyroidism    Plan:     Resume home dose of Norvasc  Will follow      Nutrition   Please ensure that patient is on a renal diet/TF. Avoid nephrotoxic drugs/contrast exposure. We will continue to follow along with you. Stacey Ask CNP    Attending Physician Statement  I have discussed the care of [facility], including pertinent history and exam findings,  with the CNP. I have reviewed the key elements of all parts of the encounter with the CNP. I agree with the assessment, plan and orders as documented.     Katherine Marquez MD MD, MRCP Jennifer Yancey, FACP   5/30/2021 3:42 PM    Nephrology [address] [place] [address]

## 2021-05-31 LAB
ANION GAP SERPL CALCULATED.3IONS-SCNC: 13 MMOL/L (ref 9–17)
BUN BLDV-MCNC: 24 MG/DL (ref 8–23)
BUN/CREAT BLD: ABNORMAL (ref 9–20)
CALCIUM SERPL-MCNC: 8.5 MG/DL (ref 8.6–10.4)
CHLORIDE BLD-SCNC: 105 MMOL/L (ref 98–107)
CO2: 21 MMOL/L (ref 20–31)
CREAT SERPL-MCNC: 1.27 MG/DL (ref 0.5–0.9)
GFR AFRICAN AMERICAN: 50 ML/MIN
GFR NON-AFRICAN AMERICAN: 41 ML/MIN
GFR SERPL CREATININE-BSD FRML MDRD: ABNORMAL ML/MIN/{1.73_M2}
GFR SERPL CREATININE-BSD FRML MDRD: ABNORMAL ML/MIN/{1.73_M2}
GLUCOSE BLD-MCNC: 98 MG/DL (ref 70–99)
POTASSIUM SERPL-SCNC: 3.5 MMOL/L (ref 3.7–5.3)
SODIUM BLD-SCNC: 139 MMOL/L (ref 135–144)

## 2021-05-31 PROCEDURE — 2580000003 HC RX 258: Performed by: INTERNAL MEDICINE

## 2021-05-31 PROCEDURE — 99232 SBSQ HOSP IP/OBS MODERATE 35: CPT | Performed by: INTERNAL MEDICINE

## 2021-05-31 PROCEDURE — 99231 SBSQ HOSP IP/OBS SF/LOW 25: CPT | Performed by: INTERNAL MEDICINE

## 2021-05-31 PROCEDURE — 6370000000 HC RX 637 (ALT 250 FOR IP): Performed by: INTERNAL MEDICINE

## 2021-05-31 PROCEDURE — 6370000000 HC RX 637 (ALT 250 FOR IP): Performed by: NURSE PRACTITIONER

## 2021-05-31 PROCEDURE — 94761 N-INVAS EAR/PLS OXIMETRY MLT: CPT

## 2021-05-31 PROCEDURE — 80048 BASIC METABOLIC PNL TOTAL CA: CPT

## 2021-05-31 PROCEDURE — 1200000000 HC SEMI PRIVATE

## 2021-05-31 PROCEDURE — 36415 COLL VENOUS BLD VENIPUNCTURE: CPT

## 2021-05-31 PROCEDURE — APPSS15 APP SPLIT SHARED TIME 0-15 MINUTES: Performed by: NURSE PRACTITIONER

## 2021-05-31 RX ORDER — AMLODIPINE BESYLATE 5 MG/1
5 TABLET ORAL ONCE
Status: COMPLETED | OUTPATIENT
Start: 2021-05-31 | End: 2021-05-31

## 2021-05-31 RX ORDER — MORPHINE SULFATE 2 MG/ML
2 INJECTION, SOLUTION INTRAMUSCULAR; INTRAVENOUS EVERY 4 HOURS PRN
Status: DISCONTINUED | OUTPATIENT
Start: 2021-05-31 | End: 2021-06-03

## 2021-05-31 RX ORDER — DOCUSATE SODIUM 100 MG/1
200 CAPSULE, LIQUID FILLED ORAL 2 TIMES DAILY
Status: DISCONTINUED | OUTPATIENT
Start: 2021-05-31 | End: 2021-06-10 | Stop reason: HOSPADM

## 2021-05-31 RX ORDER — AMLODIPINE BESYLATE 10 MG/1
10 TABLET ORAL DAILY
Status: DISCONTINUED | OUTPATIENT
Start: 2021-06-01 | End: 2021-06-10 | Stop reason: HOSPADM

## 2021-05-31 RX ORDER — POLYETHYLENE GLYCOL 3350 17 G/17G
17 POWDER, FOR SOLUTION ORAL DAILY
Status: DISCONTINUED | OUTPATIENT
Start: 2021-05-31 | End: 2021-06-10 | Stop reason: HOSPADM

## 2021-05-31 RX ORDER — OXYCODONE HYDROCHLORIDE AND ACETAMINOPHEN 5; 325 MG/1; MG/1
1 TABLET ORAL EVERY 4 HOURS PRN
Status: DISCONTINUED | OUTPATIENT
Start: 2021-05-31 | End: 2021-06-03

## 2021-05-31 RX ORDER — TRAMADOL HYDROCHLORIDE 50 MG/1
50 TABLET ORAL EVERY 6 HOURS PRN
Status: DISCONTINUED | OUTPATIENT
Start: 2021-05-31 | End: 2021-05-31

## 2021-05-31 RX ORDER — SENNA AND DOCUSATE SODIUM 50; 8.6 MG/1; MG/1
2 TABLET, FILM COATED ORAL DAILY
Status: DISCONTINUED | OUTPATIENT
Start: 2021-05-31 | End: 2021-06-10 | Stop reason: HOSPADM

## 2021-05-31 RX ORDER — TRAMADOL HYDROCHLORIDE 50 MG/1
50 TABLET ORAL EVERY 6 HOURS PRN
Status: DISCONTINUED | OUTPATIENT
Start: 2021-05-31 | End: 2021-06-03

## 2021-05-31 RX ORDER — OXYCODONE HYDROCHLORIDE AND ACETAMINOPHEN 5; 325 MG/1; MG/1
2 TABLET ORAL EVERY 4 HOURS PRN
Status: DISCONTINUED | OUTPATIENT
Start: 2021-05-31 | End: 2021-06-03

## 2021-05-31 RX ADMIN — AMLODIPINE BESYLATE 5 MG: 5 TABLET ORAL at 15:52

## 2021-05-31 RX ADMIN — DOCUSATE SODIUM 200 MG: 100 CAPSULE ORAL at 21:34

## 2021-05-31 RX ADMIN — HYDROCODONE BITARTRATE AND ACETAMINOPHEN 2 TABLET: 5; 325 TABLET ORAL at 15:52

## 2021-05-31 RX ADMIN — ATORVASTATIN CALCIUM 20 MG: 20 TABLET, FILM COATED ORAL at 21:34

## 2021-05-31 RX ADMIN — HYDROCODONE BITARTRATE AND ACETAMINOPHEN 2 TABLET: 5; 325 TABLET ORAL at 18:57

## 2021-05-31 RX ADMIN — LATANOPROST 1 DROP: 50 SOLUTION OPHTHALMIC at 21:34

## 2021-05-31 RX ADMIN — LEVOTHYROXINE SODIUM 50 MCG: 50 TABLET ORAL at 05:33

## 2021-05-31 RX ADMIN — DOCUSATE SODIUM 50MG AND SENNOSIDES 8.6MG 2 TABLET: 8.6; 5 TABLET, FILM COATED ORAL at 21:46

## 2021-05-31 RX ADMIN — POLYETHYLENE GLYCOL 3350 17 G: 17 POWDER, FOR SOLUTION ORAL at 15:53

## 2021-05-31 RX ADMIN — HYDROCODONE BITARTRATE AND ACETAMINOPHEN 2 TABLET: 5; 325 TABLET ORAL at 00:16

## 2021-05-31 RX ADMIN — SODIUM CHLORIDE: 9 INJECTION, SOLUTION INTRAVENOUS at 00:16

## 2021-05-31 RX ADMIN — SODIUM CHLORIDE, PRESERVATIVE FREE 10 ML: 5 INJECTION INTRAVENOUS at 21:35

## 2021-05-31 RX ADMIN — AMLODIPINE BESYLATE 5 MG: 5 TABLET ORAL at 08:57

## 2021-05-31 RX ADMIN — MORPHINE SULFATE 2 MG: 2 INJECTION, SOLUTION INTRAMUSCULAR; INTRAVENOUS at 21:34

## 2021-05-31 RX ADMIN — DOCUSATE SODIUM 100 MG: 100 CAPSULE ORAL at 08:57

## 2021-05-31 RX ADMIN — HYDROCODONE BITARTRATE AND ACETAMINOPHEN 2 TABLET: 5; 325 TABLET ORAL at 06:19

## 2021-05-31 RX ADMIN — HYDROCODONE BITARTRATE AND ACETAMINOPHEN 2 TABLET: 5; 325 TABLET ORAL at 10:56

## 2021-05-31 ASSESSMENT — PAIN DESCRIPTION - PROGRESSION
CLINICAL_PROGRESSION: NOT CHANGED

## 2021-05-31 ASSESSMENT — PAIN DESCRIPTION - ORIENTATION: ORIENTATION: LOWER

## 2021-05-31 ASSESSMENT — PAIN SCALES - GENERAL
PAINLEVEL_OUTOF10: 7
PAINLEVEL_OUTOF10: 7
PAINLEVEL_OUTOF10: 8
PAINLEVEL_OUTOF10: 7
PAINLEVEL_OUTOF10: 7
PAINLEVEL_OUTOF10: 5
PAINLEVEL_OUTOF10: 10
PAINLEVEL_OUTOF10: 4

## 2021-05-31 ASSESSMENT — PAIN DESCRIPTION - ONSET
ONSET: ON-GOING
ONSET: ON-GOING

## 2021-05-31 ASSESSMENT — PAIN DESCRIPTION - DESCRIPTORS
DESCRIPTORS: ACHING
DESCRIPTORS: ACHING

## 2021-05-31 ASSESSMENT — PAIN DESCRIPTION - FREQUENCY
FREQUENCY: CONTINUOUS
FREQUENCY: CONTINUOUS

## 2021-05-31 ASSESSMENT — PAIN DESCRIPTION - PAIN TYPE
TYPE: ACUTE PAIN
TYPE: ACUTE PAIN;CHRONIC PAIN

## 2021-05-31 ASSESSMENT — PAIN DESCRIPTION - LOCATION
LOCATION: LEG;BACK
LOCATION: BACK

## 2021-05-31 ASSESSMENT — PAIN - FUNCTIONAL ASSESSMENT: PAIN_FUNCTIONAL_ASSESSMENT: ACTIVITIES ARE NOT PREVENTED

## 2021-05-31 NOTE — PROGRESS NOTES
Today's Date: 5/31/2021  Patient Name: Daryn Love  Date of admission: 5/27/2021  2:18 AM  Patient's age: 77 y. o., 1945  Admission Dx: Acute renal failure (ARF) ([facility] [address] [N17.9]    Reason for Consult: management recommendations  Requesting Physician: Ulysses Kulkarni DO    CHIEF COMPLAINT: Compression fracture of lumbar vertebra    Subjective:  Seen and examined. Clinically stable. No acute events overnight. Plan for neurosurgical intervention likely on 6/3/2021        Serum IFX Interp 05/27/2021  2:12  [address]   A ZONE OF RESTRICTION IS PRESENT IN THE BETA GLOBULIN REGION.  CONFIRMED BY IMMUNOFIXATION TO BE MONOCLONAL   Comment:   IgA LAMBDA (1.31 G/DL). FREE MONOCLONAL LIGHT CHAINS ARE PRESENT, IDENTIFIED AS      MRI thoracic spine:  Discrete enhancing lesions involving the spinous processes of the vertebral   bodies of T4, T6 and T7 and anterior aspect of vertebral body of T4   concerning for metastatic disease.       Diffuse T1 and T2 hypointensity of the marrow which may be effect of anemia   or chemotherapy.       Only partially included on the acquired images there is a diffuse   infiltrative mass lesion surrounding the anterior and lateral aspect of L1   vertebral body.  Clinical correlation and if appropriate contrast enhanced CT   of abdomen and pelvis is recommended. MRI cervical spine  Anterior aspect of C4 has enhancing lesion.  Finding may be related to   atypical hemangioma or marrow infiltration.       At C5-C6, moderate canal stenosis and severe bilateral neural foraminal   narrowing.  Left foraminal disc protrusion/osteophytic ridging impinges on   the exiting left C6 nerve root         HISTORY OF PRESENT ILLNESS:      The patient is a 77 y.o. female with no significant past medical history, she initially presented to the ER at [facility] facility for worsening lower back pain, was found to have UTI with right ureteral stone and was treated with Bactrim for 7 days. Patient's back pain continued to worsen so follow-up MRI was done which showed burst L2 fracture with a 6 mm retropulsion of spinal canal with moderate canal narrowing. Patient was then transferred to [facility] for neurosurgery evaluation and admission. Currently, patient is awake alert sitting comfortably no acute distress. Vitals and labs reviewed patient admits to decreased appetite and weight loss since January. Neurosurgery has been consulted, will follow up on repeat CT chest abdomen and pelvis and MRI cervical and thoracic spine. Vitals stable  Labs reviewed. Creatinine 1.53, calcium 9.1, Hb 9.5    Past Medical History:   has a past medical history of Acute renal failure (ARF) (HCC), Compression fracture of lumbar vertebra ([address] Essential hypertension, Other hyperlipidemia, and Other specified hypothyroidism. Past Surgical History:   has a past surgical history that includes Finger trigger release (Left). Medications:    Prior to Admission medications    Medication Sig Start Date End Date Taking?  Authorizing Provider   naproxen (NAPROSYN) 500 MG tablet Take 500 mg by mouth 2 times daily (with meals)   Yes Historical Provider, MD   latanoprost (XALATAN) 0.005 % ophthalmic solution Place 1 drop into both eyes nightly   Yes Historical Provider, MD   levothyroxine (SYNTHROID) 50 MCG tablet Take 50 mcg by mouth Daily   Yes Historical Provider, MD   lisinopril (PRINIVIL;ZESTRIL) 20 MG tablet Take 20 mg by mouth daily   Yes Historical Provider, MD   simvastatin (ZOCOR) 20 MG tablet Take 20 mg by mouth nightly   Yes Historical Provider, MD   amLODIPine (NORVASC) 5 MG tablet Take 5 mg by mouth daily   Yes Historical Provider, MD   aspirin 81 MG chewable tablet Take 81 mg by mouth daily   Yes Historical Provider, MD     Current Facility-Administered Medications   Medication Dose Route Frequency Provider Last Rate Last Admin    polyethylene glycol (GLYCOLAX) packet 17 g  17 g Oral Daily Cailin Marte DO        [START ON 6/1/2021] amLODIPine (NORVASC) tablet 10 mg  10 mg Oral Daily Danyel Briceno MD        amLODIPine (NORVASC) tablet 5 mg  5 mg Oral Once Lizbet Brenner MD        docusate sodium (COLACE) capsule 100 mg  100 mg Oral Daily Angelikaaaron Fisher, DO   100 mg at 05/31/21 0857    HYDROcodone-acetaminophen (Jillyn Mention) 5-325 MG per tablet 1 tablet  1 tablet Oral Q4H PRN Maricarmen Rodney, APRN - CNP   1 tablet at 05/30/21 1115    Or    HYDROcodone-acetaminophen (NORCO) 5-325 MG per tablet 2 tablet  2 tablet Oral Q4H PRN Maricarmen Rodney, APRN - CNP   2 tablet at 05/31/21 1056    senna (SENOKOT) tablet 8.6 mg  1 tablet Oral Nightly Maricarmen Rodney, APRN - CNP   8.6 mg at 05/30/21 2029    sodium chloride flush 0.9 % injection 5-40 mL  5-40 mL Intravenous 2 times per day Manuel Lincoln MD   10 mL at 05/29/21 2015    sodium chloride flush 0.9 % injection 5-40 mL  5-40 mL Intravenous PRN Manuel Lincoln MD        0.9 % sodium chloride infusion  25 mL Intravenous PRN Manuel Lincoln MD        nicotine (NICODERM CQ) 21 MG/24HR 1 patch  1 patch Transdermal Daily PRN Manuel Lincoln MD        acetaminophen (TYLENOL) tablet 650 mg  650 mg Oral Q6H PRN Manuel Lincoln MD   650 mg at 05/27/21 2147    Or    acetaminophen (TYLENOL) suppository 650 mg  650 mg Rectal Q6H PRN Manuel Lincoln MD        heparin (porcine) injection 5,000 Units  5,000 Units Subcutaneous 3 times per day Manuel Lincoln MD        latanoprost (XALATAN) 0.005 % ophthalmic solution 1 drop  1 drop Both Eyes Nightly Ric Miracle, APRN - NP   1 drop at 05/30/21 2033    levothyroxine (SYNTHROID) tablet 50 mcg  50 mcg Oral Daily Gailie Miracle, APRN - NP   50 mcg at 05/31/21 0533    atorvastatin (LIPITOR) tablet 20 mg  20 mg Oral Daily Gailie Miracle, APRN - NP   20 mg at 05/30/21 2029    sodium chloride flush 0.9 % injection 10 mL  10 mL Intravenous PRN Cailin Marte DO           Allergies:  Patient has no known allergies. Social History:   reports that she has never smoked. She has never used smokeless tobacco. She reports previous alcohol use. She reports that she does not use drugs. Family History: family history includes No Known Problems in her father and mother. REVIEW OF SYSTEMS:      Constitutional: No fever or chills. No night sweats, no weight loss   Eyes: No eye discharge, double vision, or eye pain   HEENT: negative for sore mouth, sore throat, hoarseness and voice change   Respiratory: negative for cough , sputum, dyspnea, wheezing, hemoptysis, chest pain   Cardiovascular: negative for chest pain, dyspnea, palpitations, orthopnea, PND   Gastrointestinal: negative for nausea, vomiting, diarrhea, constipation, abdominal pain, Dysphagia, hematemesis and hematochezia   Genitourinary: negative for frequency, dysuria, nocturia, urinary incontinence, and hematuria   Integument: negative for rash, skin lesions, bruises.    Hematologic/Lymphatic: negative for easy bruising, bleeding, lymphadenopathy, or petechiae   Endocrine: negative for heat or cold intolerance,weight changes, change in bowel habits and hair loss   Musculoskeletal: negative for myalgias, arthralgias, pain, joint swelling,and bone pain   Neurological: negative for headaches, dizziness, seizures, weakness, numbness    PHYSICAL EXAM:        BP (!) 161/99   Pulse 94   Temp 98 °F (36.7 °C) (Oral)   Resp 18   Ht 5' (1.524 m)   Wt 94 lb 9.2 oz (42.9 kg)   SpO2 97%   BMI 18.47 kg/m²    Temp (24hrs), Av.4 °F (36.9 °C), Min:98 °F (36.7 °C), Max:98.7 °F (37.1 °C)      General appearance - well appearing, no in pain or distress   Mental status - alert and cooperative   Eyes - pupils equal and reactive, extraocular eye movements intact   Ears - bilateral TM's and external ear canals normal   Mouth - mucous membranes moist, pharynx normal without lesions   Neck - supple, no significant adenopathy   Lymphatics - no palpable lymphadenopathy, no hepatosplenomegaly   Chest - clear to auscultation, no wheezes, rales or rhonchi, symmetric air entry   Heart - normal rate, regular rhythm, normal S1, S2, no murmurs  Abdomen - soft, nontender, nondistended, no masses or organomegaly   Neurological - alert, oriented, normal speech, no focal findings or movement disorder noted   Musculoskeletal - no joint tenderness, deformity or swelling   Extremities - peripheral pulses normal, no pedal edema, no clubbing or cyanosis   Skin - normal coloration and turgor, no rashes, no suspicious skin lesions noted ,      DATA:      Labs:     Results for orders placed or performed during the hospital encounter of 05/27/21   Urinalysis with microscopic   Result Value Ref Range    Color, UA ORANGE (A) YELLOW    Turbidity UA CLEAR CLEAR    Glucose, Ur NEGATIVE NEGATIVE    Bilirubin Urine NEGATIVE NEGATIVE    Ketones, Urine NEGATIVE NEGATIVE    Specific Gravity, UA 1.014 1.005 - 1.030    Urine Hgb LARGE (A) NEGATIVE    pH, UA 7.0 5.0 - 8.0    Protein, UA 2+ (A) NEGATIVE    Urobilinogen, Urine Normal Normal    Nitrite, Urine NEGATIVE NEGATIVE    Leukocyte Esterase, Urine TRACE (A) NEGATIVE    -          WBC, UA 10 TO 20 0 - 5 /HPF    RBC, UA TOO NUMEROUS TO COUNT 0 - 4 /HPF    Casts UA  0 - 8 /LPF     2 TO 5 HYALINE Reference range defined for non-centrifuged specimen. Crystals, UA NOT REPORTED None /HPF    Epithelial Cells UA 0 TO 2 0 - 5 /HPF    Renal Epithelial, UA NOT REPORTED 0 /HPF    Bacteria, UA NOT REPORTED None    Mucus, UA NOT REPORTED None    Trichomonas, UA NOT REPORTED None    Amorphous, UA NOT REPORTED None    Other Observations UA NOT REPORTED NOT REQ.     Yeast, UA NOT REPORTED None   Sodium, urine, random   Result Value Ref Range    Sodium,Ur 110 mmol/L   Protein, urine, random   Result Value Ref Range    Total Protein, Urine 354 mg/dL   Osmolality, urine   Result Value Ref Range    Osmolality, Ur 421 80 - 1300 mOsm/kg   Creatinine, urine, random   Result Value Ref Range    Creatinine, Ur 54.7 28.0 - 217.0 mg/dL   CBC   Result Value Ref Range    WBC 7.5 3.5 - 11.3 k/uL    RBC 3.29 (L) 3.95 - 5.11 m/uL    Hemoglobin 9.5 (L) 11.9 - 15.1 g/dL    Hematocrit 30.1 (L) 36.3 - 47.1 %    MCV 91.5 82.6 - 102.9 fL    MCH 28.9 25.2 - 33.5 pg    MCHC 31.6 28.4 - 34.8 g/dL    RDW 13.6 11.8 - 14.4 %    Platelets 314 881 - 630 k/uL    MPV 9.8 8.1 - 13.5 fL    NRBC Automated 0.0 0.0 per 100 WBC   Comprehensive Metabolic Panel w/ Reflex to MG   Result Value Ref Range    Glucose 154 (H) 70 - 99 mg/dL    BUN 27 (H) 8 - 23 mg/dL    CREATININE 1.53 (H) 0.50 - 0.90 mg/dL    Bun/Cre Ratio NOT REPORTED 9 - 20    Calcium 9.1 8.6 - 10.4 mg/dL    Sodium 142 135 - 144 mmol/L    Potassium 4.1 3.7 - 5.3 mmol/L    Chloride 105 98 - 107 mmol/L    CO2 23 20 - 31 mmol/L    Anion Gap 14 9 - 17 mmol/L    Alkaline Phosphatase 52 35 - 104 U/L    ALT 11 5 - 33 U/L    AST 16 <32 U/L    Total Bilirubin 0.24 (L) 0.3 - 1.2 mg/dL    Total Protein 7.2 6.4 - 8.3 g/dL    Albumin 3.2 (L) 3.5 - 5.2 g/dL    Albumin/Globulin Ratio 0.8 (L) 1.0 - 2.5    GFR Non- 33 (L) >60 mL/min    GFR African American 40 (L) >60 mL/min    GFR Comment          GFR Staging NOT REPORTED    Magnesium   Result Value Ref Range    Magnesium 1.8 1.6 - 2.6 mg/dL   Protime-INR   Result Value Ref Range    Protime 12.4 (H) 9.1 - 12.3 sec    INR 1.2    Vitamin B12 & Folate   Result Value Ref Range    Vitamin B-12 857 232 - 1245 pg/mL    Folate >20.0 >4.8 ng/mL   Iron and TIBC   Result Value Ref Range    Iron 75 37 - 145 ug/dL    TIBC 215 (L) 250 - 450 ug/dL    Iron Saturation 35 20 - 55 %    UIBC 140 112 - 347 ug/dL   Ferritin   Result Value Ref Range    Ferritin 292 (H) 13 - 150 ug/L   Reticulocytes   Result Value Ref Range    Retic % 1.6 0.5 - 1.9 %    Absolute Retic # 0.050 0.030 - 0.080 M/uL    Immature Retic Fract 12.400 2.7 - 18.3 %    Retic Hemoglobin 30.8 28.2 - 35.7 pg   TSH with Reflex   Result Value Ref Range    TSH 7.01 (H) 0.30 - 5.00 mIU/L   Electrophoresis Protein, Serum without Reflex to Immunofixation   Result Value Ref Range    Total Protein 6.8 6.4 - 8.3 g/dL    Albumin (calculated) 3.3 3.2 - 5.2 g/dL    Albumin % 49 45 - 65 %    Alpha-1-Globulin 0.2 0.1 - 0.4 g/dL    Alpha 1 % 3 3 - 6 %    Alpha-2-Globulin 0.9 0.5 - 0.9 g/dL    Alpha 2 % 13 6 - 13 %    Beta Globulin 2.1 (H) 0.5 - 1.1 g/dL    Beta Percent 31 (H) 11 - 19 %    Gamma Globulin 0.3 (L) 0.5 - 1.5 g/dL    Gamma Globulin % 4 (L) 9 - 20 %    Total Prot. Sum 6.8 6.3 - 8.2 g/dL    Total Prot. Sum,% 100 98 - 102 %    Protein Electrophoresis, Serum       A ZONE OF RESTRICTION IS PRESENT IN THE BETA GLOBULIN REGION. CONFIRMED BY IMMUNOFIXATION TO BE MONOCLONAL    Pathologist ELECTRONICALLY SIGNED. Holger Angel M.D. IMMUNOFIXATION SERUM PROFILE   Result Value Ref Range    Serum IFX Interp       A ZONE OF RESTRICTION IS PRESENT IN THE BETA GLOBULIN REGION. CONFIRMED BY IMMUNOFIXATION TO BE MONOCLONAL    Pathologist ELECTRONICALLY SIGNED. Holger Angel M.D.    KAPPA/LAMBDA QUANT FREE LIGHT CHAINS SERUM   Result Value Ref Range    Kappa Free Light Chains QNT 0.97 0.37 - 1.94 mg/dL    Lambda Free Light Chains .34 (H) 0.57 - 2.63 mg/dL    Free Kappa/Lambda Ratio 0.01 (L) 0.26 - 1.65   IMMUNOGLOBULINS PANEL   Result Value Ref Range    IgG <300 (L) 700 - 1600 mg/dL    IgA 2250 (H) 70 - 400 mg/dL    IgM <25 (L) 40 - 230 mg/dL   T4, Free   Result Value Ref Range    Thyroxine, Free 1.35 0.93 - 1.70 ng/dL   PROTEIN ELECTROPHORESIS, URINE   Result Value Ref Range    Specimen Type . URINE     Urine Total Protein 180 mg/dL    P E Interpretation, U PENDING     Pathologist PENDING    CBC   Result Value Ref Range    WBC 7.6 3.5 - 11.3 k/uL    RBC 3.08 (L) 3.95 - 5.11 m/uL    Hemoglobin 8.7 (L) 11.9 - 15.1 g/dL    Hematocrit 29.5 (L) 36.3 - 47.1 %    MCV 95.8 82.6 - 102.9 fL    MCH 28.2 25.2 - 33.5 pg    MCHC 29.5 28.4 - 34.8 g/dL    RDW 13.3 11.8 - 14.4 %    Platelets 494 547 - 453 k/uL    MPV 9.4 8.1 - 13.5 fL    NRBC Automated 0.0 0.0 per 100 WBC   Comprehensive Metabolic Panel w/ Reflex to MG   Result Value Ref Range    Glucose 83 70 - 99 mg/dL    BUN 25 (H) 8 - 23 mg/dL    CREATININE 1.61 (H) 0.50 - 0.90 mg/dL    Bun/Cre Ratio NOT REPORTED 9 - 20    Calcium 8.5 (L) 8.6 - 10.4 mg/dL    Sodium 140 135 - 144 mmol/L    Potassium 3.9 3.7 - 5.3 mmol/L    Chloride 107 98 - 107 mmol/L    CO2 23 20 - 31 mmol/L    Anion Gap 10 9 - 17 mmol/L    Alkaline Phosphatase 47 35 - 104 U/L    ALT 9 5 - 33 U/L    AST 15 <32 U/L    Total Bilirubin 0.20 (L) 0.3 - 1.2 mg/dL    Total Protein 6.8 6.4 - 8.3 g/dL    Albumin 3.0 (L) 3.5 - 5.2 g/dL    Albumin/Globulin Ratio 0.8 (L) 1.0 - 2.5    GFR Non- 31 (L) >60 mL/min    GFR  38 (L) >60 mL/min    GFR Comment          GFR Staging NOT REPORTED    BETA 2 MICROGLOBULIN, SERUM   Result Value Ref Range    Beta-2 Microglobulin 6.9 (H) 0.6 - 2.4 mg/L   Protein / Creatinine Ratio, Urine   Result Value Ref Range    Total Protein, Urine 180 mg/dL    Creatinine, Ur 30.5 28.0 - 217.0 mg/dL    Urine Total Protein Creatinine Ratio 5.90 (H) 0.00 - 0.20   URINALYSIS WITH MICROSCOPIC   Result Value Ref Range    Color, UA YELLOW YELLOW    Turbidity UA CLEAR CLEAR    Glucose, Ur NEGATIVE NEGATIVE    Bilirubin Urine NEGATIVE NEGATIVE    Ketones, Urine NEGATIVE NEGATIVE    Specific Gravity, UA 1.010 1.005 - 1.030    Urine Hgb LARGE (A) NEGATIVE    pH, UA 6.0 5.0 - 8.0    Protein, UA 2+ (A) NEGATIVE    Urobilinogen, Urine Normal Normal    Nitrite, Urine NEGATIVE NEGATIVE    Leukocyte Esterase, Urine NEGATIVE NEGATIVE    -          WBC, UA 5 TO 10 0 - 5 /HPF    RBC, UA TOO NUMEROUS TO COUNT 0 - 4 /HPF    Casts UA  0 - 8 /LPF     2 TO 5 HYALINE Reference range defined for non-centrifuged specimen.     Crystals, UA NOT REPORTED None /HPF    Epithelial Cells UA 0 TO 2 0 - 5 /HPF    Renal Epithelial, UA NOT REPORTED 0 /HPF    Bacteria, UA NOT REPORTED None mL/min    GFR Comment          GFR Staging NOT REPORTED    Immunofixation urine random profile   Result Value Ref Range    Urine IFX Specimen . URINE     Volume NOT REPORTED mL    Urine Total Protein 180 mg/dL    Urine IFX Interp PENDING    BASIC METABOLIC PANEL   Result Value Ref Range    Glucose 98 70 - 99 mg/dL    BUN 24 (H) 8 - 23 mg/dL    CREATININE 1.27 (H) 0.50 - 0.90 mg/dL    Bun/Cre Ratio NOT REPORTED 9 - 20    Calcium 8.5 (L) 8.6 - 10.4 mg/dL    Sodium 139 135 - 144 mmol/L    Potassium 3.5 (L) 3.7 - 5.3 mmol/L    Chloride 105 98 - 107 mmol/L    CO2 21 20 - 31 mmol/L    Anion Gap 13 9 - 17 mmol/L    GFR Non-African American 41 (L) >60 mL/min    GFR African American 50 (L) >60 mL/min    GFR Comment          GFR Staging NOT REPORTED    POC Glucose Fingerstick   Result Value Ref Range    POC Glucose 82 65 - 105 mg/dL   POC Glucose Fingerstick   Result Value Ref Range    POC Glucose 96 65 - 105 mg/dL   TYPE AND SCREEN   Result Value Ref Range    Expiration Date 05/31/2021,2359     Arm Band Number [id number]     ABO/Rh A POSITIVE     Antibody Screen NEGATIVE    BLOOD BANK SPECIMEN   Result Value Ref Range    Blood Bank Specimen NOT REPORTED          IMAGING DATA:    US RENAL COMPLETE    Result Date: 5/27/2021  EXAMINATION: RETROPERITONEAL ULTRASOUND OF THE KIDNEYS AND URINARY BLADDER 5/27/2021 COMPARISON: None HISTORY: ORDERING SYSTEM PROVIDED HISTORY: ARF TECHNOLOGIST PROVIDED HISTORY: US RETROPERITONEAL COMPLETE ARF FINDINGS: Kidneys: The right kidney measures 10.0 x 4.0 x 3.5 cm and the left kidney measures 10.5 x 3.3 x 5.2 cm. Cortical thickness within normal limits bilaterally. There is diffuse increased renal parenchymal echogenicity bilaterally with increased prominence of the relative hypoechoic renal pyramids. A tiny 7 x 5 x 6 mm right lower pole renal cyst is noted. No other focal renal lesion. No hydronephrosis in either kidney. No sonographic evidence for renal calculi.  Bladder: Unremarkable appearance of the bladder. No dilated distal ureters. The patient did not have the urge to void. Bilateral ureteral jets were identified. Diffuse increased renal parenchymal echogenicity bilaterally consistent with medical renal disease. No hydronephrosis in either kidney. 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 Pathological fracture of L2 ? Differentials could be benign fracture, primary bone tumor or metastatic cancer with unknown primary at this point. Will need more work-up. 2. AMERICO  3. 6 mm retropulsion of spinal canal with moderate canal narrowing    RECOMMENDATIONS:    1. No changes clinically. Neurosurgery planning for surgical resection of spine lesion on 6/3/2021 tentatively. 2. IgA lambda multiple myeloma. 3. Further treatment for myeloma will be after the surgical resection and recovery. She will benefit from local radiation therapy followed by systemic treatment with targeted agents. All explained to the patient and the family. 4. We will do a bone marrow test down the line.   However I do not do with now to avoid back complications due to positioning during the test.                          [address] [facility], [facility]                            This note is created with the assistance of a speech recognition program.  While intending to generate a document that actually reflects the content of the visit, the document can still have some errors including those of syntax and sound a like substitutions which may escape proof reading. It such instances, actual meaning can be extrapolated by contextual diversion.

## 2021-05-31 NOTE — PROGRESS NOTES
[facility]  Office: [address], DO, Risa Stout, DO, Erasmo Beti, DO, Derekaleida Campbell, DO, Thad Melvin MD, Lion Shannon MD, Kelley Solano MD, Olesya Klein MD, Ina Wagner MD, Nazanin Murguia MD, Jeniffer Preciado MD, Ricardo Rose MD, María Elena Denise DO, Billy Hough MD, Jaycob Khan DO, Lisette Reilly MD,  Sreedhar Mcdaniel DO, Izzy Kapoor MD, Asad Agosto MD, Kelly Jones MD, Valentine Astorga MD, Franki Mccormick, [employer], [facility], [employer], Eyal Avendaño, CNP, Kwame Zamora, CNS, Amarilys Verdugo, CNP, Noel Bravo, CNP, Vinicius Castano, CNP, Sue nIteriano, CNP, Frandy Baeza, CNP, Susannah Elias PA-C, Betsy Carpenter, [facility], Jesse Rose, CNP, Jeananne Goltz, CNP, Jazmin Joseph, CNP, Lyubov Sofia, CNP, Lucy Quinones, CNP, Ladarius Encarnacion, [address] [place] [address]    Progress Note    5/31/2021    4:11 PM    Name:   Miguelina Nuñez  MRN:     [id number]     Acct:      [de-identified]   Room:   [address] [place] [address] Day:  4  Admit Date:  5/27/2021  2:18 AM    PCP:   No primary care provider on file. Code Status:  Full Code    Subjective:     C/C: Back pain. Interval History Status: not changed. Pt seen and examined. No acute events overnight. Pt is tolerating her diet in no distress. No lower ext numbness or weakness. No fevers or chills. Cr stable. No fevers chills. She does continue to complain of back pain. Brief History: This is a 78-year-old female who initially presented to the hospital with complaints of increasing back pain. Initial MRI did show burst L2 fracture with 6 mm retropulsion of the spinal canal with moderate canal narrowing along with lucency in the right side. Patient was transferred for neurosurgery evaluation. MRI was concerning for pathologic fracture. IgA level 2250, kappa lambda ratio 0.01 and of work-up pending. Tentative plan for OR this wednesday per Neurosurgery. Review of Systems:     Constitutional:  negative for chills, fevers, sweats  Respiratory:  negative for cough, dyspnea on exertion, shortness of breath, wheezing  Cardiovascular:  negative for chest pain, chest pressure/discomfort, lower extremity edema, palpitations  Gastrointestinal:  negative for abdominal pain, constipation, diarrhea, nausea, vomiting  Neurological:  negative for dizziness, headache admits to back pain that's stable. Medications: Allergies:  No Known Allergies    Current Meds:   Scheduled Meds:    polyethylene glycol  17 g Oral Daily    [START ON 2021] amLODIPine  10 mg Oral Daily    docusate sodium  100 mg Oral Daily    senna  1 tablet Oral Nightly    sodium chloride flush  5-40 mL Intravenous 2 times per day    heparin (porcine)  5,000 Units Subcutaneous 3 times per day    latanoprost  1 drop Both Eyes Nightly    levothyroxine  50 mcg Oral Daily    atorvastatin  20 mg Oral Daily     Continuous Infusions:    sodium chloride       PRN Meds: HYDROcodone 5 mg - acetaminophen **OR** HYDROcodone 5 mg - acetaminophen, sodium chloride flush, sodium chloride, nicotine, acetaminophen **OR** acetaminophen, sodium chloride flush    Data:     Past Medical History:   has a past medical history of Acute renal failure (ARF) ([facility] [address] Compression fracture of lumbar vertebra ([facility] [address] Essential hypertension, Other hyperlipidemia, and Other specified hypothyroidism. Social History:   reports that she has never smoked. She has never used smokeless tobacco. She reports previous alcohol use. She reports that she does not use drugs.      Family History:   Family History   Problem Relation Age of Onset    No Known Problems Mother     No Known Problems Father        Vitals:  BP (!) 161/99   Pulse 94   Temp 98 °F (36.7 °C) (Oral)   Resp 18   Ht 5' (1.524 m)   Wt 94 lb 9.2 oz (42.9 kg)   SpO2 97%   BMI 18.47 kg/m²   Temp (24hrs), Av.4 °F (36.9 °C), Min:98 °F (36.7 °C), Max:98.7 °F (37.1 °C)    Recent Labs     05/28/21 2112   POCGLU 96       I/O (24Hr): No intake or output data in the 24 hours ending 05/31/21 1611    Labs:  Hematology:  Recent Labs     05/29/21  0647   WBC 8.0   RBC 2.86*   HGB 8.1*   HCT 27.5*   MCV 96.2   MCH 28.3   MCHC 29.5   RDW 13.4      MPV 9.7     Chemistry:  Recent Labs     05/29/21  0647 05/30/21  1124 05/31/21  0621    139 139   K 3.7 3.6* 3.5*    103 105   CO2 22 25 21   GLUCOSE 82 118* 98   BUN 25* 25* 24*   CREATININE 1.18* 1.33* 1.27*   ANIONGAP 9 11 13   LABGLOM 45* 39* 41*   GFRAA 54* 47* 50*   CALCIUM 8.6 8.7 8.5*     Recent Labs     05/28/21 2112 05/29/21  0647   PROT  --  6.6   LABALBU  --  3.0*   AST  --  15   ALT  --  9   ALKPHOS  --  43   BILITOT  --  0.15*   POCGLU 96  --      ABG:No results found for: POCPH, PHART, PH, POCPCO2, YVZ2MNG, PCO2, POCPO2, PO2ART, PO2, POCHCO3, MVL3LXY, HCO3, NBEA, PBEA, BEART, BE, THGBART, THB, LQM4UMW, SNFS4OLD, I6TQYZOZ, O2SAT, FIO2  No results found for: SPECIAL  No results found for: CULTURE    Radiology:  MRI CERVICAL SPINE W WO CONTRAST    Result Date: 5/27/2021  No evidence of metastatic disease inside the spinal canal. C2 and C3 vertebral body demonstrate fat marrow signal and remainder of the cervical vertebral bodies have diffuse T1 and T2 hypointensity. C2 and C3 may have been exposed to prior radiation. The appearance of the remainder of vertebral bodies may be related to anemia or other diffuse marrow abnormalities. Anterior aspect of C4 has enhancing lesion. Finding may be related to atypical hemangioma or marrow infiltration. At C5-C6, moderate canal stenosis and severe bilateral neural foraminal narrowing. Left foraminal disc protrusion/osteophytic ridging impinges on the exiting left C6 nerve root. Other mild to moderate degenerative changes of cervical spine as described.      MRI THORACIC SPINE W WO CONTRAST    Addendum Date: 5/27/2021    ADDENDUM: Critical results were called by  Kiki Guillen MD to Rehan Eagle on 5/27/2021 at 19:26. The reported infiltrative mass lesion within the upper lumbar spine may also be related to hematoma . Result Date: 5/27/2021  Discrete enhancing lesions involving the spinous processes of the vertebral bodies of T4, T6 and T7 and anterior aspect of vertebral body of T4 concerning for metastatic disease. Diffuse T1 and T2 hypointensity of the marrow which may be effect of anemia or chemotherapy. Only partially included on the acquired images there is a diffuse infiltrative mass lesion surrounding the anterior and lateral aspect of L1 vertebral body. Clinical correlation and if appropriate contrast enhanced CT of abdomen and pelvis is recommended. The findings were sent to the Radiology Results [address] at 7:10 pm on 5/27/2021to be communicated to a licensed caregiver. US RENAL COMPLETE    Result Date: 5/27/2021  Diffuse increased renal parenchymal echogenicity bilaterally consistent with medical renal disease. No hydronephrosis in either kidney.        Physical Examination:        General appearance:  alert, cooperative and no distress  Mental Status:  oriented to person, place and time and normal affect  Lungs:  clear to auscultation bilaterally, normal effort  Heart:  regular rate and rhythm, no murmur  Abdomen:  soft, nontender, nondistended, normal bowel sounds, no masses, hepatomegaly, splenomegaly  Extremities:  no edema, redness, tenderness in the calves  Skin:  no gross lesions, rashes, induration    Assessment:        Hospital Problems         Last Modified POA    * (Principal) Compression fracture of lumbar vertebra ([address] 5/27/2021 Yes    Acute renal failure (ARF) ([address] 5/27/2021 Yes    Essential hypertension 5/27/2021 Yes    Other hyperlipidemia 5/27/2021 Yes    Subclinical hypothyroidism 5/27/2021 Yes    Pathologic lumbar vertebral fracture 5/29/2021 Yes    Normocytic normochromic anemia 5/27/2021 Yes    Multiple myeloma not having achieved remission ([facility] [address] 5/27/2021 Yes    Moderate protein-calorie malnutrition ([facility] [address] 5/28/2021 Yes    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 Pathologic fracture of lumbar Vertebra: Neurosurgery consulted, plan for OR early next week. Continue PT/OT. TLSO brace ordered. Hold ASA,  2. Workup for MM in progress. Kappa/Lambda 0.97/136. Immunoglobulin panel: IGA: 2250. Oncology consulted, need tissue diagnosis. 3. Protein calorie malnutrition: liberalize diet, High calorie supplement. 4. Nephrotic range proteinuria: increase norvasc 10 mg . Add ACE/ARB when AMERICO resolves  5. Monitor Hgb, transfuse prn for symptomatic anemia or Hgb <7  6. Continue Lipitor 20 mg daily. 7. Continue DVT ppx.  8. Continue Synthroid 50 mcg daily. 9. Risk stratification: patient is low/intermediate risk for surgery.        Pastor Diggs DO  5/31/2021  4:11 PM \

## 2021-05-31 NOTE — PROGRESS NOTES
Neurosurgery MARIBELL/Resident    Daily Progress Note   CC:No chief complaint on file. 5/31/2021  9:21 AM    Chart reviewed. No acute events overnight. No new complaints. Patient seen and examined this morning. She is eating breakfast.  Reports numbness in bilateral thighs anterior aspect only left side greater than right, denies chest pain, shortness of breath does report lumbar back pain upon palpation    Vitals:    05/30/21 0829 05/30/21 1139 05/30/21 1911 05/31/21 0913   BP: (!) 178/90 (!) 175/78 (!) 176/85 (!) 167/83   Pulse: 80 84 96 91   Resp: 18 16 16 16   Temp: 97.9 °F (36.6 °C) 98.3 °F (36.8 °C) 98.7 °F (37.1 °C) 98.6 °F (37 °C)   TempSrc:    Oral   SpO2: 96% 92% 96% 93%   Weight:       Height:           PE:   AOx3   PERRL, EOMI    Motor   L deltoid 5/5; R deltoid 5/5  L biceps 5/5; R biceps 5/5  L triceps 5/5; R triceps 5/5     L iliopsoas 5/5 , R iliopsoas 5/5  L quadriceps 5/5; R quadriceps 5/5  L Dorsiflexion 5/5; R dorsiflexion 5/5  L Plantarflexion 5/5; R plantarflexion 5/5  L EHL 5/5; R EHL 5/5    Sensation: Numbness anterior aspects bilateral thighs left greater than right      Lab Results   Component Value Date    WBC 8.0 05/29/2021    HGB 8.1 (L) 05/29/2021    HCT 27.5 (L) 05/29/2021     05/29/2021    ALT 9 05/29/2021    AST 15 05/29/2021     05/31/2021    K 3.5 (L) 05/31/2021     05/31/2021    CREATININE 1.27 (H) 05/31/2021    BUN 24 (H) 05/31/2021    CO2 21 05/31/2021    TSH 7.01 (H) 05/27/2021    INR 1.2 05/27/2021       Radiology CT LUMBAR SPINE WO CONTRAST    Result Date: 5/30/2021  EXAMINATION: CT OF THE LUMBAR SPINE WITHOUT CONTRAST  5/30/2021 TECHNIQUE: CT of the lumbar spine was performed without the administration of intravenous contrast. Multiplanar reformatted images are provided for review. Dose modulation, iterative reconstruction, and/or weight based adjustment of the mA/kV was utilized to reduce the radiation dose to as low as reasonably achievable.  COMPARISON: Lumbar spine MRI performed 05/25/2021. HISTORY: ORDERING SYSTEM PROVIDED HISTORY: HISTORY OF PATHOLOGIC FRACTURE TECHNOLOGIST PROVIDED HISTORY: Be sure to include T12 pathologic fracture, surgical planning FINDINGS: BONES/ALIGNMENT: There is redemonstration a pathologic compression deformity at L2 is better evaluated on the recent MRI examination. There is partial visualization of the extensive extraosseous involvement. The remaining lumbar vertebral body heights are. DEGENERATIVE CHANGES: There is multilevel degenerative disc disease and multilevel degenerative facet hypertrophy. There is retrolisthesis of L2 on L3. There is disc space narrowing at the lumbosacral junction. There is subtle visualization of canal stenosis at the L2 level related to the extra osseous extension and this is better visualized on recent MRI. There is bilateral foraminal narrowing partially visualized L2-3 related to the extra osseous extension. This is also better visualized on recent MRI. There is mild degenerative bilateral foraminal narrowing at L5-S1. SOFT TISSUES/RETROPERITONEUM: Posterior paraspinal soft tissues are. There is partial visualization of a fibroid uterus. There are small bilateral pleural effusions with adjacent infiltrates in lung bases. Redemonstration of a pathologic compression fracture of L2 with extraosseous extension resulting in canal stenosis as well as bilateral L2-3 foraminal narrowing. This is better evaluated on the recent MRI examination. Multilevel degenerative change with degenerative bilateral foraminal narrowing at L5-S1. Small bibasilar effusions with adjacent infiltrates. MRI CERVICAL SPINE W WO CONTRAST    Result Date: 5/27/2021  EXAMINATION: MRI OF THE CERVICAL SPINE WITHOUT AND WITH CONTRAST  5/27/2021 3:50 pm: TECHNIQUE: Multiplanar multisequence MRI of the cervical spine was performed without and with the administration of intravenous contrast. COMPARISON: None.  HISTORY: ORDERING SYSTEM PROVIDED HISTORY: metastatic workup TECHNOLOGIST PROVIDED HISTORY: metastatic workup Reason for Exam: metastatic workup Acuity: Acute Type of Exam: Initial FINDINGS: BONES/ALIGNMENT: There is normal alignment of the spine. The vertebral body heights are maintained. C2 and C3 vertebral body demonstrate fat marrow signal and remainder of the cervical vertebral bodies have diffuse T1 and T2 hypointensity. This appearance may be related to anemia or other diffuse marrow abnormalities. Anterior aspect of C4 has enhancing lesion. Finding may be related to atypical hemangioma or marrow infiltration. There is inhomogeneous fat suppression of the cervical spine causing artifactual increased signal on postcontrast images within the lower cervical spine. SPINAL CORD: No abnormal cord signal is seen. SOFT TISSUES: No abnormal enhancement of the cervical spine. No paraspinal mass identified. C2-C3: There is no significant disc protrusion, spinal canal stenosis or neural foraminal narrowing. C3-C4: Grade 1 retrolisthesis, posterior uncovertebral hypertrophy and 3 mm disc bulging efface the anterior thecal sac. Mild bilateral facet arthropathy. Findings resulting in moderate left neural foraminal narrowing. C4-C5: Grade 1 anterolisthesis with disc bulging. Mild bilateral facet arthropathy. Mild right neural foraminal narrowing. C5-C6: Grade 1 retrolisthesis, posterior uncovertebral hypertrophy and disc bulging. 3 mm left foraminal disc protrusion/osteophytic ridge impinges on the exiting left C6 nerve root. Moderate bilateral facet arthropathy and ligamentum flavum thickening. Findings result in moderate canal stenosis and severe bilateral neural foraminal narrowing. C6-C7: Grade 1 retrolisthesis and 2 mm disc bulging. Moderate left facet arthropathy. Findings resulting in moderate left neural foraminal narrowing. C7-T1: Subtle disc bulging. Mild bilateral facet arthropathy. Otherwise unremarkable. No evidence of metastatic disease inside the spinal canal. C2 and C3 vertebral body demonstrate fat marrow signal and remainder of the cervical vertebral bodies have diffuse T1 and T2 hypointensity. C2 and C3 may have been exposed to prior radiation. The appearance of the remainder of vertebral bodies may be related to anemia or other diffuse marrow abnormalities. Anterior aspect of C4 has enhancing lesion. Finding may be related to atypical hemangioma or marrow infiltration. At C5-C6, moderate canal stenosis and severe bilateral neural foraminal narrowing. Left foraminal disc protrusion/osteophytic ridging impinges on the exiting left C6 nerve root. Other mild to moderate degenerative changes of cervical spine as described. MRI THORACIC SPINE W WO CONTRAST    Addendum Date: 5/27/2021    ADDENDUM: Critical results were called by Dr. Niki Browning MD to Ashvin Kidd on 5/27/2021 at 19:26. The reported infiltrative mass lesion within the upper lumbar spine may also be related to hematoma . Result Date: 5/27/2021  EXAMINATION: MRI OF THE THORACIC SPINE WITHOUT AND WITH CONTRAST  5/27/2021 3:50 pm TECHNIQUE: Multiplanar multisequence MRI of the thoracic spine was performed without and with the administration of intravenous contrast. COMPARISON: None HISTORY: ORDERING SYSTEM PROVIDED HISTORY: metastatic workup TECHNOLOGIST PROVIDED HISTORY: metastatic workup Reason for Exam: metastatic workup Acuity: Acute Type of Exam: Initial FINDINGS: BONES/ALIGNMENT: There is normal alignment of the spine. The vertebral body heights are maintained. The bone marrow signal appears unremarkable. There are discrete enhancing lesions involving the spinous processes of the vertebral bodies of T4, T6 and T7 and anterior aspect of vertebral body of T4 concerning for metastatic disease. There is diffuse T1 and T2 hypointensity of the marrow which may be effect of anemia or chemotherapy.   T1 hyperintense lesion of T11 vertebral body is likely focal fat. SPINAL CORD: No abnormal cord signal is seen. SOFT TISSUES:  No abnormal enhancement of the thoracic spine. Only partially included on the acquired images there is a diffuse infiltrative mass lesion surrounding the anterior and lateral aspect of L1 vertebral body. . DEGENERATIVE CHANGES: No significant spinal canal stenosis or neural foraminal narrowing of the thoracic spine. Subtle multilevel posterior disc bulging of mid to lower thoracic spine is noted. Discrete enhancing lesions involving the spinous processes of the vertebral bodies of T4, T6 and T7 and anterior aspect of vertebral body of T4 concerning for metastatic disease. Diffuse T1 and T2 hypointensity of the marrow which may be effect of anemia or chemotherapy. Only partially included on the acquired images there is a diffuse infiltrative mass lesion surrounding the anterior and lateral aspect of L1 vertebral body. Clinical correlation and if appropriate contrast enhanced CT of abdomen and pelvis is recommended. The findings were sent to the Radiology Results [address] at 7:10 pm on 5/27/2021to be communicated to a licensed caregiver. A/P  77 y.o. female who presents with L2 pathologic fracture with cord compression    TLSO brace while out of bed, ok to place on while sitting at side of bed   Planning for surgery Thursday for posterior L2 corpectomy, L1-L3 posterior fusion   On heparin at this time for DVT prophylaxis      Please contact neurosurgery with any changes in patients neurologic status.        Elisa Romero CNP  5/31/21  9:21 AM

## 2021-05-31 NOTE — PROGRESS NOTES
Renal Progress Note    Patient :  Luiza Torres; 77 y.o. MRN# [id number]  Location:  [facility]/1772-18  Attending:  Jacki Maza DO  Admit Date:  5/27/2021   Hospital Day: 4      Subjective: Following for AMERICO  With peak creatinine 1.6. Baseline normal.  Admitted with compression fracture. No labs today. Hematology following for new diagnosis of multiple myeloma. Eventual bone marrow biopsy  -170. Home ACE still on hold. Spinal fusion surgery planned for Thursday. Outpatient Medications:     Medications Prior to Admission: naproxen (NAPROSYN) 500 MG tablet, Take 500 mg by mouth 2 times daily (with meals)  latanoprost (XALATAN) 0.005 % ophthalmic solution, Place 1 drop into both eyes nightly  levothyroxine (SYNTHROID) 50 MCG tablet, Take 50 mcg by mouth Daily  lisinopril (PRINIVIL;ZESTRIL) 20 MG tablet, Take 20 mg by mouth daily  simvastatin (ZOCOR) 20 MG tablet, Take 20 mg by mouth nightly  amLODIPine (NORVASC) 5 MG tablet, Take 5 mg by mouth daily  aspirin 81 MG chewable tablet, Take 81 mg by mouth daily    Current Medications:     Scheduled Meds:    polyethylene glycol  17 g Oral Daily    amLODIPine  5 mg Oral Daily    docusate sodium  100 mg Oral Daily    senna  1 tablet Oral Nightly    sodium chloride flush  5-40 mL Intravenous 2 times per day    heparin (porcine)  5,000 Units Subcutaneous 3 times per day    latanoprost  1 drop Both Eyes Nightly    levothyroxine  50 mcg Oral Daily    atorvastatin  20 mg Oral Daily     Continuous Infusions:    sodium chloride 100 mL/hr at 05/31/21 0016    sodium chloride       PRN Meds:  HYDROcodone 5 mg - acetaminophen **OR** HYDROcodone 5 mg - acetaminophen, sodium chloride flush, sodium chloride, nicotine, acetaminophen **OR** acetaminophen, sodium chloride flush    Input/Output:       No intake/output data recorded. .      Patient Vitals for the past 96 hrs (Last 3 readings):   Weight   05/30/21 0600 94 lb 9.2 oz (42.9 kg)   05/29/21 0510 99 lb 10.4 oz (45.2 kg)   21 0600 91 lb 4.3 oz (41.4 kg)       Vital Signs:   Temperature:  Temp: 98.6 °F (37 °C)  TMax:   Temp (24hrs), Av.5 °F (36.9 °C), Min:98.3 °F (36.8 °C), Max:98.7 °F (37.1 °C)    Respirations:  Resp: 16  Pulse:   Pulse: 91  BP:    BP: (!) 167/83  BP Range: Systolic (45CZR), WJP:357 , Min:167 , GXO:201       Diastolic (03GQZ), SCF:41, Min:78, Max:85      Physical Examination:     General:  AAO x 3, speaking in full sentences, no accessory muscle use. HEENT: Atraumatic, normocephalic, no throat congestion, moist mucosa. Eyes:   Pupils equal, round and reactive to light, EOMI. Neck:   No JVD, no thyromegaly, no lymphadenopathy. Chest:              Bilateral vesicular breath sounds, no rales or wheezes. Cardiac:  S1 S2 RR, no murmurs, gallops or rubs, JVP not raised. Abdomen: Soft, non-tender, no masses or organomegaly, BS audible. :   No suprapubic or flank tenderness. Neuro:  AAO x 3, No FND. SKIN:  No rashes, good skin turgor. Extremities:  No edema, palpable peripheral pulses, no calf tenderness.     Labs:       Recent Labs     21  0647   WBC 8.0   RBC 2.86*   HGB 8.1*   HCT 27.5*   MCV 96.2   MCH 28.3   MCHC 29.5   RDW 13.4      MPV 9.7      BMP:   Recent Labs     21  0647 21  1124 21  0621    139 139   K 3.7 3.6* 3.5*    103 105   CO2 22 25 21   BUN 25* 25* 24*   CREATININE 1.18* 1.33* 1.27*   GLUCOSE 82 118* 98   CALCIUM 8.6 8.7 8.5*   Albumin:    Recent Labs     21  0647   LABALBU 3.0*     SPEP:  Lab Results   Component Value Date    PROT 6.6 2021    ALBCAL 3.3 2021    ALBPCT 49 2021    LABALPH 0.2 2021    LABALPH 0.9 2021    A1PCT 3 2021    A2PCT 13 2021    LABBETA 2.1 2021    BETAPCT 31 2021    GAMGLOB 0.3 2021    GGPCT 4 2021    PATH PENDING 2021     UPEP:     Lab Results   Component Value Date    LABPE PENDING 2021 Urinalysis/Chemistries:      Lab Results   Component Value Date    NITRU NEGATIVE 05/29/2021    COLORU YELLOW 05/29/2021    PHUR 6.0 05/29/2021    WBCUA 5 TO 10 05/29/2021    RBCUA TOO NUMEROUS TO COUNT 05/29/2021    MUCUS NOT REPORTED 05/29/2021    TRICHOMONAS NOT REPORTED 05/29/2021    YEAST NOT REPORTED 05/29/2021    BACTERIA NOT REPORTED 05/29/2021    SPECGRAV 1.010 05/29/2021    LEUKOCYTESUR NEGATIVE 05/29/2021    UROBILINOGEN Normal 05/29/2021    BILIRUBINUR NEGATIVE 05/29/2021    GLUCOSEU NEGATIVE 05/29/2021    KETUA NEGATIVE 05/29/2021    AMORPHOUS NOT REPORTED 05/29/2021     Urine Sodium:     Lab Results   Component Value Date     05/27/2021     Urine Osmolarity:   Lab Results   Component Value Date    OSMOU 421 05/27/2021       Urine Creatinine:     Lab Results   Component Value Date    LABCREA 33.0 05/29/2021       Radiology:     Renal US  Kidneys:       The right kidney measures 10.0 x 4.0 x 3.5 cm and the left kidney measures   10.5 x 3.3 x 5.2 cm.  Cortical thickness within normal limits bilaterally.       There is diffuse increased renal parenchymal echogenicity bilaterally with   increased prominence of the relative hypoechoic renal pyramids.  A tiny 7 x 5   x 6 mm right lower pole renal cyst is noted.  No other focal renal lesion.       No hydronephrosis in either kidney.  No sonographic evidence for renal   calculi.           Bladder:       Unremarkable appearance of the bladder.  No dilated distal ureters.  The   patient did not have the urge to void.  Bilateral ureteral jets were   identified.           Impression   Diffuse increased renal parenchymal echogenicity bilaterally consistent with   medical renal disease.       No hydronephrosis in either kidney. Assessment:     1. Acute kidney injury secondary to prerenal injury from hypoperfusion related to poor p.o./nonsteroidal anti-inflammatory agent use and concomitant ACE inhibitor -improving.   Creatinine peaked to 1.6  Baseline creatinine normal  She was also on Bactrim prior to hospitalization  2. Nephrotic range proteinuria likely from myeloma  3. L2 fracture with canal compression: Plans for surgery later this week  4. New diagnosis of multiple myeloma  5. Essential hypertension: Not well controlled  6. Hypothyroidism    Plan:   1. Increase Norvasc  2. Avoid nephrotoxic medications  3. Daily Labs  4. Will continue to follow      Nutrition   Please ensure that patient is on a renal diet/TF. Avoid nephrotoxic drugs/contrast exposure. We will continue to follow along with you. Karyle Or CNP  Attending Physician Statement  I have discussed the care of [facility], including pertinent history and exam findings,  with the CNP. I have reviewed the key elements of all parts of the encounter with the CNP. I agree with the assessment, plan and orders as documented.     Lotus Lovell MD MD, MRCP Catana Cassette), FACP   5/31/2021 1:40 PM    Nephrology [address] [place] [address]

## 2021-05-31 NOTE — PLAN OF CARE
Problem: Skin Integrity:  Goal: Will show no infection signs and symptoms  Description: Will show no infection signs and symptoms  5/31/2021 1537 by Davina Hill RN  Outcome: Ongoing     Problem: Skin Integrity:  Goal: Absence of new skin breakdown  Description: Absence of new skin breakdown  5/31/2021 1537 by Davina Hill RN  Outcome: Ongoing     Problem: Falls - Risk of:  Goal: Will remain free from falls  Description: Will remain free from falls  5/31/2021 1537 by Davina Hill RN  Outcome: Ongoing     Problem: Falls - Risk of:  Goal: Absence of physical injury  Description: Absence of physical injury  5/31/2021 1537 by Davina Hill RN  Outcome: Ongoing     Problem: Pain:  Goal: Pain level will decrease  Description: Pain level will decrease  5/31/2021 1537 by Davina Hill RN  Outcome: Ongoing     Problem: Pain:  Goal: Control of acute pain  Description: Control of acute pain  5/31/2021 1537 by Davina Hill RN  Outcome: Ongoing     Problem: Pain:  Goal: Control of chronic pain  Description: Control of chronic pain  5/31/2021 1537 by Davina Hill RN  Outcome: Ongoing     Problem: Nutrition  Goal: Optimal nutrition therapy  5/31/2021 1537 by Davina Hill RN  Outcome: Ongoing

## 2021-06-01 LAB
ABO/RH: NORMAL
ANION GAP SERPL CALCULATED.3IONS-SCNC: 12 MMOL/L (ref 9–17)
ANTIBODY SCREEN: NEGATIVE
ARM BAND NUMBER: NORMAL
BLOOD BANK SPECIMEN: NORMAL
BUN BLDV-MCNC: 21 MG/DL (ref 8–23)
BUN/CREAT BLD: ABNORMAL (ref 9–20)
CALCIUM SERPL-MCNC: 9 MG/DL (ref 8.6–10.4)
CHLORIDE BLD-SCNC: 101 MMOL/L (ref 98–107)
CO2: 26 MMOL/L (ref 20–31)
CREAT SERPL-MCNC: 1.28 MG/DL (ref 0.5–0.9)
EXPIRATION DATE: NORMAL
GFR AFRICAN AMERICAN: 49 ML/MIN
GFR NON-AFRICAN AMERICAN: 41 ML/MIN
GFR SERPL CREATININE-BSD FRML MDRD: ABNORMAL ML/MIN/{1.73_M2}
GFR SERPL CREATININE-BSD FRML MDRD: ABNORMAL ML/MIN/{1.73_M2}
GLUCOSE BLD-MCNC: 93 MG/DL (ref 70–99)
HCT VFR BLD CALC: 28.3 % (ref 36.3–47.1)
HEMOGLOBIN: 8.7 G/DL (ref 11.9–15.1)
MCH RBC QN AUTO: 28.3 PG (ref 25.2–33.5)
MCHC RBC AUTO-ENTMCNC: 30.7 G/DL (ref 28.4–34.8)
MCV RBC AUTO: 92.2 FL (ref 82.6–102.9)
NRBC AUTOMATED: 0 PER 100 WBC
P E INTERPRETATION, U: NORMAL
PATHOLOGIST: NORMAL
PDW BLD-RTO: 13.5 % (ref 11.8–14.4)
PLATELET # BLD: 248 K/UL (ref 138–453)
PMV BLD AUTO: 10.2 FL (ref 8.1–13.5)
POTASSIUM SERPL-SCNC: 3.8 MMOL/L (ref 3.7–5.3)
RBC # BLD: 3.07 M/UL (ref 3.95–5.11)
SODIUM BLD-SCNC: 139 MMOL/L (ref 135–144)
SPECIMEN TYPE: NORMAL
URINE IFX INTERP: NORMAL
URINE IFX SPECIMEN: NORMAL
URINE TOTAL PROTEIN: 180 MG/DL
URINE TOTAL PROTEIN: 180 MG/DL
VOLUME: NORMAL ML
WBC # BLD: 8.3 K/UL (ref 3.5–11.3)

## 2021-06-01 PROCEDURE — 86850 RBC ANTIBODY SCREEN: CPT

## 2021-06-01 PROCEDURE — 6370000000 HC RX 637 (ALT 250 FOR IP): Performed by: STUDENT IN AN ORGANIZED HEALTH CARE EDUCATION/TRAINING PROGRAM

## 2021-06-01 PROCEDURE — 93005 ELECTROCARDIOGRAM TRACING: CPT | Performed by: NURSE PRACTITIONER

## 2021-06-01 PROCEDURE — 99232 SBSQ HOSP IP/OBS MODERATE 35: CPT | Performed by: INTERNAL MEDICINE

## 2021-06-01 PROCEDURE — 6370000000 HC RX 637 (ALT 250 FOR IP): Performed by: INTERNAL MEDICINE

## 2021-06-01 PROCEDURE — APPSS15 APP SPLIT SHARED TIME 0-15 MINUTES: Performed by: NURSE PRACTITIONER

## 2021-06-01 PROCEDURE — 86901 BLOOD TYPING SEROLOGIC RH(D): CPT

## 2021-06-01 PROCEDURE — 80048 BASIC METABOLIC PNL TOTAL CA: CPT

## 2021-06-01 PROCEDURE — 2580000003 HC RX 258: Performed by: INTERNAL MEDICINE

## 2021-06-01 PROCEDURE — 99232 SBSQ HOSP IP/OBS MODERATE 35: CPT | Performed by: NEUROLOGICAL SURGERY

## 2021-06-01 PROCEDURE — 86900 BLOOD TYPING SEROLOGIC ABO: CPT

## 2021-06-01 PROCEDURE — 6360000002 HC RX W HCPCS: Performed by: INTERNAL MEDICINE

## 2021-06-01 PROCEDURE — 36415 COLL VENOUS BLD VENIPUNCTURE: CPT

## 2021-06-01 PROCEDURE — 1200000000 HC SEMI PRIVATE

## 2021-06-01 PROCEDURE — 6370000000 HC RX 637 (ALT 250 FOR IP): Performed by: NURSE PRACTITIONER

## 2021-06-01 PROCEDURE — 85027 COMPLETE CBC AUTOMATED: CPT

## 2021-06-01 PROCEDURE — 51798 US URINE CAPACITY MEASURE: CPT

## 2021-06-01 RX ORDER — BISACODYL 10 MG
10 SUPPOSITORY, RECTAL RECTAL DAILY PRN
Status: DISCONTINUED | OUTPATIENT
Start: 2021-06-01 | End: 2021-06-10 | Stop reason: HOSPADM

## 2021-06-01 RX ORDER — METOPROLOL TARTRATE 5 MG/5ML
5 INJECTION INTRAVENOUS EVERY 6 HOURS PRN
Status: DISCONTINUED | OUTPATIENT
Start: 2021-06-01 | End: 2021-06-10 | Stop reason: HOSPADM

## 2021-06-01 RX ADMIN — LEVOTHYROXINE SODIUM 50 MCG: 50 TABLET ORAL at 05:37

## 2021-06-01 RX ADMIN — POLYETHYLENE GLYCOL 3350 17 G: 17 POWDER, FOR SOLUTION ORAL at 08:27

## 2021-06-01 RX ADMIN — BISACODYL 10 MG: 10 SUPPOSITORY RECTAL at 04:17

## 2021-06-01 RX ADMIN — OXYCODONE HYDROCHLORIDE AND ACETAMINOPHEN 2 TABLET: 5; 325 TABLET ORAL at 12:59

## 2021-06-01 RX ADMIN — SODIUM CHLORIDE, PRESERVATIVE FREE 10 ML: 5 INJECTION INTRAVENOUS at 08:31

## 2021-06-01 RX ADMIN — TRAMADOL HYDROCHLORIDE 50 MG: 50 TABLET, COATED ORAL at 22:12

## 2021-06-01 RX ADMIN — OXYCODONE HYDROCHLORIDE AND ACETAMINOPHEN 2 TABLET: 5; 325 TABLET ORAL at 06:25

## 2021-06-01 RX ADMIN — MAGNESIUM HYDROXIDE 30 ML: 400 SUSPENSION ORAL at 02:49

## 2021-06-01 RX ADMIN — DOCUSATE SODIUM 200 MG: 100 CAPSULE ORAL at 08:27

## 2021-06-01 RX ADMIN — DOCUSATE SODIUM 50MG AND SENNOSIDES 8.6MG 2 TABLET: 8.6; 5 TABLET, FILM COATED ORAL at 08:27

## 2021-06-01 RX ADMIN — AMLODIPINE BESYLATE 10 MG: 10 TABLET ORAL at 08:27

## 2021-06-01 RX ADMIN — HEPARIN SODIUM 5000 UNITS: 5000 INJECTION INTRAVENOUS; SUBCUTANEOUS at 22:06

## 2021-06-01 RX ADMIN — ATORVASTATIN CALCIUM 20 MG: 20 TABLET, FILM COATED ORAL at 18:49

## 2021-06-01 RX ADMIN — OXYCODONE HYDROCHLORIDE AND ACETAMINOPHEN 2 TABLET: 5; 325 TABLET ORAL at 02:30

## 2021-06-01 RX ADMIN — SODIUM CHLORIDE, PRESERVATIVE FREE 10 ML: 5 INJECTION INTRAVENOUS at 22:06

## 2021-06-01 RX ADMIN — DOCUSATE SODIUM 200 MG: 100 CAPSULE ORAL at 22:05

## 2021-06-01 ASSESSMENT — PAIN SCALES - GENERAL
PAINLEVEL_OUTOF10: 7
PAINLEVEL_OUTOF10: 7
PAINLEVEL_OUTOF10: 8
PAINLEVEL_OUTOF10: 8

## 2021-06-01 ASSESSMENT — ENCOUNTER SYMPTOMS
COUGH: 0
VOMITING: 0
CONSTIPATION: 1
SHORTNESS OF BREATH: 0
NAUSEA: 0
BACK PAIN: 1
ABDOMINAL PAIN: 0

## 2021-06-01 NOTE — PROGRESS NOTES
[facility]    Office: [address], DO, Stumeeta René, DO, Ami Weiss, DO, Meghna Bismark Campbell, DO, Maribel Paniagua MD, Irving Roy MD, Mila Kearns MD, Sally Franklin MD, Lori Ley MD, Marian Contreras MD, Ama Alexander MD, Verena Melgar MD, Román Banuelos MD, Rachana Collins DO, Dashawn Owens MD, Deneen Mcadams MD, Blaire Larson, DO, Tiera Urban MD,  Sam Rouse DO, Shanique Hair MD, [place] MD, Eladio Wilhelm, [facility], [address], CNP, Elliot Cruz, CNP, Crystal Arnold, CNS, Aurea Dao, CNP, Hugo Maravilla, CNP, Cecily Yanez, CNP, Mitch Jose, CNP, Mare Quinteros, CNP, Thai Carlson PA-C, Josy Santana, [facility], Tara Arechiga, CNP, Segun Antonio, CNP, Suzi Galaviz, CNP, Lacinda Frankel, CNP, Debi Orr, CNP    [address]    Progress Note    6/1/2021    5:11 PM    Name:   Epi Collier  MRN:     [id number]     Acct:      [de-identified]   Room:   [address] [place] [address] Day:  5  Admit Date:  5/27/2021  2:18 AM    PCP:   No primary care provider on file. Code Status:  Full Code    Subjective:     C/C:   Back pain    Interval History Status:  Comfortable  Pain rated 7/10  Not using brace  Still feels somewhat constipated    Data Base Updates:  BP has been elevated    WBC8.3k/uL RBC3.07Low m/uL Hemoglobin8. 7Low     Lqxxdwc31xd/dL     CTJ65fb/dL     CREATININE1. 28High       Brief History:     As documented in the medical record: \"This is a 42-year-old female who initially presented to the hospital with complaints of increasing back pain. Initial MRI did show burst L2 fracture with 6 mm retropulsion of the spinal canal with moderate canal narrowing along with lucency in the right side. Patient was transferred for neurosurgery evaluation. MRI was concerning for pathologic fracture. IgA level 2250, kappa lambda ratio 0.01 and of work-up pending. Tentative plan for OR this wednesday per Neurosurgery. The plan has included:  1. Pathologic fracture of lumbar Vertebra: Neurosurgery consulted, plan for OR early next week. Continue PT/OT. TLSO brace ordered. Hold ASA,  2. Workup for MM in progress. Kappa/Lambda 0.97/136. Immunoglobulin panel: IGA: 2250. Oncology consulted, need tissue diagnosis. 3. Protein calorie malnutrition: liberalize diet, High calorie supplement. 4. Nephrotic range proteinuria: increase norvasc 10 mg . Add ACE/ARB when AMERICO resolves  5. Monitor Hgb, transfuse prn for symptomatic anemia or Hgb <7  6. Continue Lipitor 20 mg daily. 7. Continue DVT ppx.  8. Continue Synthroid 50 mcg daily. 9. Risk stratification: patient is low/intermediate risk for surgery. \"     Immunofixation urine random profile [[id number]] Collected: 05/29/21 0520 Updated: 06/01/21 0955 Urine IFX Specimen. URINE VolumeNOT REPORTEDmL Urine Total Natwgiw311ck/dL Urine IFX InterpMONOCLONAL IMMUNOGLOBULIN IS PRESENT AND IDENTIFIED AS   Comment: IGA LAMBDA (1.4 MG/DL). FREE MONOCLONAL LIGHT CHAINS ARE PRESENT, IDENTIFIED AS   LAMBDA (142.2 MG/DL). Results for Veto Calabrese (MRN [id number]) as of 6/1/2021 17:01   Ref. Range 5/27/2021 09:44 5/27/2021 14:12 5/28/2021 05:41 5/28/2021 07:36 5/28/2021 21:12 5/29/2021 06:47 5/30/2021 11:24 5/31/2021 06:21 6/1/2021 06:14   Creatinine Latest Ref Range: 0.50 - 0.90 mg/dL 1.53 (H)  1.61 (H)   1.18 (H) 1.33 (H) 1.27 (H) 1.28 (H)     Medications:      Allergies:  No Known Allergies    Current Meds:   Scheduled Meds:    polyethylene glycol  17 g Oral Daily    amLODIPine  10 mg Oral Daily    docusate sodium  200 mg Oral BID    sennosides-docusate sodium  2 tablet Oral Daily    sodium chloride flush  5-40 mL Intravenous 2 times per day    heparin (porcine)  5,000 Units Subcutaneous 3 times per day    latanoprost  1 drop Both Eyes Nightly    levothyroxine  50 mcg Oral Daily    atorvastatin  20 mg Oral Daily     Continuous Infusions:    sodium chloride       PRN Meds: at L2 is redemonstrated (previously seen on MRI dated 05/25/2021). Ribs appear mildly heterogeneous. CT LUMBAR SPINE WO CONTRAST    Result Date: 5/30/2021  Redemonstration of a pathologic compression fracture of L2 with extraosseous extension resulting in canal stenosis as well as bilateral L2-3 foraminal narrowing. This is better evaluated on the recent MRI examination. Multilevel degenerative change with degenerative bilateral foraminal narrowing at L5-S1. Small bibasilar effusions with adjacent infiltrates. MRI CERVICAL SPINE W WO CONTRAST    Result Date: 5/27/2021  No evidence of metastatic disease inside the spinal canal. C2 and C3 vertebral body demonstrate fat marrow signal and remainder of the cervical vertebral bodies have diffuse T1 and T2 hypointensity. C2 and C3 may have been exposed to prior radiation. The appearance of the remainder of vertebral bodies may be related to anemia or other diffuse marrow abnormalities. Anterior aspect of C4 has enhancing lesion. Finding may be related to atypical hemangioma or marrow infiltration. At C5-C6, moderate canal stenosis and severe bilateral neural foraminal narrowing. Left foraminal disc protrusion/osteophytic ridging impinges on the exiting left C6 nerve root. Other mild to moderate degenerative changes of cervical spine as described. MRI THORACIC SPINE W WO CONTRAST    Addendum Date: 5/27/2021    ADDENDUM: Critical results were called by Dr. Bettina Arnold MD to [facility] on 5/27/2021 at 19:26. The reported infiltrative mass lesion within the upper lumbar spine may also be related to hematoma . Result Date: 5/27/2021  Discrete enhancing lesions involving the spinous processes of the vertebral bodies of T4, T6 and T7 and anterior aspect of vertebral body of T4 concerning for metastatic disease. Diffuse T1 and T2 hypointensity of the marrow which may be effect of anemia or chemotherapy.  Only partially included on the acquired images there is a diffuse infiltrative mass lesion surrounding the anterior and lateral aspect of L1 vertebral body. Clinical correlation and if appropriate contrast enhanced CT of abdomen and pelvis is recommended. The findings were sent to the Radiology Results [address] at 7:10 pm on 5/27/2021to be communicated to a licensed caregiver. US RENAL COMPLETE    Result Date: 5/27/2021  Diffuse increased renal parenchymal echogenicity bilaterally consistent with medical renal disease. No hydronephrosis in either kidney. 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  *      Plan:        Neurosurgical evaluation in progress  Surgery planned for Thursday  Pain management  Blood Pressure - Monitor and control   Lopressor as needed  Hematology evaluation and follow-up as scheduled  Nephrology evaluation follow-up as scheduled  Check bun and creatinine   Nutritional supplementation  Laxatives as needed    DVT prophylaxis    IP CONSULT TO NEUROSURGERY  IP CONSULT TO HEM/ONC  IP CONSULT TO DIETITIAN  INPATIENT CONSULT TO ORTHOTIST/PROSTHETIST  IP CONSULT TO NEPHROLOGY  IP CONSULT TO IV TEAM    Yash Morifn DO  6/1/2021  5:11 PM

## 2021-06-01 NOTE — PROGRESS NOTES
Renal Progress Note    Patient :  Kalina Chaudhary; 77 y.o. MRN# [id number]  Location:  9509/2292-83  Attending:  Franco Salguero DO  Admit Date:  5/27/2021   Hospital Day: 5      Subjective:     Patient was seen and examined. Patient was hemodynamically stable. She was lying flat. She was alert and awake. Her pain was under fair control. Improvement in creatinine noted. Her lisinopril was discontinued and now she is on calcium channel blocker. Her systolic blood pressure is still slightly elevated and 150, partly due to pain. Patient is for surgery on coming Thursday.       Outpatient Medications:     Medications Prior to Admission: naproxen (NAPROSYN) 500 MG tablet, Take 500 mg by mouth 2 times daily (with meals)  latanoprost (XALATAN) 0.005 % ophthalmic solution, Place 1 drop into both eyes nightly  levothyroxine (SYNTHROID) 50 MCG tablet, Take 50 mcg by mouth Daily  lisinopril (PRINIVIL;ZESTRIL) 20 MG tablet, Take 20 mg by mouth daily  simvastatin (ZOCOR) 20 MG tablet, Take 20 mg by mouth nightly  amLODIPine (NORVASC) 5 MG tablet, Take 5 mg by mouth daily  aspirin 81 MG chewable tablet, Take 81 mg by mouth daily    Current Medications:     Scheduled Meds:    polyethylene glycol  17 g Oral Daily    amLODIPine  10 mg Oral Daily    docusate sodium  200 mg Oral BID    sennosides-docusate sodium  2 tablet Oral Daily    sodium chloride flush  5-40 mL Intravenous 2 times per day    heparin (porcine)  5,000 Units Subcutaneous 3 times per day    latanoprost  1 drop Both Eyes Nightly    levothyroxine  50 mcg Oral Daily    atorvastatin  20 mg Oral Daily     Continuous Infusions:    sodium chloride       PRN Meds:  magnesium hydroxide, bisacodyl, oxyCODONE-acetaminophen **OR** oxyCODONE-acetaminophen, morphine, traMADol, sodium chloride flush, sodium chloride, nicotine, acetaminophen **OR** acetaminophen, sodium chloride flush    Input/Output:       I/O last 3 completed shifts:  In: -   Out: 550 [Urine:550]. Patient Vitals for the past 96 hrs (Last 3 readings):   Weight   21 0538 92 lb 9.5 oz (42 kg)   21 0600 94 lb 9.2 oz (42.9 kg)   21 0510 99 lb 10.4 oz (45.2 kg)       Vital Signs:   Temperature:  Temp: 97.4 °F (36.3 °C)  TMax:   Temp (24hrs), Av.2 °F (36.8 °C), Min:97.4 °F (36.3 °C), Max:99 °F (37.2 °C)    Respirations:  Resp: 16  Pulse:   Pulse: 94  BP:    BP: (!) 150/76  BP Range: Systolic (93AKQ), TWE:212 , Min:150 , FLY:565       Diastolic (68CWJ), CEX:22, Min:76, Max:88      Physical Examination:     General:  Alert and oriented x3  HEENT: Atraumatic  Eyes:   Pupils were reactive to light. Neck:   No JVD or lymphadenopathy  Chest:              Bilateral air entry and clear  cardiac:  S1 and S2 audible no S3   Abdomen: Soft and nontender bowel sounds are positive. :   No suprapubic or flank tenderness. Neuro:  AAO x 3, No FND. SKIN:  No rashes, good skin turgor. Extremities:  Trace edema    Labs:       Recent Labs     21  0614   WBC 8.3   RBC 3.07*   HGB 8.7*   HCT 28.3*   MCV 92.2   MCH 28.3   MCHC 30.7   RDW 13.5      MPV 10.2      BMP:   Recent Labs     21  1124 21  0621 21  0614    139 139   K 3.6* 3.5* 3.8    105 101   CO2 25 21 26   BUN 25* 24* 21   CREATININE 1.33* 1.27* 1.28*   GLUCOSE 118* 98 93   CALCIUM 8.7 8.5* 9.0   Albumin:    No results for input(s): LABALBU in the last 72 hours. SPEP:  Lab Results   Component Value Date    PROT 6.6 2021    ALBCAL 3.3 2021    ALBPCT 49 2021    LABALPH 0.2 2021    LABALPH 0.9 2021    A1PCT 3 2021    A2PCT 13 2021    LABBETA 2.1 2021    BETAPCT 31 2021    GAMGLOB 0.3 2021    GGPCT 4 2021    PATH ELECTRONICALLY SIGNED.  Karla Garber M.D. 2021     UPEP:     Lab Results   Component Value Date    LABPE  2021     MONOCLONAL IMMUNOGLOBULIN IS PRESENT AND IDENTIFIED AS         Urinalysis/Chemistries: Lab Results   Component Value Date    NITRU NEGATIVE 05/29/2021    COLORU YELLOW 05/29/2021    PHUR 6.0 05/29/2021    WBCUA 5 TO 10 05/29/2021    RBCUA TOO NUMEROUS TO COUNT 05/29/2021    MUCUS NOT REPORTED 05/29/2021    TRICHOMONAS NOT REPORTED 05/29/2021    YEAST NOT REPORTED 05/29/2021    BACTERIA NOT REPORTED 05/29/2021    SPECGRAV 1.010 05/29/2021    LEUKOCYTESUR NEGATIVE 05/29/2021    UROBILINOGEN Normal 05/29/2021    BILIRUBINUR NEGATIVE 05/29/2021    GLUCOSEU NEGATIVE 05/29/2021    KETUA NEGATIVE 05/29/2021    AMORPHOUS NOT REPORTED 05/29/2021     Urine Sodium:     Lab Results   Component Value Date     05/27/2021     Urine Osmolarity:   Lab Results   Component Value Date    OSMOU 421 05/27/2021       Urine Creatinine:     Lab Results   Component Value Date    LABCREA 33.0 05/29/2021       Radiology:     Renal US  Kidneys:       The right kidney measures 10.0 x 4.0 x 3.5 cm and the left kidney measures   10.5 x 3.3 x 5.2 cm.  Cortical thickness within normal limits bilaterally.       There is diffuse increased renal parenchymal echogenicity bilaterally with   increased prominence of the relative hypoechoic renal pyramids.  A tiny 7 x 5   x 6 mm right lower pole renal cyst is noted.  No other focal renal lesion.       No hydronephrosis in either kidney.  No sonographic evidence for renal   calculi.           Bladder:       Unremarkable appearance of the bladder.  No dilated distal ureters.  The   patient did not have the urge to void.  Bilateral ureteral jets were   identified.           Impression   Diffuse increased renal parenchymal echogenicity bilaterally consistent with   medical renal disease.       No hydronephrosis in either kidney. Assessment:     1. Acute kidney injury slowly improving creatinine is down to 1.2 mg/dL. 2.  Nephrotic range proteinuria likely from myeloma protein. 3. L2 fracture with canal compression: Plan for surgery on Thursday  4.   New diagnosis of multiple myeloma  5. Essential hypertension: Not well controlled  6. Hypothyroidism    Plan:   1. No changes in medications  2. BMP in a.m.  3.  We will follow with you    Nutrition   Please ensure that patient is on a renal diet/TF. Avoid nephrotoxic drugs/contrast exposure. We will continue to follow along with you.        Toshia Thurman MD   6/1/2021 12:48 PM    Nephrology [address] [place] [address] Drive

## 2021-06-01 NOTE — PLAN OF CARE
Problem: Skin Integrity:  Goal: Will show no infection signs and symptoms  Description: Will show no infection signs and symptoms  6/1/2021 1516 by Lyn Nettles RN  Outcome: Ongoing  6/1/2021 0137 by Ho Mcclellan RN  Outcome: Ongoing  Goal: Absence of new skin breakdown  Description: Absence of new skin breakdown  6/1/2021 1516 by Lyn Nettles RN  Outcome: Ongoing  6/1/2021 0137 by Ho Mcclellan RN  Outcome: Ongoing     Problem: Falls - Risk of:  Goal: Will remain free from falls  Description: Will remain free from falls  6/1/2021 1516 by Lyn Nettles RN  Outcome: Ongoing  6/1/2021 0137 by Ho Mcclellan RN  Outcome: Ongoing  Goal: Absence of physical injury  Description: Absence of physical injury  6/1/2021 1516 by Lyn Nettles RN  Outcome: Ongoing  6/1/2021 0137 by Ho Mcclellan RN  Outcome: Ongoing     Problem: Pain:  Goal: Pain level will decrease  Description: Pain level will decrease  6/1/2021 1516 by Lyn Nettles RN  Outcome: Ongoing  6/1/2021 0137 by Ho Mcclellan RN  Outcome: Ongoing  Goal: Control of acute pain  Description: Control of acute pain  6/1/2021 1516 by Lyn Nettles RN  Outcome: Ongoing  6/1/2021 0137 by Ho Mcclellan RN  Outcome: Ongoing  Goal: Control of chronic pain  Description: Control of chronic pain  6/1/2021 1516 by Lyn Nettles RN  Outcome: Ongoing  6/1/2021 0137 by Ho Mcclellan RN  Outcome: Ongoing

## 2021-06-01 NOTE — PLAN OF CARE
Problem: Pain:  Goal: Pain level will decrease  Description: Pain level will decrease  6/1/2021 0137 by Steve Cervantes RN  Outcome: Ongoing  5/31/2021 1537 by Yamilka Saleh RN  Outcome: Ongoing  Goal: Control of acute pain  Description: Control of acute pain  6/1/2021 0137 by Steve Cervantes RN  Outcome: Ongoing  5/31/2021 1537 by Yamilka Saleh RN  Outcome: Ongoing  Goal: Control of chronic pain  Description: Control of chronic pain  6/1/2021 0137 by Steve Cervantes RN  Outcome: Ongoing  5/31/2021 1537 by Yamilka Saleh RN  Outcome: Ongoing

## 2021-06-01 NOTE — PROGRESS NOTES
Facility-Administered Medications   Medication Dose Route Frequency Provider Last Rate Last Admin    magnesium hydroxide (MILK OF MAGNESIA) 400 MG/5ML suspension 30 mL  30 mL Oral Daily PRN Jaqueline Fisher, DO   30 mL at 06/01/21 0249    bisacodyl (DULCOLAX) suppository 10 mg  10 mg Rectal Daily PRN Christiano Matos MD   10 mg at 06/01/21 0417    polyethylene glycol (GLYCOLAX) packet 17 g  17 g Oral Daily Angelikad I Natalia, DO   17 g at 06/01/21 0827    amLODIPine (NORVASC) tablet 10 mg  10 mg Oral Daily Danyel Rosie MD Garret   10 mg at 06/01/21 0827    docusate sodium (COLACE) capsule 200 mg  200 mg Oral BID Shelton Vargas MD   200 mg at 06/01/21 0827    sennosides-docusate sodium (SENOKOT-S) 8.6-50 MG tablet 2 tablet  2 tablet Oral Daily Shelton Vargas MD   2 tablet at 06/01/21 0827    oxyCODONE-acetaminophen (PERCOCET) 5-325 MG per tablet 1 tablet  1 tablet Oral Q4H PRN Shelton Vargas MD        Or    oxyCODONE-acetaminophen (PERCOCET) 5-325 MG per tablet 2 tablet  2 tablet Oral Q4H PRN Shelton Vargas MD   2 tablet at 06/01/21 0625    morphine (PF) injection 2 mg  2 mg Intravenous Q4H PRN Shelton Vargas MD   2 mg at 05/31/21 2134    traMADol (ULTRAM) tablet 50 mg  50 mg Oral Q6H PRN Shelton Vargas MD        sodium chloride flush 0.9 % injection 5-40 mL  5-40 mL Intravenous 2 times per day Angela Adame MD   10 mL at 06/01/21 0831    sodium chloride flush 0.9 % injection 5-40 mL  5-40 mL Intravenous PRN Angela Adame MD        0.9 % sodium chloride infusion  25 mL Intravenous PRN Angela Adame MD        nicotine (NICODERM CQ) 21 MG/24HR 1 patch  1 patch Transdermal Daily PRN Angela Adame MD        acetaminophen (TYLENOL) tablet 650 mg  650 mg Oral Q6H PRN Angela Adame MD   650 mg at 05/27/21 2147    Or    acetaminophen (TYLENOL) suppository 650 mg  650 mg Rectal Q6H PRN Angela Adame MD        heparin (porcine) injection 5,000 Units  5,000 Units Subcutaneous 3 times per day Angela Adame MD  latanoprost (XALATAN) 0.005 % ophthalmic solution 1 drop  1 drop Both Eyes Nightly Lanice Maudlin, APRN - NP   1 drop at 05/31/21 2134    levothyroxine (SYNTHROID) tablet 50 mcg  50 mcg Oral Daily Lanice Maudlin, APRN - NP   50 mcg at 06/01/21 0537    atorvastatin (LIPITOR) tablet 20 mg  20 mg Oral Daily Lanice Maudlin, APRN - NP   20 mg at 05/31/21 2134    sodium chloride flush 0.9 % injection 10 mL  10 mL Intravenous PRN Deneen Machuca DO           Allergies:  Patient has no known allergies. Social History:   reports that she has never smoked. She has never used smokeless tobacco. She reports previous alcohol use. She reports that she does not use drugs. Family History: family history includes No Known Problems in her father and mother. REVIEW OF SYSTEMS:      Constitutional: No fever or chills. No night sweats, no weight loss   Eyes: No eye discharge, double vision, or eye pain   HEENT: negative for sore mouth, sore throat, hoarseness and voice change   Respiratory: negative for cough , sputum, dyspnea, wheezing, hemoptysis, chest pain   Cardiovascular: negative for chest pain, dyspnea, palpitations, orthopnea, PND   Gastrointestinal: negative for nausea, vomiting, diarrhea, constipation, abdominal pain, Dysphagia, hematemesis and hematochezia   Genitourinary: negative for frequency, dysuria, nocturia, urinary incontinence, and hematuria   Integument: negative for rash, skin lesions, bruises.    Hematologic/Lymphatic: negative for easy bruising, bleeding, lymphadenopathy, or petechiae   Endocrine: negative for heat or cold intolerance,weight changes, change in bowel habits and hair loss   Musculoskeletal: negative for myalgias, arthralgias, pain, joint swelling,and bone pain   Neurological: negative for headaches, dizziness, seizures, weakness, numbness    PHYSICAL EXAM:        BP (!) 150/76   Pulse 94   Temp 97.4 °F (36.3 °C) (Oral)   Resp 16   Ht 5' (1.524 m)   Wt 92 lb 9.5 oz (42 kg)   SpO2 91%   BMI 18.08 kg/m²    Temp (24hrs), Av.1 °F (36.7 °C), Min:97.4 °F (36.3 °C), Max:99 °F (37.2 °C)      General appearance - well appearing, no in pain or distress   Mental status - alert and cooperative   Eyes - pupils equal and reactive, extraocular eye movements intact   Ears - bilateral TM's and external ear canals normal   Mouth - mucous membranes moist, pharynx normal without lesions   Neck - supple, no significant adenopathy   Lymphatics - no palpable lymphadenopathy, no hepatosplenomegaly   Chest - clear to auscultation, no wheezes, rales or rhonchi, symmetric air entry   Heart - normal rate, regular rhythm, normal S1, S2, no murmurs  Abdomen - soft, nontender, nondistended, no masses or organomegaly   Neurological -alert and oriented. No gross motor or sensory deficit  Musculoskeletal -lumbar spinal tenderness present  Extremities - peripheral pulses normal, no pedal edema, no clubbing or cyanosis   Skin - normal coloration and turgor, no rashes, no suspicious skin lesions noted ,      DATA:      Labs:     Results for orders placed or performed during the hospital encounter of 21   Urinalysis with microscopic   Result Value Ref Range    Color, UA ORANGE (A) YELLOW    Turbidity UA CLEAR CLEAR    Glucose, Ur NEGATIVE NEGATIVE    Bilirubin Urine NEGATIVE NEGATIVE    Ketones, Urine NEGATIVE NEGATIVE    Specific Gravity, UA 1.014 1.005 - 1.030    Urine Hgb LARGE (A) NEGATIVE    pH, UA 7.0 5.0 - 8.0    Protein, UA 2+ (A) NEGATIVE    Urobilinogen, Urine Normal Normal    Nitrite, Urine NEGATIVE NEGATIVE    Leukocyte Esterase, Urine TRACE (A) NEGATIVE    -          WBC, UA 10 TO 20 0 - 5 /HPF    RBC, UA TOO NUMEROUS TO COUNT 0 - 4 /HPF    Casts UA  0 - 8 /LPF     2 TO 5 HYALINE Reference range defined for non-centrifuged specimen.     Crystals, UA NOT REPORTED None /HPF    Epithelial Cells UA 0 TO 2 0 - 5 /HPF    Renal Epithelial, UA NOT REPORTED 0 /HPF    Bacteria, UA NOT REPORTED None    Mucus, UA NOT Range    Iron 75 37 - 145 ug/dL    TIBC 215 (L) 250 - 450 ug/dL    Iron Saturation 35 20 - 55 %    UIBC 140 112 - 347 ug/dL   Ferritin   Result Value Ref Range    Ferritin 292 (H) 13 - 150 ug/L   Reticulocytes   Result Value Ref Range    Retic % 1.6 0.5 - 1.9 %    Absolute Retic # 0.050 0.030 - 0.080 M/uL    Immature Retic Fract 12.400 2.7 - 18.3 %    Retic Hemoglobin 30.8 28.2 - 35.7 pg   TSH with Reflex   Result Value Ref Range    TSH 7.01 (H) 0.30 - 5.00 mIU/L   Electrophoresis Protein, Serum without Reflex to Immunofixation   Result Value Ref Range    Total Protein 6.8 6.4 - 8.3 g/dL    Albumin (calculated) 3.3 3.2 - 5.2 g/dL    Albumin % 49 45 - 65 %    Alpha-1-Globulin 0.2 0.1 - 0.4 g/dL    Alpha 1 % 3 3 - 6 %    Alpha-2-Globulin 0.9 0.5 - 0.9 g/dL    Alpha 2 % 13 6 - 13 %    Beta Globulin 2.1 (H) 0.5 - 1.1 g/dL    Beta Percent 31 (H) 11 - 19 %    Gamma Globulin 0.3 (L) 0.5 - 1.5 g/dL    Gamma Globulin % 4 (L) 9 - 20 %    Total Prot. Sum 6.8 6.3 - 8.2 g/dL    Total Prot. Sum,% 100 98 - 102 %    Protein Electrophoresis, Serum       A ZONE OF RESTRICTION IS PRESENT IN THE BETA GLOBULIN REGION. CONFIRMED BY IMMUNOFIXATION TO BE MONOCLONAL    Pathologist ELECTRONICALLY SIGNED. Elva Robison M.D. IMMUNOFIXATION SERUM PROFILE   Result Value Ref Range    Serum IFX Interp       A ZONE OF RESTRICTION IS PRESENT IN THE BETA GLOBULIN REGION. CONFIRMED BY IMMUNOFIXATION TO BE MONOCLONAL    Pathologist ELECTRONICALLY SIGNED.  Elva Robison M.D.    KAPPA/LAMBDA QUANT FREE LIGHT CHAINS SERUM   Result Value Ref Range    Kappa Free Light Chains QNT 0.97 0.37 - 1.94 mg/dL    Lambda Free Light Chains .34 (H) 0.57 - 2.63 mg/dL    Free Kappa/Lambda Ratio 0.01 (L) 0.26 - 1.65   IMMUNOGLOBULINS PANEL   Result Value Ref Range    IgG <300 (L) 700 - 1600 mg/dL    IgA 2250 (H) 70 - 400 mg/dL    IgM <25 (L) 40 - 230 mg/dL   T4, Free   Result Value Ref Range    Thyroxine, Free 1.35 0.93 - 1.70 ng/dL   PROTEIN ELECTROPHORESIS, URINE   Result Value Ref Range    Specimen Type . URINE     Urine Total Protein 180 mg/dL    P E Interpretation, U       MONOCLONAL IMMUNOGLOBULIN IS PRESENT AND IDENTIFIED AS    Pathologist ELECTRONICALLY SIGNED.  Albert Carrera M.D.    CBC   Result Value Ref Range    WBC 7.6 3.5 - 11.3 k/uL    RBC 3.08 (L) 3.95 - 5.11 m/uL    Hemoglobin 8.7 (L) 11.9 - 15.1 g/dL    Hematocrit 29.5 (L) 36.3 - 47.1 %    MCV 95.8 82.6 - 102.9 fL    MCH 28.2 25.2 - 33.5 pg    MCHC 29.5 28.4 - 34.8 g/dL    RDW 13.3 11.8 - 14.4 %    Platelets 481 810 - 632 k/uL    MPV 9.4 8.1 - 13.5 fL    NRBC Automated 0.0 0.0 per 100 WBC   Comprehensive Metabolic Panel w/ Reflex to MG   Result Value Ref Range    Glucose 83 70 - 99 mg/dL    BUN 25 (H) 8 - 23 mg/dL    CREATININE 1.61 (H) 0.50 - 0.90 mg/dL    Bun/Cre Ratio NOT REPORTED 9 - 20    Calcium 8.5 (L) 8.6 - 10.4 mg/dL    Sodium 140 135 - 144 mmol/L    Potassium 3.9 3.7 - 5.3 mmol/L    Chloride 107 98 - 107 mmol/L    CO2 23 20 - 31 mmol/L    Anion Gap 10 9 - 17 mmol/L    Alkaline Phosphatase 47 35 - 104 U/L    ALT 9 5 - 33 U/L    AST 15 <32 U/L    Total Bilirubin 0.20 (L) 0.3 - 1.2 mg/dL    Total Protein 6.8 6.4 - 8.3 g/dL    Albumin 3.0 (L) 3.5 - 5.2 g/dL    Albumin/Globulin Ratio 0.8 (L) 1.0 - 2.5    GFR Non- 31 (L) >60 mL/min    GFR  38 (L) >60 mL/min    GFR Comment          GFR Staging NOT REPORTED    BETA 2 MICROGLOBULIN, SERUM   Result Value Ref Range    Beta-2 Microglobulin 6.9 (H) 0.6 - 2.4 mg/L   Protein / Creatinine Ratio, Urine   Result Value Ref Range    Total Protein, Urine 180 mg/dL    Creatinine, Ur 30.5 28.0 - 217.0 mg/dL    Urine Total Protein Creatinine Ratio 5.90 (H) 0.00 - 0.20   URINALYSIS WITH MICROSCOPIC   Result Value Ref Range    Color, UA YELLOW YELLOW    Turbidity UA CLEAR CLEAR    Glucose, Ur NEGATIVE NEGATIVE    Bilirubin Urine NEGATIVE NEGATIVE    Ketones, Urine NEGATIVE NEGATIVE    Specific Gravity, UA 1.010 1.005 - 1.030    Urine Hgb LARGE (A) NEGATIVE    pH, UA 6.0 5.0 - 8.0    Protein, UA 2+ (A) NEGATIVE    Urobilinogen, Urine Normal Normal    Nitrite, Urine NEGATIVE NEGATIVE    Leukocyte Esterase, Urine NEGATIVE NEGATIVE    -          WBC, UA 5 TO 10 0 - 5 /HPF    RBC, UA TOO NUMEROUS TO COUNT 0 - 4 /HPF    Casts UA  0 - 8 /LPF     2 TO 5 HYALINE Reference range defined for non-centrifuged specimen. Crystals, UA NOT REPORTED None /HPF    Epithelial Cells UA 0 TO 2 0 - 5 /HPF    Renal Epithelial, UA NOT REPORTED 0 /HPF    Bacteria, UA NOT REPORTED None    Mucus, UA NOT REPORTED None    Trichomonas, UA NOT REPORTED None    Amorphous, UA NOT REPORTED None    Other Observations UA NOT REPORTED NOT REQ.     Yeast, UA NOT REPORTED None   CBC   Result Value Ref Range    WBC 8.0 3.5 - 11.3 k/uL    RBC 2.86 (L) 3.95 - 5.11 m/uL    Hemoglobin 8.1 (L) 11.9 - 15.1 g/dL    Hematocrit 27.5 (L) 36.3 - 47.1 %    MCV 96.2 82.6 - 102.9 fL    MCH 28.3 25.2 - 33.5 pg    MCHC 29.5 28.4 - 34.8 g/dL    RDW 13.4 11.8 - 14.4 %    Platelets 493 191 - 577 k/uL    MPV 9.7 8.1 - 13.5 fL    NRBC Automated 0.0 0.0 per 100 WBC   Comprehensive Metabolic Panel w/ Reflex to MG   Result Value Ref Range    Glucose 82 70 - 99 mg/dL    BUN 25 (H) 8 - 23 mg/dL    CREATININE 1.18 (H) 0.50 - 0.90 mg/dL    Bun/Cre Ratio NOT REPORTED 9 - 20    Calcium 8.6 8.6 - 10.4 mg/dL    Sodium 136 135 - 144 mmol/L    Potassium 3.7 3.7 - 5.3 mmol/L    Chloride 105 98 - 107 mmol/L    CO2 22 20 - 31 mmol/L    Anion Gap 9 9 - 17 mmol/L    Alkaline Phosphatase 43 35 - 104 U/L    ALT 9 5 - 33 U/L    AST 15 <32 U/L    Total Bilirubin 0.15 (L) 0.3 - 1.2 mg/dL    Total Protein 6.6 6.4 - 8.3 g/dL    Albumin 3.0 (L) 3.5 - 5.2 g/dL    Albumin/Globulin Ratio 0.8 (L) 1.0 - 2.5    GFR Non- 45 (L) >60 mL/min    GFR  54 (L) >60 mL/min    GFR Comment          GFR Staging NOT REPORTED    Protein, urine, random   Result Value Ref Range    Total Protein, Urine 238 mg/dL   Creatinine, Random Urine   Result Value Ref Range    Creatinine, Ur 33.0 28.0 - 217.0 mg/dL   BASIC METABOLIC PANEL   Result Value Ref Range    Glucose 118 (H) 70 - 99 mg/dL    BUN 25 (H) 8 - 23 mg/dL    CREATININE 1.33 (H) 0.50 - 0.90 mg/dL    Bun/Cre Ratio NOT REPORTED 9 - 20    Calcium 8.7 8.6 - 10.4 mg/dL    Sodium 139 135 - 144 mmol/L    Potassium 3.6 (L) 3.7 - 5.3 mmol/L    Chloride 103 98 - 107 mmol/L    CO2 25 20 - 31 mmol/L    Anion Gap 11 9 - 17 mmol/L    GFR Non-African American 39 (L) >60 mL/min    GFR  47 (L) >60 mL/min    GFR Comment          GFR Staging NOT REPORTED    Immunofixation urine random profile   Result Value Ref Range    Urine IFX Specimen . URINE     Volume NOT REPORTED mL    Urine Total Protein 180 mg/dL    Urine IFX Interp       MONOCLONAL IMMUNOGLOBULIN IS PRESENT AND IDENTIFIED AS   BASIC METABOLIC PANEL   Result Value Ref Range    Glucose 98 70 - 99 mg/dL    BUN 24 (H) 8 - 23 mg/dL    CREATININE 1.27 (H) 0.50 - 0.90 mg/dL    Bun/Cre Ratio NOT REPORTED 9 - 20    Calcium 8.5 (L) 8.6 - 10.4 mg/dL    Sodium 139 135 - 144 mmol/L    Potassium 3.5 (L) 3.7 - 5.3 mmol/L    Chloride 105 98 - 107 mmol/L    CO2 21 20 - 31 mmol/L    Anion Gap 13 9 - 17 mmol/L    GFR Non-African American 41 (L) >60 mL/min    GFR African American 50 (L) >60 mL/min    GFR Comment          GFR Staging NOT REPORTED    BASIC METABOLIC PANEL   Result Value Ref Range    Glucose 93 70 - 99 mg/dL    BUN 21 8 - 23 mg/dL    CREATININE 1.28 (H) 0.50 - 0.90 mg/dL    Bun/Cre Ratio NOT REPORTED 9 - 20    Calcium 9.0 8.6 - 10.4 mg/dL    Sodium 139 135 - 144 mmol/L    Potassium 3.8 3.7 - 5.3 mmol/L    Chloride 101 98 - 107 mmol/L    CO2 26 20 - 31 mmol/L    Anion Gap 12 9 - 17 mmol/L    GFR Non-African American 41 (L) >60 mL/min    GFR  49 (L) >60 mL/min    GFR Comment          GFR Staging NOT REPORTED    CBC   Result Value Ref Range    WBC 8.3 3.5 - 11.3 k/uL    RBC 3.07 (L) 3.95 - 5.11 m/uL Hemoglobin 8.7 (L) 11.9 - 15.1 g/dL    Hematocrit 28.3 (L) 36.3 - 47.1 %    MCV 92.2 82.6 - 102.9 fL    MCH 28.3 25.2 - 33.5 pg    MCHC 30.7 28.4 - 34.8 g/dL    RDW 13.5 11.8 - 14.4 %    Platelets 544 252 - 054 k/uL    MPV 10.2 8.1 - 13.5 fL    NRBC Automated 0.0 0.0 per 100 WBC   POC Glucose Fingerstick   Result Value Ref Range    POC Glucose 82 65 - 105 mg/dL   POC Glucose Fingerstick   Result Value Ref Range    POC Glucose 96 65 - 105 mg/dL   TYPE AND SCREEN   Result Value Ref Range    Expiration Date 05/31/2021,2359     Arm Band Number [id number]     ABO/Rh A POSITIVE     Antibody Screen NEGATIVE    BLOOD BANK SPECIMEN   Result Value Ref Range    Blood Bank Specimen NOT REPORTED          IMAGING DATA:    US RENAL COMPLETE    Result Date: 5/27/2021  EXAMINATION: RETROPERITONEAL ULTRASOUND OF THE KIDNEYS AND URINARY BLADDER 5/27/2021 COMPARISON: None HISTORY: ORDERING SYSTEM PROVIDED HISTORY: ARF TECHNOLOGIST PROVIDED HISTORY: US RETROPERITONEAL COMPLETE ARF FINDINGS: Kidneys: The right kidney measures 10.0 x 4.0 x 3.5 cm and the left kidney measures 10.5 x 3.3 x 5.2 cm. Cortical thickness within normal limits bilaterally. There is diffuse increased renal parenchymal echogenicity bilaterally with increased prominence of the relative hypoechoic renal pyramids. A tiny 7 x 5 x 6 mm right lower pole renal cyst is noted. No other focal renal lesion. No hydronephrosis in either kidney. No sonographic evidence for renal calculi. Bladder: Unremarkable appearance of the bladder. No dilated distal ureters. The patient did not have the urge to void. Bilateral ureteral jets were identified. Diffuse increased renal parenchymal echogenicity bilaterally consistent with medical renal disease. No hydronephrosis in either kidney.          IMPRESSION:   Primary Problem  Compression fracture of lumbar vertebra 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 Pathological fracture of L2 with cord compression. Differentials could be benign fracture, primary bone tumor or metastatic cancer with unknown primary at this point. Will need more work-up. 2. IgA lambda multiple myeloma. 3. AMER[place]  4. 6 mm retropulsion of spinal canal with moderate canal narrowing    RECOMMENDATIONS:    1. No changes clinically. Neurosurgery planning for surgical resection of spine lesion on 6/3/2021  2. Further treatment for myeloma will be after the surgical resection and recovery. 3. Patient will benefit from radiation therapy and systemic chemotherapy. Management plan explained to the patient and family  4. We will do a bone marrow test down the line as outpatient, avoid now due to back complication    Diana Salazar MD  PGY-3 Internal Medicine Resident  [address] [place] [address]  6/1/2021 10:20 AM                    Attending Physician Statement   I have discussed the care of Donta Miranda, including pertinent history and exam findings with the resident. I have reviewed the key elements of all parts of the encounter with the resident. I have seen and examined the patient with the resident. I agree with the assessment and plan and status of the problem list as documented.                                6 [facility]                             This note is

## 2021-06-01 NOTE — PROGRESS NOTES
Categories: Underweight (BMI less than 22) age over 72       Nutrition Diagnosis:   · Severe malnutrition related to pain (limited ADLs 2/2 back pain, decrease appetite) as evidenced by weight loss greater than or equal to 10% in 6 months, BMI, severe muscle loss, severe loss of subcutaneous fat, lab values    · Inadequate oral intake related to  (poor appetite, variable PO intakes) as evidenced by intake 0-25%, intake 26-50%, intake 51-75%      Nutrition Interventions:   Food and/or Nutrient Delivery:  Continue Current Diet, Modify Oral Nutrition Supplement  Nutrition Education/Counseling:  No recommendation at this time   Coordination of Nutrition Care:  Continue to monitor while inpatient    Goals:  Meet >50% of estimated nutrient needs       Nutrition Monitoring and Evaluation:   Behavioral-Environmental Outcomes:  None Identified   Food/Nutrient Intake Outcomes:  Food and Nutrient Intake, Supplement Intake  Physical Signs/Symptoms Outcomes:  Biochemical Data, Nutrition Focused Physical Findings, Skin, Weight, GI Status, Nausea or Vomiting     Discharge Planning:     Too soon to determine     Electronically signed by Arleth Riley MS, RD, LD on 6/1/21 at 3:26 PM EDT    Contact: [address] [facility] Joaquin Acosta

## 2021-06-01 NOTE — PROGRESS NOTES
Neurosurgery MARIBELL/Resident    Daily Progress Note   CC:No chief complaint on file. 6/1/2021  10:20 AM    Chart reviewed. No acute events overnight. No new complaints. Doing well this morning, reports still having numbness to top aspect of bilateral thighs. Tolerating diet well and having BM. Urinating without difficulties    Vitals:    05/31/21 1218 05/31/21 2000 06/01/21 0538 06/01/21 0753   BP: (!) 161/99 (!) 157/88  (!) 150/76   Pulse: 94 102  94   Resp: 18   16   Temp: 98 °F (36.7 °C) 99 °F (37.2 °C)  97.4 °F (36.3 °C)   TempSrc: Oral Oral  Oral   SpO2: 97% 95%  91%   Weight:   92 lb 9.5 oz (42 kg)    Height:           PE:   AOx3   PERRL  Motor   L deltoid 5/5; R deltoid 5/5  L biceps 5/5; R biceps 5/5  L triceps 5/5; R triceps 5/5      L iliopsoas 5/5 , R iliopsoas 5/5  L quadriceps 5/5; R quadriceps 5/5  L Dorsiflexion 5/5; R dorsiflexion 5/5  L Plantarflexion 5/5; R plantarflexion 5/5  L EHL 5/5; R EHL 5/5    Sensation: numbness top aspect of bilateral thighs         Lab Results   Component Value Date    WBC 8.3 06/01/2021    HGB 8.7 (L) 06/01/2021    HCT 28.3 (L) 06/01/2021     06/01/2021    ALT 9 05/29/2021    AST 15 05/29/2021     06/01/2021    K 3.8 06/01/2021     06/01/2021    CREATININE 1.28 (H) 06/01/2021    BUN 21 06/01/2021    CO2 26 06/01/2021    TSH 7.01 (H) 05/27/2021    INR 1.2 05/27/2021         A/P  77 y.o. female who presents with L2 pathologic fracture with cord compression     TLSO brace while out of bed, ok to place on while sitting at side of bed   Planning for surgery Thursday for posterior L2 corpectomy, L1-L3 posterior fusion   On heparin at this time for DVT prophylaxis    Please contact neurosurgery with any changes in patients neurologic status.        Gianfranco Campos, CNP  6/1/21  10:20 AM

## 2021-06-01 NOTE — PROGRESS NOTES
Neurosurgery perfect served re pt c/o increased numbness and weakness to BLE. Resident seen pt at bedside.

## 2021-06-02 LAB
ANION GAP SERPL CALCULATED.3IONS-SCNC: 8 MMOL/L (ref 9–17)
BUN BLDV-MCNC: 28 MG/DL (ref 8–23)
BUN/CREAT BLD: ABNORMAL (ref 9–20)
CALCIUM SERPL-MCNC: 8.9 MG/DL (ref 8.6–10.4)
CHLORIDE BLD-SCNC: 101 MMOL/L (ref 98–107)
CO2: 27 MMOL/L (ref 20–31)
CREAT SERPL-MCNC: 1.46 MG/DL (ref 0.5–0.9)
EKG ATRIAL RATE: 103 BPM
EKG P AXIS: 51 DEGREES
EKG P-R INTERVAL: 144 MS
EKG Q-T INTERVAL: 326 MS
EKG QRS DURATION: 76 MS
EKG QTC CALCULATION (BAZETT): 427 MS
EKG R AXIS: -21 DEGREES
EKG T AXIS: 11 DEGREES
EKG VENTRICULAR RATE: 103 BPM
GFR AFRICAN AMERICAN: 42 ML/MIN
GFR NON-AFRICAN AMERICAN: 35 ML/MIN
GFR SERPL CREATININE-BSD FRML MDRD: ABNORMAL ML/MIN/{1.73_M2}
GFR SERPL CREATININE-BSD FRML MDRD: ABNORMAL ML/MIN/{1.73_M2}
GLUCOSE BLD-MCNC: 86 MG/DL (ref 70–99)
LV EF: 63 %
LVEF MODALITY: NORMAL
POTASSIUM SERPL-SCNC: 4.1 MMOL/L (ref 3.7–5.3)
SODIUM BLD-SCNC: 136 MMOL/L (ref 135–144)

## 2021-06-02 PROCEDURE — 2580000003 HC RX 258: Performed by: INTERNAL MEDICINE

## 2021-06-02 PROCEDURE — 51798 US URINE CAPACITY MEASURE: CPT

## 2021-06-02 PROCEDURE — 6370000000 HC RX 637 (ALT 250 FOR IP): Performed by: NURSE PRACTITIONER

## 2021-06-02 PROCEDURE — 99232 SBSQ HOSP IP/OBS MODERATE 35: CPT | Performed by: INTERNAL MEDICINE

## 2021-06-02 PROCEDURE — 6370000000 HC RX 637 (ALT 250 FOR IP): Performed by: INTERNAL MEDICINE

## 2021-06-02 PROCEDURE — APPSS30 APP SPLIT SHARED TIME 16-30 MINUTES: Performed by: PHYSICIAN ASSISTANT

## 2021-06-02 PROCEDURE — 80048 BASIC METABOLIC PNL TOTAL CA: CPT

## 2021-06-02 PROCEDURE — 1200000000 HC SEMI PRIVATE

## 2021-06-02 PROCEDURE — 93306 TTE W/DOPPLER COMPLETE: CPT

## 2021-06-02 PROCEDURE — 6360000002 HC RX W HCPCS: Performed by: INTERNAL MEDICINE

## 2021-06-02 PROCEDURE — 93010 ELECTROCARDIOGRAM REPORT: CPT | Performed by: INTERNAL MEDICINE

## 2021-06-02 PROCEDURE — 36415 COLL VENOUS BLD VENIPUNCTURE: CPT

## 2021-06-02 RX ORDER — SODIUM CHLORIDE 9 MG/ML
INJECTION, SOLUTION INTRAVENOUS CONTINUOUS
Status: DISCONTINUED | OUTPATIENT
Start: 2021-06-02 | End: 2021-06-09 | Stop reason: ALTCHOICE

## 2021-06-02 RX ADMIN — HEPARIN SODIUM 5000 UNITS: 5000 INJECTION INTRAVENOUS; SUBCUTANEOUS at 08:41

## 2021-06-02 RX ADMIN — DOCUSATE SODIUM 200 MG: 100 CAPSULE ORAL at 08:40

## 2021-06-02 RX ADMIN — ATORVASTATIN CALCIUM 20 MG: 20 TABLET, FILM COATED ORAL at 18:35

## 2021-06-02 RX ADMIN — OXYCODONE HYDROCHLORIDE AND ACETAMINOPHEN 2 TABLET: 5; 325 TABLET ORAL at 22:57

## 2021-06-02 RX ADMIN — HEPARIN SODIUM 5000 UNITS: 5000 INJECTION INTRAVENOUS; SUBCUTANEOUS at 14:21

## 2021-06-02 RX ADMIN — POLYETHYLENE GLYCOL 3350 17 G: 17 POWDER, FOR SOLUTION ORAL at 08:40

## 2021-06-02 RX ADMIN — LEVOTHYROXINE SODIUM 50 MCG: 50 TABLET ORAL at 08:40

## 2021-06-02 RX ADMIN — OXYCODONE HYDROCHLORIDE AND ACETAMINOPHEN 2 TABLET: 5; 325 TABLET ORAL at 00:46

## 2021-06-02 RX ADMIN — DOCUSATE SODIUM 50MG AND SENNOSIDES 8.6MG 2 TABLET: 8.6; 5 TABLET, FILM COATED ORAL at 08:40

## 2021-06-02 RX ADMIN — AMLODIPINE BESYLATE 10 MG: 10 TABLET ORAL at 08:40

## 2021-06-02 RX ADMIN — HEPARIN SODIUM 5000 UNITS: 5000 INJECTION INTRAVENOUS; SUBCUTANEOUS at 20:58

## 2021-06-02 RX ADMIN — SODIUM CHLORIDE, PRESERVATIVE FREE 10 ML: 5 INJECTION INTRAVENOUS at 20:57

## 2021-06-02 RX ADMIN — LATANOPROST 1 DROP: 50 SOLUTION OPHTHALMIC at 20:57

## 2021-06-02 RX ADMIN — SODIUM CHLORIDE, PRESERVATIVE FREE 10 ML: 5 INJECTION INTRAVENOUS at 08:40

## 2021-06-02 RX ADMIN — DOCUSATE SODIUM 200 MG: 100 CAPSULE ORAL at 20:57

## 2021-06-02 RX ADMIN — OXYCODONE HYDROCHLORIDE AND ACETAMINOPHEN 2 TABLET: 5; 325 TABLET ORAL at 11:50

## 2021-06-02 RX ADMIN — SODIUM CHLORIDE: 9 INJECTION, SOLUTION INTRAVENOUS at 23:00

## 2021-06-02 ASSESSMENT — PAIN SCALES - GENERAL
PAINLEVEL_OUTOF10: 8
PAINLEVEL_OUTOF10: 8
PAINLEVEL_OUTOF10: 9

## 2021-06-02 ASSESSMENT — ENCOUNTER SYMPTOMS
CONSTIPATION: 1
BACK PAIN: 1
COUGH: 0
VOMITING: 0
NAUSEA: 0
ABDOMINAL PAIN: 0
SHORTNESS OF BREATH: 0

## 2021-06-02 NOTE — PROGRESS NOTES
Today's Date: 6/2/2021  Patient Name: Delicia Payan  Date of admission: 5/27/2021  2:18 AM  Patient's age: 77 y. o., 1945  Admission Dx: Acute renal failure (ARF) ([facility] [address] [N17.9]    Reason for Consult: management recommendations  Requesting Physician: No admitting provider for patient encounter. CHIEF COMPLAINT: Compression fracture of lumbar vertebra    Subjective:  Patient seen today. No new complaints or acute events overnight  Vitals stable  Patient and family agreed for lumbar corpectomy and posterior fusion surgery, plan for 6/3/2021  Has persistent back pain and better with pain meds      Serum IFX Interp 05/27/2021  2:12  [address]   A ZONE OF RESTRICTION IS PRESENT IN THE BETA GLOBULIN REGION.  CONFIRMED BY IMMUNOFIXATION TO BE MONOCLONAL   Comment:   IgA LAMBDA (1.31 G/DL). FREE MONOCLONAL LIGHT CHAINS ARE PRESENT, IDENTIFIED AS      MRI thoracic spine:  Discrete enhancing lesions involving the spinous processes of the vertebral   bodies of T4, T6 and T7 and anterior aspect of vertebral body of T4   concerning for metastatic disease.       Diffuse T1 and T2 hypointensity of the marrow which may be effect of anemia   or chemotherapy.       Only partially included on the acquired images there is a diffuse   infiltrative mass lesion surrounding the anterior and lateral aspect of L1   vertebral body.  Clinical correlation and if appropriate contrast enhanced CT   of abdomen and pelvis is recommended. MRI cervical spine  Anterior aspect of C4 has enhancing lesion.  Finding may be related to   atypical hemangioma or marrow infiltration.       At C5-C6, moderate canal stenosis and severe bilateral neural foraminal   narrowing.  Left foraminal disc protrusion/osteophytic ridging impinges on   the exiting left C6 nerve root         HISTORY OF PRESENT ILLNESS:      The patient is a 77 y.o. female with no significant past medical history, she initially presented to the ER at outlying facility for worsening lower back pain, was found to have UTI with right ureteral stone and was treated with Bactrim for 7 days. Patient's back pain continued to worsen so follow-up MRI was done which showed burst L2 fracture with a 6 mm retropulsion of spinal canal with moderate canal narrowing. Patient was then transferred to [facility] for neurosurgery evaluation and admission. Currently, patient is awake alert sitting comfortably no acute distress. Vitals and labs reviewed patient admits to decreased appetite and weight loss since January. Neurosurgery has been consulted, will follow up on repeat CT chest abdomen and pelvis and MRI cervical and thoracic spine. Vitals stable  Labs reviewed. Creatinine 1.53, calcium 9.1, Hb 9.5    Past Medical History:   has a past medical history of Acute renal failure (ARF) (HCC), Compression fracture of lumbar vertebra ([address] Essential hypertension, Other hyperlipidemia, and Other specified hypothyroidism. Past Surgical History:   has a past surgical history that includes Finger trigger release (Left). Medications:    Prior to Admission medications    Medication Sig Start Date End Date Taking?  Authorizing Provider   naproxen (NAPROSYN) 500 MG tablet Take 500 mg by mouth 2 times daily (with meals)   Yes Historical Provider, MD   latanoprost (XALATAN) 0.005 % ophthalmic solution Place 1 drop into both eyes nightly   Yes Historical Provider, MD   levothyroxine (SYNTHROID) 50 MCG tablet Take 50 mcg by mouth Daily   Yes Historical Provider, MD   lisinopril (PRINIVIL;ZESTRIL) 20 MG tablet Take 20 mg by mouth daily   Yes Historical Provider, MD   simvastatin (ZOCOR) 20 MG tablet Take 20 mg by mouth nightly   Yes Historical Provider, MD   amLODIPine (NORVASC) 5 MG tablet Take 5 mg by mouth daily   Yes Historical Provider, MD   aspirin 81 MG chewable tablet Take 81 mg by mouth daily   Yes Historical Provider, MD     Current Facility-Administered Medications   Medication Dose Route Frequency Provider Last Rate Last Admin    magnesium hydroxide (MILK OF MAGNESIA) 400 MG/5ML suspension 30 mL  30 mL Oral Daily PRN Angelikaaaron DALTON Fisher, DO   30 mL at 06/01/21 0249    bisacodyl (DULCOLAX) suppository 10 mg  10 mg Rectal Daily PRN Piedad Poole MD   10 mg at 06/01/21 0417    metoprolol (LOPRESSOR) injection 5 mg  5 mg Intravenous Q6H PRN Tigist Lab, DO        polyethylene glycol (GLYCOLAX) packet 17 g  17 g Oral Daily Angelikad I Natalia, DO   17 g at 06/02/21 0840    amLODIPine (NORVASC) tablet 10 mg  10 mg Oral Daily Danyel Stewart MD   10 mg at 06/02/21 0840    docusate sodium (COLACE) capsule 200 mg  200 mg Oral BID Fredrick Díaz MD   200 mg at 06/02/21 0840    sennosides-docusate sodium (SENOKOT-S) 8.6-50 MG tablet 2 tablet  2 tablet Oral Daily Fredrick Díaz MD   2 tablet at 06/02/21 0840    oxyCODONE-acetaminophen (PERCOCET) 5-325 MG per tablet 1 tablet  1 tablet Oral Q4H PRN Fredrick Díaz MD        Or    oxyCODONE-acetaminophen (PERCOCET) 5-325 MG per tablet 2 tablet  2 tablet Oral Q4H PRN Fredrick Díaz MD   2 tablet at 06/02/21 0046    morphine (PF) injection 2 mg  2 mg Intravenous Q4H PRN Fredrick Díaz MD   2 mg at 05/31/21 2134    traMADol (ULTRAM) tablet 50 mg  50 mg Oral Q6H PRN Fredrick Díaz MD   50 mg at 06/01/21 2212    sodium chloride flush 0.9 % injection 5-40 mL  5-40 mL Intravenous 2 times per day Katerine Menon MD   10 mL at 06/02/21 0840    sodium chloride flush 0.9 % injection 5-40 mL  5-40 mL Intravenous PRN Katerine Menon MD        0.9 % sodium chloride infusion  25 mL Intravenous PRN Katerine Menon MD        nicotine (NICODERM CQ) 21 MG/24HR 1 patch  1 patch Transdermal Daily PRN Katerine Menon MD        acetaminophen (TYLENOL) tablet 650 mg  650 mg Oral Q6H PRN Katerine Menon MD   650 mg at 05/27/21 2147    Or    acetaminophen (TYLENOL) suppository 650 mg  650 mg Rectal Q6H PRN Katerine Menon MD       [employer] heparin (porcine) injection 5,000 Units  5,000 Units Subcutaneous 3 times per day Susannah Samayoa MD   5,000 Units at 06/02/21 0841    latanoprost (XALATAN) 0.005 % ophthalmic solution 1 drop  1 drop Both Eyes Nightly Inessa Corral APRN - NP   1 drop at 05/31/21 2134    levothyroxine (SYNTHROID) tablet 50 mcg  50 mcg Oral Daily Inessa Corral APRN - NP   50 mcg at 06/02/21 0840    atorvastatin (LIPITOR) tablet 20 mg  20 mg Oral Daily Inessa Corral, APRN - NP   20 mg at 06/01/21 1849    sodium chloride flush 0.9 % injection 10 mL  10 mL Intravenous PRN Gilberto Driver DO           Allergies:  Patient has no known allergies. Social History:   reports that she has never smoked. She has never used smokeless tobacco. She reports previous alcohol use. She reports that she does not use drugs. Family History: family history includes No Known Problems in her father and mother. REVIEW OF SYSTEMS:      Constitutional: No fever or chills. No night sweats, no weight loss   Eyes: No eye discharge, double vision, or eye pain   HEENT: negative for sore mouth, sore throat, hoarseness and voice change   Respiratory: negative for cough , sputum, dyspnea, wheezing, hemoptysis, chest pain   Cardiovascular: negative for chest pain, dyspnea, palpitations, orthopnea, PND   Gastrointestinal: negative for nausea, vomiting, diarrhea, constipation, abdominal pain, Dysphagia, hematemesis and hematochezia   Genitourinary: negative for frequency, dysuria, nocturia, urinary incontinence, and hematuria   Integument: negative for rash, skin lesions, bruises.    Hematologic/Lymphatic: negative for easy bruising, bleeding, lymphadenopathy, or petechiae   Endocrine: negative for heat or cold intolerance,weight changes, change in bowel habits and hair loss   Musculoskeletal: negative for myalgias, arthralgias, pain, joint swelling,and bone pain   Neurological: negative for headaches, dizziness, seizures, weakness, numbness    PHYSICAL EXAM: /74   Pulse 81   Temp 97.4 °F (36.3 °C) (Oral)   Resp 18   Ht 5' (1.524 m)   Wt 92 lb 9.5 oz (42 kg)   SpO2 92%   BMI 18.08 kg/m²    Temp (24hrs), Av.1 °F (36.7 °C), Min:97.4 °F (36.3 °C), Max:98.8 °F (37.1 °C)      General appearance - well appearing, no in pain or distress   Mental status - alert and cooperative   Eyes - pupils equal and reactive, extraocular eye movements intact   Ears - bilateral TM's and external ear canals normal   Mouth - mucous membranes moist, pharynx normal without lesions   Neck - supple, no significant adenopathy   Lymphatics - no palpable lymphadenopathy, no hepatosplenomegaly   Chest - clear to auscultation, no wheezes, rales or rhonchi, symmetric air entry   Heart - normal rate, regular rhythm, normal S1, S2, no murmurs  Abdomen - soft, nontender, nondistended, no masses or organomegaly   Neurological -alert and oriented. No gross motor or sensory deficit  Musculoskeletal -lumbar spinal tenderness present  Extremities - peripheral pulses normal, no pedal edema, no clubbing or cyanosis   Skin - normal coloration and turgor, no rashes, no suspicious skin lesions noted ,      DATA:      Labs:     Results for orders placed or performed during the hospital encounter of 21   Urinalysis with microscopic   Result Value Ref Range    Color, UA ORANGE (A) YELLOW    Turbidity UA CLEAR CLEAR    Glucose, Ur NEGATIVE NEGATIVE    Bilirubin Urine NEGATIVE NEGATIVE    Ketones, Urine NEGATIVE NEGATIVE    Specific Gravity, UA 1.014 1.005 - 1.030    Urine Hgb LARGE (A) NEGATIVE    pH, UA 7.0 5.0 - 8.0    Protein, UA 2+ (A) NEGATIVE    Urobilinogen, Urine Normal Normal    Nitrite, Urine NEGATIVE NEGATIVE    Leukocyte Esterase, Urine TRACE (A) NEGATIVE    -          WBC, UA 10 TO 20 0 - 5 /HPF    RBC, UA TOO NUMEROUS TO COUNT 0 - 4 /HPF    Casts UA  0 - 8 /LPF     2 TO 5 HYALINE Reference range defined for non-centrifuged specimen.     Crystals, UA NOT REPORTED None /HPF Epithelial Cells UA 0 TO 2 0 - 5 /HPF    Renal Epithelial, UA NOT REPORTED 0 /HPF    Bacteria, UA NOT REPORTED None    Mucus, UA NOT REPORTED None    Trichomonas, UA NOT REPORTED None    Amorphous, UA NOT REPORTED None    Other Observations UA NOT REPORTED NOT REQ.     Yeast, UA NOT REPORTED None   Sodium, urine, random   Result Value Ref Range    Sodium,Ur 110 mmol/L   Protein, urine, random   Result Value Ref Range    Total Protein, Urine 354 mg/dL   Osmolality, urine   Result Value Ref Range    Osmolality, Ur 421 80 - 1300 mOsm/kg   Creatinine, urine, random   Result Value Ref Range    Creatinine, Ur 54.7 28.0 - 217.0 mg/dL   CBC   Result Value Ref Range    WBC 7.5 3.5 - 11.3 k/uL    RBC 3.29 (L) 3.95 - 5.11 m/uL    Hemoglobin 9.5 (L) 11.9 - 15.1 g/dL    Hematocrit 30.1 (L) 36.3 - 47.1 %    MCV 91.5 82.6 - 102.9 fL    MCH 28.9 25.2 - 33.5 pg    MCHC 31.6 28.4 - 34.8 g/dL    RDW 13.6 11.8 - 14.4 %    Platelets 179 056 - 967 k/uL    MPV 9.8 8.1 - 13.5 fL    NRBC Automated 0.0 0.0 per 100 WBC   Comprehensive Metabolic Panel w/ Reflex to MG   Result Value Ref Range    Glucose 154 (H) 70 - 99 mg/dL    BUN 27 (H) 8 - 23 mg/dL    CREATININE 1.53 (H) 0.50 - 0.90 mg/dL    Bun/Cre Ratio NOT REPORTED 9 - 20    Calcium 9.1 8.6 - 10.4 mg/dL    Sodium 142 135 - 144 mmol/L    Potassium 4.1 3.7 - 5.3 mmol/L    Chloride 105 98 - 107 mmol/L    CO2 23 20 - 31 mmol/L    Anion Gap 14 9 - 17 mmol/L    Alkaline Phosphatase 52 35 - 104 U/L    ALT 11 5 - 33 U/L    AST 16 <32 U/L    Total Bilirubin 0.24 (L) 0.3 - 1.2 mg/dL    Total Protein 7.2 6.4 - 8.3 g/dL    Albumin 3.2 (L) 3.5 - 5.2 g/dL    Albumin/Globulin Ratio 0.8 (L) 1.0 - 2.5    GFR Non- 33 (L) >60 mL/min    GFR African American 40 (L) >60 mL/min    GFR Comment          GFR Staging NOT REPORTED    Magnesium   Result Value Ref Range    Magnesium 1.8 1.6 - 2.6 mg/dL   Protime-INR   Result Value Ref Range    Protime 12.4 (H) 9.1 - 12.3 sec    INR 1.2    Vitamin B12 & Folate   Result Value Ref Range    Vitamin B-12 857 232 - 1245 pg/mL    Folate >20.0 >4.8 ng/mL   Iron and TIBC   Result Value Ref Range    Iron 75 37 - 145 ug/dL    TIBC 215 (L) 250 - 450 ug/dL    Iron Saturation 35 20 - 55 %    UIBC 140 112 - 347 ug/dL   Ferritin   Result Value Ref Range    Ferritin 292 (H) 13 - 150 ug/L   Reticulocytes   Result Value Ref Range    Retic % 1.6 0.5 - 1.9 %    Absolute Retic # 0.050 0.030 - 0.080 M/uL    Immature Retic Fract 12.400 2.7 - 18.3 %    Retic Hemoglobin 30.8 28.2 - 35.7 pg   TSH with Reflex   Result Value Ref Range    TSH 7.01 (H) 0.30 - 5.00 mIU/L   Electrophoresis Protein, Serum without Reflex to Immunofixation   Result Value Ref Range    Total Protein 6.8 6.4 - 8.3 g/dL    Albumin (calculated) 3.3 3.2 - 5.2 g/dL    Albumin % 49 45 - 65 %    Alpha-1-Globulin 0.2 0.1 - 0.4 g/dL    Alpha 1 % 3 3 - 6 %    Alpha-2-Globulin 0.9 0.5 - 0.9 g/dL    Alpha 2 % 13 6 - 13 %    Beta Globulin 2.1 (H) 0.5 - 1.1 g/dL    Beta Percent 31 (H) 11 - 19 %    Gamma Globulin 0.3 (L) 0.5 - 1.5 g/dL    Gamma Globulin % 4 (L) 9 - 20 %    Total Prot. Sum 6.8 6.3 - 8.2 g/dL    Total Prot. Sum,% 100 98 - 102 %    Protein Electrophoresis, Serum       A ZONE OF RESTRICTION IS PRESENT IN THE BETA GLOBULIN REGION. CONFIRMED BY IMMUNOFIXATION TO BE MONOCLONAL    Pathologist ELECTRONICALLY SIGNED. Jaylen Scott M.D. IMMUNOFIXATION SERUM PROFILE   Result Value Ref Range    Serum IFX Interp       A ZONE OF RESTRICTION IS PRESENT IN THE BETA GLOBULIN REGION. CONFIRMED BY IMMUNOFIXATION TO BE MONOCLONAL    Pathologist ELECTRONICALLY SIGNED.  Jaylen Scott M.D.    KAPPA/LAMBDA QUANT FREE LIGHT CHAINS SERUM   Result Value Ref Range    Kappa Free Light Chains QNT 0.97 0.37 - 1.94 mg/dL    Lambda Free Light Chains .34 (H) 0.57 - 2.63 mg/dL    Free Kappa/Lambda Ratio 0.01 (L) 0.26 - 1.65   IMMUNOGLOBULINS PANEL   Result Value Ref Range    IgG <300 (L) 700 - 1600 mg/dL    IgA 2250 (H) 70 - 400 NEGATIVE NEGATIVE    Bilirubin Urine NEGATIVE NEGATIVE    Ketones, Urine NEGATIVE NEGATIVE    Specific Gravity, UA 1.010 1.005 - 1.030    Urine Hgb LARGE (A) NEGATIVE    pH, UA 6.0 5.0 - 8.0    Protein, UA 2+ (A) NEGATIVE    Urobilinogen, Urine Normal Normal    Nitrite, Urine NEGATIVE NEGATIVE    Leukocyte Esterase, Urine NEGATIVE NEGATIVE    -          WBC, UA 5 TO 10 0 - 5 /HPF    RBC, UA TOO NUMEROUS TO COUNT 0 - 4 /HPF    Casts UA  0 - 8 /LPF     2 TO 5 HYALINE Reference range defined for non-centrifuged specimen. Crystals, UA NOT REPORTED None /HPF    Epithelial Cells UA 0 TO 2 0 - 5 /HPF    Renal Epithelial, UA NOT REPORTED 0 /HPF    Bacteria, UA NOT REPORTED None    Mucus, UA NOT REPORTED None    Trichomonas, UA NOT REPORTED None    Amorphous, UA NOT REPORTED None    Other Observations UA NOT REPORTED NOT REQ.     Yeast, UA NOT REPORTED None   CBC   Result Value Ref Range    WBC 8.0 3.5 - 11.3 k/uL    RBC 2.86 (L) 3.95 - 5.11 m/uL    Hemoglobin 8.1 (L) 11.9 - 15.1 g/dL    Hematocrit 27.5 (L) 36.3 - 47.1 %    MCV 96.2 82.6 - 102.9 fL    MCH 28.3 25.2 - 33.5 pg    MCHC 29.5 28.4 - 34.8 g/dL    RDW 13.4 11.8 - 14.4 %    Platelets 405 412 - 622 k/uL    MPV 9.7 8.1 - 13.5 fL    NRBC Automated 0.0 0.0 per 100 WBC   Comprehensive Metabolic Panel w/ Reflex to MG   Result Value Ref Range    Glucose 82 70 - 99 mg/dL    BUN 25 (H) 8 - 23 mg/dL    CREATININE 1.18 (H) 0.50 - 0.90 mg/dL    Bun/Cre Ratio NOT REPORTED 9 - 20    Calcium 8.6 8.6 - 10.4 mg/dL    Sodium 136 135 - 144 mmol/L    Potassium 3.7 3.7 - 5.3 mmol/L    Chloride 105 98 - 107 mmol/L    CO2 22 20 - 31 mmol/L    Anion Gap 9 9 - 17 mmol/L    Alkaline Phosphatase 43 35 - 104 U/L    ALT 9 5 - 33 U/L    AST 15 <32 U/L    Total Bilirubin 0.15 (L) 0.3 - 1.2 mg/dL    Total Protein 6.6 6.4 - 8.3 g/dL    Albumin 3.0 (L) 3.5 - 5.2 g/dL    Albumin/Globulin Ratio 0.8 (L) 1.0 - 2.5    GFR Non- 45 (L) >60 mL/min    GFR  54 (L) >60 mL/min GFR Comment          GFR Staging NOT REPORTED    Protein, urine, random   Result Value Ref Range    Total Protein, Urine 238 mg/dL   Creatinine, Random Urine   Result Value Ref Range    Creatinine, Ur 33.0 28.0 - 217.0 mg/dL   BASIC METABOLIC PANEL   Result Value Ref Range    Glucose 118 (H) 70 - 99 mg/dL    BUN 25 (H) 8 - 23 mg/dL    CREATININE 1.33 (H) 0.50 - 0.90 mg/dL    Bun/Cre Ratio NOT REPORTED 9 - 20    Calcium 8.7 8.6 - 10.4 mg/dL    Sodium 139 135 - 144 mmol/L    Potassium 3.6 (L) 3.7 - 5.3 mmol/L    Chloride 103 98 - 107 mmol/L    CO2 25 20 - 31 mmol/L    Anion Gap 11 9 - 17 mmol/L    GFR Non-African American 39 (L) >60 mL/min    GFR  47 (L) >60 mL/min    GFR Comment          GFR Staging NOT REPORTED    Immunofixation urine random profile   Result Value Ref Range    Urine IFX Specimen . URINE     Volume NOT REPORTED mL    Urine Total Protein 180 mg/dL    Urine IFX Interp       MONOCLONAL IMMUNOGLOBULIN IS PRESENT AND IDENTIFIED AS   BASIC METABOLIC PANEL   Result Value Ref Range    Glucose 98 70 - 99 mg/dL    BUN 24 (H) 8 - 23 mg/dL    CREATININE 1.27 (H) 0.50 - 0.90 mg/dL    Bun/Cre Ratio NOT REPORTED 9 - 20    Calcium 8.5 (L) 8.6 - 10.4 mg/dL    Sodium 139 135 - 144 mmol/L    Potassium 3.5 (L) 3.7 - 5.3 mmol/L    Chloride 105 98 - 107 mmol/L    CO2 21 20 - 31 mmol/L    Anion Gap 13 9 - 17 mmol/L    GFR Non-African American 41 (L) >60 mL/min    GFR African American 50 (L) >60 mL/min    GFR Comment          GFR Staging NOT REPORTED    BASIC METABOLIC PANEL   Result Value Ref Range    Glucose 93 70 - 99 mg/dL    BUN 21 8 - 23 mg/dL    CREATININE 1.28 (H) 0.50 - 0.90 mg/dL    Bun/Cre Ratio NOT REPORTED 9 - 20    Calcium 9.0 8.6 - 10.4 mg/dL    Sodium 139 135 - 144 mmol/L    Potassium 3.8 3.7 - 5.3 mmol/L    Chloride 101 98 - 107 mmol/L    CO2 26 20 - 31 mmol/L    Anion Gap 12 9 - 17 mmol/L    GFR Non-African American 41 (L) >60 mL/min    GFR  49 (L) >60 mL/min    GFR Comment GFR Staging NOT REPORTED    CBC   Result Value Ref Range    WBC 8.3 3.5 - 11.3 k/uL    RBC 3.07 (L) 3.95 - 5.11 m/uL    Hemoglobin 8.7 (L) 11.9 - 15.1 g/dL    Hematocrit 28.3 (L) 36.3 - 47.1 %    MCV 92.2 82.6 - 102.9 fL    MCH 28.3 25.2 - 33.5 pg    MCHC 30.7 28.4 - 34.8 g/dL    RDW 13.5 11.8 - 14.4 %    Platelets 555 178 - 788 k/uL    MPV 10.2 8.1 - 13.5 fL    NRBC Automated 0.0 0.0 per 100 WBC   BASIC METABOLIC PANEL   Result Value Ref Range    Glucose 86 70 - 99 mg/dL    BUN 28 (H) 8 - 23 mg/dL    CREATININE 1.46 (H) 0.50 - 0.90 mg/dL    Bun/Cre Ratio NOT REPORTED 9 - 20    Calcium 8.9 8.6 - 10.4 mg/dL    Sodium 136 135 - 144 mmol/L    Potassium 4.1 3.7 - 5.3 mmol/L    Chloride 101 98 - 107 mmol/L    CO2 27 20 - 31 mmol/L    Anion Gap 8 (L) 9 - 17 mmol/L    GFR Non-African American 35 (L) >60 mL/min    GFR  42 (L) >60 mL/min    GFR Comment          GFR Staging NOT REPORTED    POC Glucose Fingerstick   Result Value Ref Range    POC Glucose 82 65 - 105 mg/dL   POC Glucose Fingerstick   Result Value Ref Range    POC Glucose 96 65 - 105 mg/dL   EKG 12 Lead   Result Value Ref Range    Ventricular Rate 103 BPM    Atrial Rate 103 BPM    P-R Interval 144 ms    QRS Duration 76 ms    Q-T Interval 326 ms    QTc Calculation (Bazett) 427 ms    P Axis 51 degrees    R Axis -21 degrees    T Axis 11 degrees   TYPE AND SCREEN   Result Value Ref Range    Expiration Date 05/31/2021,2359     Arm Band Number [id number]     ABO/Rh A POSITIVE     Antibody Screen NEGATIVE    BLOOD BANK SPECIMEN   Result Value Ref Range    Blood Bank Specimen NOT REPORTED    TYPE AND SCREEN   Result Value Ref Range    Expiration Date 06/04/2021,2359     Arm Band Number [id number]     ABO/Rh A POSITIVE     Antibody Screen NEGATIVE    BLOOD BANK SPECIMEN   Result Value Ref Range    Blood Bank Specimen NOT REPORTED          IMAGING DATA:    US RENAL COMPLETE    Result Date: 5/27/2021  EXAMINATION: RETROPERITONEAL ULTRASOUND OF THE multiple myeloma. 3. AMERICO  4. 6 mm retropulsion of spinal canal with moderate canal narrowing    RECOMMENDATIONS:    1. No changes clinically. Neurosurgery planning for surgical resection of spine lesion on 6/3/2021  2. Further treatment for myeloma will be after the surgical resection and recovery. 3. Patient will benefit from radiation therapy and systemic chemotherapy. Management plan explained to the patient and family  4. We will do a bone marrow test down the line as outpatient, avoid now due to back complication    Diana Salazar MD  PGY-3 Internal Medicine Resident  [address] [place] [address]  6/2/2021 9:18 AM                           Attending Physician Statement   I have discussed the care of Donta Miranda, including pertinent history and exam findings with the resident. I have reviewed the key elements of all parts of the encounter with the resident. I have seen and examined the patient with the resident. I agree with the assessment and plan and status of the problem list as documented. [address] [place] [address] [facility]                               This note is created with the assistance of a speech recognition program.  While intending to generate a document that actually reflects the content of the visit, the document can still have some errors including those of syntax and sound a like substitutions which may escape proof reading. It such instances, actual meaning can be extrapolated by contextual diversion.

## 2021-06-02 NOTE — PLAN OF CARE
Problem: Skin Integrity:  Goal: Will show no infection signs and symptoms  Description: Will show no infection signs and symptoms  6/2/2021 0203 by Shani Sutton RN  Outcome: Ongoing  6/1/2021 1516 by Jahaira Russell RN  Outcome: Ongoing  Goal: Absence of new skin breakdown  Description: Absence of new skin breakdown  6/2/2021 0203 by Shani Sutton RN  Outcome: Ongoing  6/1/2021 1516 by Jahaira Russell RN  Outcome: Ongoing     Problem: Skin Integrity:  Goal: Will show no infection signs and symptoms  Description: Will show no infection signs and symptoms  6/2/2021 0203 by Shani Sutton RN  Outcome: Ongoing  6/1/2021 1516 by Jahaira Russell RN  Outcome: Ongoing  Goal: Absence of new skin breakdown  Description: Absence of new skin breakdown  6/2/2021 0203 by Shani Sutton RN  Outcome: Ongoing  6/1/2021 1516 by Jahaira Russell RN  Outcome: Ongoing     Problem: Skin Integrity:  Goal: Will show no infection signs and symptoms  Description: Will show no infection signs and symptoms  6/2/2021 0203 by Shani Sutton RN  Outcome: Ongoing  6/1/2021 1516 by Jahaira Russell RN  Outcome: Ongoing  Goal: Absence of new skin breakdown  Description: Absence of new skin breakdown  6/2/2021 0203 by Shani Sutton RN  Outcome: Ongoing  6/1/2021 1516 by Jahaira Russell RN  Outcome: Ongoing     Problem: Skin Integrity:  Goal: Will show no infection signs and symptoms  Description: Will show no infection signs and symptoms  6/2/2021 0203 by Shani Sutton RN  Outcome: Ongoing  6/1/2021 1516 by Jahaira Russell RN  Outcome: Ongoing  Goal: Absence of new skin breakdown  Description: Absence of new skin breakdown  6/2/2021 0203 by Shani Sutton RN  Outcome: Ongoing  6/1/2021 1516 by Jahaira Russell RN  Outcome: Ongoing     Problem: Skin Integrity:  Goal: Will show no infection signs and symptoms  Description: Will show no infection signs and symptoms  6/2/2021 0203 by Shani Sutton RN  Outcome: Ongoing 6/1/2021 1516 by Liv Zavala RN  Outcome: Ongoing  Goal: Absence of new skin breakdown  Description: Absence of new skin breakdown  6/2/2021 0203 by Lobo Hazel RN  Outcome: Ongoing  6/1/2021 1516 by Liv Zavala RN  Outcome: Ongoing     Problem: Skin Integrity:  Goal: Will show no infection signs and symptoms  Description: Will show no infection signs and symptoms  6/2/2021 0203 by Lobo Hazel RN  Outcome: Ongoing  6/1/2021 1516 by Liv Zavala RN  Outcome: Ongoing  Goal: Absence of new skin breakdown  Description: Absence of new skin breakdown  6/2/2021 0203 by Lobo Hazel RN  Outcome: Ongoing  6/1/2021 1516 by Liv Zavala RN  Outcome: Ongoing

## 2021-06-02 NOTE — PROGRESS NOTES
[facility]    Office: [address], DO, Sami Molina, DO, Suzie Leach, DO, Senia Benson Blood, DO, Mendy Isabel MD, José Miguel Cespedes MD, Zach Fernández MD, Gina Sue MD, Ronel Day MD, Skylar Zacarias MD, Radha Ring MD, Keith Sherman MD, Román Cooney MD, Flor Mcqueen, DO, Josie Garcia MD, Jeanna Flores MD, Bhavana Oleary, DO, Renee Rodríguez MD,  Dorothy Carrasquillo DO, Walter Florian MD, Surendra Tabares MD, Lester Parikh, [facility], [facility], CNP, Alberto Hardy, CNP, Stephanie Butler, CNS, Lan Head, CNP, Vinicius Blanco, CNP, Kayla Irizarry, CNP, Sin Rodriguez, CNP, Eligio Bradshaw, CNP, Madeleine Martins PA-C, Ute Diaz, [facility], Marisa Bradshaw, CNP, Mesfin Grijalva, CNP, Flower Rodriguez, CNP, Alisson Lind, CNP, Michael Mcmahon, [address]    Progress Note    6/2/2021    2:03 PM    Name:   Ruth Nova  MRN:     [id number]     Acct:      [de-identified]   Room:   [address] [place] [address] Day:  6  Admit Date:  5/27/2021  2:18 AM    PCP:   No primary care provider on file. Code Status:  Full Code    Subjective:     C/C:   Back pain    Interval History Status:  Back Pain persists  Pain still rated 7/10  Not using brace  Reporting a small bowel movement  Intake fair  Has been drinking Ensure    Data Base Updates:  Yxfxwvd58mx/dL     WNJ56Yywe mg/dL   CREATININE1. 46High     Brief History:     As documented in the medical record: \"This is a 58-year-old female who initially presented to the hospital with complaints of increasing back pain. Initial MRI did show burst L2 fracture with 6 mm retropulsion of the spinal canal with moderate canal narrowing along with lucency in the right side. Patient was transferred for neurosurgery evaluation. MRI was concerning for pathologic fracture. IgA level 2250, kappa lambda ratio 0.01 and of work-up pending. Tentative plan for OR this wednesday per Neurosurgery.      The plan has included:  1. Pathologic fracture of lumbar Vertebra: Neurosurgery consulted, plan for OR early next week. Continue PT/OT. TLSO brace ordered. Hold ASA,  2. Workup for MM in progress. Kappa/Lambda 0.97/136. Immunoglobulin panel: IGA: 2250. Oncology consulted, need tissue diagnosis. 3. Protein calorie malnutrition: liberalize diet, High calorie supplement. 4. Nephrotic range proteinuria: increase norvasc 10 mg . Add ACE/ARB when AMERICO resolves  5. Monitor Hgb, transfuse prn for symptomatic anemia or Hgb <7  6. Continue Lipitor 20 mg daily. 7. Continue DVT ppx.  8. Continue Synthroid 50 mcg daily. 9. Risk stratification: patient is low/intermediate risk for surgery. \"     Immunofixation urine random profile [[id number]] Collected: 05/29/21 0520 Updated: 06/01/21 0955 Urine IFX Specimen. URINE VolumeNOT REPORTEDmL Urine Total Sqheofm362jy/dL Urine IFX InterpMONOCLONAL IMMUNOGLOBULIN IS PRESENT AND IDENTIFIED AS   Comment: IGA LAMBDA (1.4 MG/DL). FREE MONOCLONAL LIGHT CHAINS ARE PRESENT, IDENTIFIED AS   LAMBDA (142.2 MG/DL). Results for Willy Miller (MRN [id number]) as of 6/1/2021 17:01   Ref. Range 5/27/2021 09:44 5/27/2021 14:12 5/28/2021 05:41 5/28/2021 07:36 5/28/2021 21:12 5/29/2021 06:47 5/30/2021 11:24 5/31/2021 06:21 6/1/2021 06:14   Creatinine Latest Ref Range: 0.50 - 0.90 mg/dL 1.53 (H)  1.61 (H)   1.18 (H) 1.33 (H) 1.27 (H) 1.28 (H)       EKG:  Sinus tachycardia   Cannot rule out Anterior infarct , age undetermined   Abnormal ECG   No previous ECGs available         Medications:      Allergies:  No Known Allergies    Current Meds:   Scheduled Meds:    polyethylene glycol  17 g Oral Daily    amLODIPine  10 mg Oral Daily    docusate sodium  200 mg Oral BID    sennosides-docusate sodium  2 tablet Oral Daily    sodium chloride flush  5-40 mL Intravenous 2 times per day    heparin (porcine)  5,000 Units Subcutaneous 3 times per day    latanoprost  1 drop Both Eyes Nightly    levothyroxine Result Date: 5/28/2021  No definite focal lytic lesions identified on radiographic bone survey. Severe compression fracture at L2 is redemonstrated (previously seen on MRI dated 05/25/2021). Ribs appear mildly heterogeneous. CT LUMBAR SPINE WO CONTRAST    Result Date: 5/30/2021  Redemonstration of a pathologic compression fracture of L2 with extraosseous extension resulting in canal stenosis as well as bilateral L2-3 foraminal narrowing. This is better evaluated on the recent MRI examination. Multilevel degenerative change with degenerative bilateral foraminal narrowing at L5-S1. Small bibasilar effusions with adjacent infiltrates. MRI CERVICAL SPINE W WO CONTRAST    Result Date: 5/27/2021  No evidence of metastatic disease inside the spinal canal. C2 and C3 vertebral body demonstrate fat marrow signal and remainder of the cervical vertebral bodies have diffuse T1 and T2 hypointensity. C2 and C3 may have been exposed to prior radiation. The appearance of the remainder of vertebral bodies may be related to anemia or other diffuse marrow abnormalities. Anterior aspect of C4 has enhancing lesion. Finding may be related to atypical hemangioma or marrow infiltration. At C5-C6, moderate canal stenosis and severe bilateral neural foraminal narrowing. Left foraminal disc protrusion/osteophytic ridging impinges on the exiting left C6 nerve root. Other mild to moderate degenerative changes of cervical spine as described. MRI THORACIC SPINE W WO CONTRAST    Addendum Date: 5/27/2021    ADDENDUM: Critical results were called by Dr. Catherine Tomlinson MD to [place] on 5/27/2021 at 19:26. The reported infiltrative mass lesion within the upper lumbar spine may also be related to hematoma . Result Date: 5/27/2021  Discrete enhancing lesions involving the spinous processes of the vertebral bodies of T4, T6 and T7 and anterior aspect of vertebral body of T4 concerning for metastatic disease.  Diffuse T1 and T2 hypointensity of the marrow which may be effect of anemia or chemotherapy. Only partially included on the acquired images there is a diffuse infiltrative mass lesion surrounding the anterior and lateral aspect of L1 vertebral body. Clinical correlation and if appropriate contrast enhanced CT of abdomen and pelvis is recommended. The findings were sent to the Radiology Results [address] at 7:10 pm on 5/27/2021to be communicated to a licensed caregiver. US RENAL COMPLETE    Result Date: 5/27/2021  Diffuse increased renal parenchymal echogenicity bilaterally consistent with medical renal disease. No hydronephrosis in either kidney. 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  *      Plan:        Neurosurgical evaluation in progress  Surgery planned for Thursday  EKG reveals poor R wave progression  Will check echo  Pain management  Blood Pressure - Monitor and control   Lopressor as needed  Hematology evaluation and follow-up as scheduled  Nephrology evaluation follow-up as scheduled  Check bun and creatinine   Avoid nephrotoxins  Nutritional supplementation  Laxatives as needed    DVT prophylaxis    IP CONSULT TO NEUROSURGERY  IP CONSULT TO HEM/ONC  IP CONSULT TO DIETITIAN  INPATIENT CONSULT TO ORTHOTIST/PROSTHETIST  IP CONSULT TO NEPHROLOGY  IP CONSULT TO IV TEAM    Immanuel Mendoza DO  6/2/2021  2:03 PM

## 2021-06-02 NOTE — PROGRESS NOTES
Renal Progress Note    Patient :  Kirk Kaur; 77 y.o. MRN# [id number]  Location:  4942/5006-99  Attending:  Jakob Douglass DO  Admit Date:  5/27/2021   Hospital Day: 6      Subjective:     Patient seen and examined. Patient was sitting comfortably. Her pain was under good control. She denies any nausea or vomiting. Her appetite is poor but she is keeping up on her fluid intake. Patient seen and examined. Her blood pressure is under good control  She denies any nausea and vomiting  No chest pain PND orthopnea. Her creatinine is slightly elevated at 1.46 today.   Patient is for surgery tomorrow  Outpatient Medications:     Medications Prior to Admission: naproxen (NAPROSYN) 500 MG tablet, Take 500 mg by mouth 2 times daily (with meals)  latanoprost (XALATAN) 0.005 % ophthalmic solution, Place 1 drop into both eyes nightly  levothyroxine (SYNTHROID) 50 MCG tablet, Take 50 mcg by mouth Daily  lisinopril (PRINIVIL;ZESTRIL) 20 MG tablet, Take 20 mg by mouth daily  simvastatin (ZOCOR) 20 MG tablet, Take 20 mg by mouth nightly  amLODIPine (NORVASC) 5 MG tablet, Take 5 mg by mouth daily  aspirin 81 MG chewable tablet, Take 81 mg by mouth daily    Current Medications:     Scheduled Meds:    polyethylene glycol  17 g Oral Daily    amLODIPine  10 mg Oral Daily    docusate sodium  200 mg Oral BID    sennosides-docusate sodium  2 tablet Oral Daily    sodium chloride flush  5-40 mL Intravenous 2 times per day    heparin (porcine)  5,000 Units Subcutaneous 3 times per day    latanoprost  1 drop Both Eyes Nightly    levothyroxine  50 mcg Oral Daily    atorvastatin  20 mg Oral Daily     Continuous Infusions:    sodium chloride       PRN Meds:  magnesium hydroxide, bisacodyl, metoprolol, oxyCODONE-acetaminophen **OR** oxyCODONE-acetaminophen, morphine, traMADol, sodium chloride flush, sodium chloride, nicotine, acetaminophen **OR** acetaminophen, sodium chloride flush    Input/Output:       No intake/output data recorded. .      Patient Vitals for the past 96 hrs (Last 3 readings):   Weight   21 0538 92 lb 9.5 oz (42 kg)   21 0600 94 lb 9.2 oz (42.9 kg)       Vital Signs:   Temperature:  Temp: 97.4 °F (36.3 °C)  TMax:   Temp (24hrs), Av.1 °F (36.7 °C), Min:97.4 °F (36.3 °C), Max:98.8 °F (37.1 °C)    Respirations:  Resp: 18  Pulse:   Pulse: 81  BP:    BP: 135/74  BP Range: Systolic (90HLS), JESSE:131 , Min:135 , CLU:554       Diastolic (93HNA), SVS:88, Min:71, Max:78      Physical Examination:     General:  Sitting comfortably alert and oriented x3  HEENT: Atraumatic, neck was supple,  Eyes:   Pupils were reactive to light. Neck:   No JVD or lymphadenopathy  Chest:              Bilateral air entry and clear  cardiac:  S1 and S2 audible no S3   Abdomen: Soft and nontender bowel sounds are positive. :   No suprapubic or flank tenderness. Neuro:  AAO x 3, No FND. SKIN:  No rashes, good skin turgor. Extremities:  Trace edema    Labs:       Recent Labs     21  0614   WBC 8.3   RBC 3.07*   HGB 8.7*   HCT 28.3*   MCV 92.2   MCH 28.3   MCHC 30.7   RDW 13.5      MPV 10.2      BMP:   Recent Labs     21  0621 21  0614 21  0614    139 136   K 3.5* 3.8 4.1    101 101   CO2 21 26 27   BUN 24* 21 28*   CREATININE 1.27* 1.28* 1.46*   GLUCOSE 98 93 86   CALCIUM 8.5* 9.0 8.9   Albumin:    No results for input(s): LABALBU in the last 72 hours. SPEP:  Lab Results   Component Value Date    PROT 6.6 2021    ALBCAL 3.3 2021    ALBPCT 49 2021    LABALPH 0.2 2021    LABALPH 0.9 2021    A1PCT 3 2021    A2PCT 13 2021    LABBETA 2.1 2021    BETAPCT 31 2021    GAMGLOB 0.3 2021    GGPCT 4 2021    PATH ELECTRONICALLY SIGNED.  Alfred Goodrich M.D. 2021     UPEP:     Lab Results   Component Value Date    LABPE  2021     MONOCLONAL IMMUNOGLOBULIN IS PRESENT AND IDENTIFIED AS Urinalysis/Chemistries:      Lab Results   Component Value Date    NITRU NEGATIVE 05/29/2021    COLORU YELLOW 05/29/2021    PHUR 6.0 05/29/2021    WBCUA 5 TO 10 05/29/2021    RBCUA TOO NUMEROUS TO COUNT 05/29/2021    MUCUS NOT REPORTED 05/29/2021    TRICHOMONAS NOT REPORTED 05/29/2021    YEAST NOT REPORTED 05/29/2021    BACTERIA NOT REPORTED 05/29/2021    SPECGRAV 1.010 05/29/2021    LEUKOCYTESUR NEGATIVE 05/29/2021    UROBILINOGEN Normal 05/29/2021    BILIRUBINUR NEGATIVE 05/29/2021    GLUCOSEU NEGATIVE 05/29/2021    KETUA NEGATIVE 05/29/2021    AMORPHOUS NOT REPORTED 05/29/2021     Urine Sodium:     Lab Results   Component Value Date     05/27/2021     Urine Osmolarity:   Lab Results   Component Value Date    OSMOU 421 05/27/2021       Urine Creatinine:     Lab Results   Component Value Date    LABCREA 33.0 05/29/2021       Radiology:     Renal US  Kidneys:       The right kidney measures 10.0 x 4.0 x 3.5 cm and the left kidney measures   10.5 x 3.3 x 5.2 cm.  Cortical thickness within normal limits bilaterally.       There is diffuse increased renal parenchymal echogenicity bilaterally with   increased prominence of the relative hypoechoic renal pyramids.  A tiny 7 x 5   x 6 mm right lower pole renal cyst is noted.  No other focal renal lesion.       No hydronephrosis in either kidney.  No sonographic evidence for renal   calculi.           Bladder:       Unremarkable appearance of the bladder.  No dilated distal ureters.  The   patient did not have the urge to void.  Bilateral ureteral jets were   identified.           Impression   Diffuse increased renal parenchymal echogenicity bilaterally consistent with   medical renal disease.       No hydronephrosis in either kidney. Assessment:     1. Acute kidney injury. Her creatinine yesterday was down to 1.2 mg/dL and today it is 1.4 mg/dL most likely due to prerenal azotemia.    2.  Nephrotic range proteinuria likely from myeloma protein patient was seen and evaluated by oncology and their note reviewed. .  3. L2 fracture with canal compression: Plan for surgery on Thursday  4. New diagnosis of multiple myeloma  5. Essential hypertension: Not well controlled  6. Hypothyroidism    Plan:   1. Continue same medication  2. Restart IV fluids after midnight while patient is n.p.o.  3.  CBC and BMP in a.m.  4.  Avoid nephrotoxic medication and adjust dose based on GFR. Nutrition   Please ensure that patient is on a renal diet/TF. Avoid nephrotoxic drugs/contrast exposure. We will continue to follow along with you.        Luis Reyes MD   6/2/2021 1:59 PM    Nephrology [address] [place] [address]

## 2021-06-03 ENCOUNTER — APPOINTMENT (OUTPATIENT)
Dept: GENERAL RADIOLOGY | Age: 76
DRG: 456 | End: 2021-06-03
Attending: INTERNAL MEDICINE
Payer: MEDICARE

## 2021-06-03 ENCOUNTER — ANESTHESIA EVENT (OUTPATIENT)
Dept: OPERATING ROOM | Age: 76
DRG: 456 | End: 2021-06-03
Payer: MEDICARE

## 2021-06-03 ENCOUNTER — ANESTHESIA (OUTPATIENT)
Dept: OPERATING ROOM | Age: 76
DRG: 456 | End: 2021-06-03
Payer: MEDICARE

## 2021-06-03 VITALS
OXYGEN SATURATION: 100 % | DIASTOLIC BLOOD PRESSURE: 81 MMHG | TEMPERATURE: 96.8 F | RESPIRATION RATE: 9 BRPM | SYSTOLIC BLOOD PRESSURE: 135 MMHG

## 2021-06-03 LAB
ALLEN TEST: ABNORMAL
ANION GAP SERPL CALCULATED.3IONS-SCNC: 14 MMOL/L (ref 9–17)
BUN BLDV-MCNC: 29 MG/DL (ref 8–23)
BUN/CREAT BLD: ABNORMAL (ref 9–20)
CALCIUM IONIZED: 1.25 MMOL/L (ref 1.13–1.33)
CALCIUM SERPL-MCNC: 9.1 MG/DL (ref 8.6–10.4)
CARBOXYHEMOGLOBIN: 1 % (ref 0–5)
CHLORIDE BLD-SCNC: 102 MMOL/L (ref 98–107)
CHLORIDE, WHOLE BLOOD: 106 MMOL/L (ref 98–110)
CO2: 27 MMOL/L (ref 20–31)
CREAT SERPL-MCNC: 1.35 MG/DL (ref 0.5–0.9)
FIO2: ABNORMAL
GFR AFRICAN AMERICAN: 46 ML/MIN
GFR NON-AFRICAN AMERICAN: 38 ML/MIN
GFR SERPL CREATININE-BSD FRML MDRD: ABNORMAL ML/MIN/{1.73_M2}
GFR SERPL CREATININE-BSD FRML MDRD: ABNORMAL ML/MIN/{1.73_M2}
GLUCOSE BLD-MCNC: 90 MG/DL (ref 70–99)
GLUCOSE BLD-MCNC: 91 MG/DL (ref 65–105)
HCO3 ARTERIAL: 21.9 MMOL/L (ref 22–27)
HCT VFR BLD CALC: 27.2 %
HEMOGLOBIN: 8.7 GM/DL
METHEMOGLOBIN: ABNORMAL % (ref 0–1.5)
MODE: ABNORMAL
NEGATIVE BASE EXCESS, ART: 2.4 MMOL/L (ref 0–2)
NOTIFICATION TIME: ABNORMAL
NOTIFICATION: ABNORMAL
O2 DEVICE/FLOW/%: ABNORMAL
O2 SAT, ARTERIAL: 99.6 % (ref 94–100)
OXYHEMOGLOBIN: ABNORMAL % (ref 95–98)
PATIENT TEMP: 37
PCO2 ARTERIAL: 38.5 MMHG (ref 32–45)
PCO2, ART, TEMP ADJ: ABNORMAL (ref 32–45)
PEEP/CPAP: ABNORMAL
PH ARTERIAL: 7.37 (ref 7.35–7.45)
PH, ART, TEMP ADJ: ABNORMAL (ref 7.35–7.45)
PO2 ARTERIAL: 244 MMHG (ref 75–95)
PO2, ART, TEMP ADJ: ABNORMAL MMHG (ref 75–95)
POSITIVE BASE EXCESS, ART: ABNORMAL MMOL/L (ref 0–2)
POTASSIUM SERPL-SCNC: 3.5 MMOL/L (ref 3.7–5.3)
POTASSIUM, WHOLE BLOOD: 3.3 MMOL/L (ref 3.6–5)
PSV: ABNORMAL
PT. POSITION: ABNORMAL
RESPIRATORY RATE: ABNORMAL
SAMPLE SITE: ABNORMAL
SARS-COV-2, RAPID: NOT DETECTED
SET RATE: ABNORMAL
SODIUM BLD-SCNC: 143 MMOL/L (ref 135–144)
SODIUM, WHOLE BLOOD: 139 MMOL/L (ref 136–145)
SPECIMEN DESCRIPTION: NORMAL
TEXT FOR RESPIRATORY: ABNORMAL
TOTAL HB: ABNORMAL G/DL (ref 12–16)
TOTAL RATE: ABNORMAL
VT: ABNORMAL

## 2021-06-03 PROCEDURE — 01NB0ZZ RELEASE LUMBAR NERVE, OPEN APPROACH: ICD-10-PCS | Performed by: NEUROLOGICAL SURGERY

## 2021-06-03 PROCEDURE — 2580000003 HC RX 258: Performed by: STUDENT IN AN ORGANIZED HEALTH CARE EDUCATION/TRAINING PROGRAM

## 2021-06-03 PROCEDURE — 82330 ASSAY OF CALCIUM: CPT

## 2021-06-03 PROCEDURE — 85018 HEMOGLOBIN: CPT

## 2021-06-03 PROCEDURE — 84132 ASSAY OF SERUM POTASSIUM: CPT

## 2021-06-03 PROCEDURE — 63267 EXCISE INTRSPINL LESION LMBR: CPT | Performed by: NEUROLOGICAL SURGERY

## 2021-06-03 PROCEDURE — 6370000000 HC RX 637 (ALT 250 FOR IP): Performed by: INTERNAL MEDICINE

## 2021-06-03 PROCEDURE — 2500000003 HC RX 250 WO HCPCS: Performed by: SPECIALIST

## 2021-06-03 PROCEDURE — 2060000000 HC ICU INTERMEDIATE R&B

## 2021-06-03 PROCEDURE — 7100000001 HC PACU RECOVERY - ADDTL 15 MIN: Performed by: NEUROLOGICAL SURGERY

## 2021-06-03 PROCEDURE — 88307 TISSUE EXAM BY PATHOLOGIST: CPT

## 2021-06-03 PROCEDURE — 2780000010 HC IMPLANT OTHER: Performed by: NEUROLOGICAL SURGERY

## 2021-06-03 PROCEDURE — 88360 TUMOR IMMUNOHISTOCHEM/MANUAL: CPT

## 2021-06-03 PROCEDURE — 2580000003 HC RX 258: Performed by: SPECIALIST

## 2021-06-03 PROCEDURE — 2580000003 HC RX 258: Performed by: NURSE ANESTHETIST, CERTIFIED REGISTERED

## 2021-06-03 PROCEDURE — 0SG10K1 FUSION OF 2 OR MORE LUMBAR VERTEBRAL JOINTS WITH NONAUTOLOGOUS TISSUE SUBSTITUTE, POSTERIOR APPROACH, POSTERIOR COLUMN, OPEN APPROACH: ICD-10-PCS | Performed by: NEUROLOGICAL SURGERY

## 2021-06-03 PROCEDURE — 3209999900 FLUORO FOR SURGICAL PROCEDURES

## 2021-06-03 PROCEDURE — 6360000002 HC RX W HCPCS: Performed by: NEUROLOGICAL SURGERY

## 2021-06-03 PROCEDURE — 0QB00ZX EXCISION OF LUMBAR VERTEBRA, OPEN APPROACH, DIAGNOSTIC: ICD-10-PCS | Performed by: NEUROLOGICAL SURGERY

## 2021-06-03 PROCEDURE — 22842 INSERT SPINE FIXATION DEVICE: CPT | Performed by: NEUROLOGICAL SURGERY

## 2021-06-03 PROCEDURE — 6370000000 HC RX 637 (ALT 250 FOR IP): Performed by: NURSE PRACTITIONER

## 2021-06-03 PROCEDURE — 2720000010 HC SURG SUPPLY STERILE: Performed by: NEUROLOGICAL SURGERY

## 2021-06-03 PROCEDURE — APPSS30 APP SPLIT SHARED TIME 16-30 MINUTES: Performed by: PHYSICIAN ASSISTANT

## 2021-06-03 PROCEDURE — 6370000000 HC RX 637 (ALT 250 FOR IP): Performed by: STUDENT IN AN ORGANIZED HEALTH CARE EDUCATION/TRAINING PROGRAM

## 2021-06-03 PROCEDURE — 00UT0KZ SUPPLEMENT SPINAL MENINGES WITH NONAUTOLOGOUS TISSUE SUBSTITUTE, OPEN APPROACH: ICD-10-PCS | Performed by: NEUROLOGICAL SURGERY

## 2021-06-03 PROCEDURE — C1762 CONN TISS, HUMAN(INC FASCIA): HCPCS | Performed by: NEUROLOGICAL SURGERY

## 2021-06-03 PROCEDURE — 99232 SBSQ HOSP IP/OBS MODERATE 35: CPT | Performed by: INTERNAL MEDICINE

## 2021-06-03 PROCEDURE — 2709999900 HC NON-CHARGEABLE SUPPLY: Performed by: NEUROLOGICAL SURGERY

## 2021-06-03 PROCEDURE — C9290 INJ, BUPIVACAINE LIPOSOME: HCPCS | Performed by: NEUROLOGICAL SURGERY

## 2021-06-03 PROCEDURE — 2500000003 HC RX 250 WO HCPCS

## 2021-06-03 PROCEDURE — 6360000002 HC RX W HCPCS: Performed by: SPECIALIST

## 2021-06-03 PROCEDURE — 8E0WXBF COMPUTER ASSISTED PROCEDURE OF TRUNK REGION, WITH FLUOROSCOPY: ICD-10-PCS | Performed by: NEUROLOGICAL SURGERY

## 2021-06-03 PROCEDURE — 22612 ARTHRD PST TQ 1NTRSPC LUMBAR: CPT | Performed by: NEUROLOGICAL SURGERY

## 2021-06-03 PROCEDURE — 87086 URINE CULTURE/COLONY COUNT: CPT

## 2021-06-03 PROCEDURE — 80048 BASIC METABOLIC PNL TOTAL CA: CPT

## 2021-06-03 PROCEDURE — 2500000003 HC RX 250 WO HCPCS: Performed by: NEUROLOGICAL SURGERY

## 2021-06-03 PROCEDURE — 3600000014 HC SURGERY LEVEL 4 ADDTL 15MIN: Performed by: NEUROLOGICAL SURGERY

## 2021-06-03 PROCEDURE — 22614 ARTHRD PST TQ 1NTRSPC EA ADD: CPT | Performed by: NEUROLOGICAL SURGERY

## 2021-06-03 PROCEDURE — 82805 BLOOD GASES W/O2 SATURATION: CPT

## 2021-06-03 PROCEDURE — 7100000000 HC PACU RECOVERY - FIRST 15 MIN: Performed by: NEUROLOGICAL SURGERY

## 2021-06-03 PROCEDURE — C1713 ANCHOR/SCREW BN/BN,TIS/BN: HCPCS | Performed by: NEUROLOGICAL SURGERY

## 2021-06-03 PROCEDURE — 6360000002 HC RX W HCPCS: Performed by: NURSE ANESTHETIST, CERTIFIED REGISTERED

## 2021-06-03 PROCEDURE — 0RGA0K1 FUSION OF THORACOLUMBAR VERTEBRAL JOINT WITH NONAUTOLOGOUS TISSUE SUBSTITUTE, POSTERIOR APPROACH, POSTERIOR COLUMN, OPEN APPROACH: ICD-10-PCS | Performed by: NEUROLOGICAL SURGERY

## 2021-06-03 PROCEDURE — 2580000003 HC RX 258: Performed by: NEUROLOGICAL SURGERY

## 2021-06-03 PROCEDURE — 36415 COLL VENOUS BLD VENIPUNCTURE: CPT

## 2021-06-03 PROCEDURE — 3700000000 HC ANESTHESIA ATTENDED CARE: Performed by: NEUROLOGICAL SURGERY

## 2021-06-03 PROCEDURE — 3600000004 HC SURGERY LEVEL 4 BASE: Performed by: NEUROLOGICAL SURGERY

## 2021-06-03 PROCEDURE — 6360000002 HC RX W HCPCS: Performed by: INTERNAL MEDICINE

## 2021-06-03 PROCEDURE — 6360000002 HC RX W HCPCS: Performed by: STUDENT IN AN ORGANIZED HEALTH CARE EDUCATION/TRAINING PROGRAM

## 2021-06-03 PROCEDURE — 85014 HEMATOCRIT: CPT

## 2021-06-03 PROCEDURE — 2580000003 HC RX 258

## 2021-06-03 PROCEDURE — 87635 SARS-COV-2 COVID-19 AMP PRB: CPT

## 2021-06-03 PROCEDURE — 3700000001 HC ADD 15 MINUTES (ANESTHESIA): Performed by: NEUROLOGICAL SURGERY

## 2021-06-03 PROCEDURE — 6370000000 HC RX 637 (ALT 250 FOR IP): Performed by: NEUROLOGICAL SURGERY

## 2021-06-03 DEVICE — SCREW 54840046545 4.75 ATS MAS 6.5X45
Type: IMPLANTABLE DEVICE | Site: SPINE LUMBAR | Status: FUNCTIONAL
Brand: CD HORIZON® SPINAL SYSTEM

## 2021-06-03 DEVICE — DBM 7509215 MAGNIFUSE 1 X 5CM
Type: IMPLANTABLE DEVICE | Site: SPINE LUMBAR | Status: FUNCTIONAL
Brand: MAGNIFUSE® BONE GRAFT

## 2021-06-03 DEVICE — DBM 7509211 MAGNIFUSE 1 X 10CM
Type: IMPLANTABLE DEVICE | Site: SPINE LUMBAR | Status: FUNCTIONAL
Brand: MAGNIFUSE® BONE GRAFT

## 2021-06-03 DEVICE — COLLAGEN DURAL REGENERATION MEMBRANE 2IN X 2IN (5CM X 5CM)
Type: IMPLANTABLE DEVICE | Site: SPINE LUMBAR | Status: FUNCTIONAL
Brand: DURAMATRIX-ONLAY PLUS

## 2021-06-03 DEVICE — GRAFT BNE VIABLE 5CC TIM MTRX FIBERCEL: Type: IMPLANTABLE DEVICE | Site: SPINE LUMBAR | Status: FUNCTIONAL

## 2021-06-03 RX ORDER — LIDOCAINE HYDROCHLORIDE 10 MG/ML
INJECTION, SOLUTION EPIDURAL; INFILTRATION; INTRACAUDAL; PERINEURAL PRN
Status: DISCONTINUED | OUTPATIENT
Start: 2021-06-03 | End: 2021-06-03 | Stop reason: SDUPTHER

## 2021-06-03 RX ORDER — PHENYLEPHRINE HCL IN 0.9% NACL 1 MG/10 ML
SYRINGE (ML) INTRAVENOUS PRN
Status: DISCONTINUED | OUTPATIENT
Start: 2021-06-03 | End: 2021-06-03 | Stop reason: SDUPTHER

## 2021-06-03 RX ORDER — MAGNESIUM HYDROXIDE 1200 MG/15ML
LIQUID ORAL CONTINUOUS PRN
Status: COMPLETED | OUTPATIENT
Start: 2021-06-03 | End: 2021-06-03

## 2021-06-03 RX ORDER — ONDANSETRON 2 MG/ML
4 INJECTION INTRAMUSCULAR; INTRAVENOUS
Status: DISCONTINUED | OUTPATIENT
Start: 2021-06-03 | End: 2021-06-03 | Stop reason: HOSPADM

## 2021-06-03 RX ORDER — SODIUM CHLORIDE, SODIUM LACTATE, POTASSIUM CHLORIDE, CALCIUM CHLORIDE 600; 310; 30; 20 MG/100ML; MG/100ML; MG/100ML; MG/100ML
INJECTION, SOLUTION INTRAVENOUS CONTINUOUS PRN
Status: DISCONTINUED | OUTPATIENT
Start: 2021-06-03 | End: 2021-06-03 | Stop reason: SDUPTHER

## 2021-06-03 RX ORDER — PROPOFOL 10 MG/ML
INJECTION, EMULSION INTRAVENOUS PRN
Status: DISCONTINUED | OUTPATIENT
Start: 2021-06-03 | End: 2021-06-03 | Stop reason: SDUPTHER

## 2021-06-03 RX ORDER — LIDOCAINE HYDROCHLORIDE AND EPINEPHRINE 10; 10 MG/ML; UG/ML
INJECTION, SOLUTION INFILTRATION; PERINEURAL PRN
Status: DISCONTINUED | OUTPATIENT
Start: 2021-06-03 | End: 2021-06-03 | Stop reason: ALTCHOICE

## 2021-06-03 RX ORDER — POTASSIUM CHLORIDE 7.45 MG/ML
10 INJECTION INTRAVENOUS
Status: DISPENSED | OUTPATIENT
Start: 2021-06-03 | End: 2021-06-03

## 2021-06-03 RX ORDER — ROCURONIUM BROMIDE 10 MG/ML
INJECTION, SOLUTION INTRAVENOUS PRN
Status: DISCONTINUED | OUTPATIENT
Start: 2021-06-03 | End: 2021-06-03 | Stop reason: SDUPTHER

## 2021-06-03 RX ORDER — METHOCARBAMOL 500 MG/1
500 TABLET, FILM COATED ORAL EVERY 8 HOURS
Status: DISCONTINUED | OUTPATIENT
Start: 2021-06-03 | End: 2021-06-10 | Stop reason: HOSPADM

## 2021-06-03 RX ORDER — FENTANYL CITRATE 50 UG/ML
50 INJECTION, SOLUTION INTRAMUSCULAR; INTRAVENOUS EVERY 5 MIN PRN
Status: DISCONTINUED | OUTPATIENT
Start: 2021-06-03 | End: 2021-06-03 | Stop reason: HOSPADM

## 2021-06-03 RX ORDER — NALOXONE HYDROCHLORIDE 0.4 MG/ML
0.4 INJECTION, SOLUTION INTRAMUSCULAR; INTRAVENOUS; SUBCUTANEOUS PRN
Status: DISCONTINUED | OUTPATIENT
Start: 2021-06-03 | End: 2021-06-06

## 2021-06-03 RX ORDER — FENTANYL CITRATE 50 UG/ML
25 INJECTION, SOLUTION INTRAMUSCULAR; INTRAVENOUS EVERY 5 MIN PRN
Status: DISCONTINUED | OUTPATIENT
Start: 2021-06-03 | End: 2021-06-03 | Stop reason: HOSPADM

## 2021-06-03 RX ORDER — PROPOFOL 10 MG/ML
INJECTION, EMULSION INTRAVENOUS CONTINUOUS PRN
Status: DISCONTINUED | OUTPATIENT
Start: 2021-06-03 | End: 2021-06-03 | Stop reason: SDUPTHER

## 2021-06-03 RX ORDER — CEFAZOLIN SODIUM 2 G/50ML
SOLUTION INTRAVENOUS PRN
Status: DISCONTINUED | OUTPATIENT
Start: 2021-06-03 | End: 2021-06-03

## 2021-06-03 RX ORDER — FENTANYL CITRATE 50 UG/ML
INJECTION, SOLUTION INTRAMUSCULAR; INTRAVENOUS PRN
Status: DISCONTINUED | OUTPATIENT
Start: 2021-06-03 | End: 2021-06-03 | Stop reason: SDUPTHER

## 2021-06-03 RX ORDER — CEFAZOLIN SODIUM 2 G/50ML
SOLUTION INTRAVENOUS PRN
Status: DISCONTINUED | OUTPATIENT
Start: 2021-06-03 | End: 2021-06-03 | Stop reason: SDUPTHER

## 2021-06-03 RX ORDER — CEFAZOLIN SODIUM 1 G/3ML
INJECTION, POWDER, FOR SOLUTION INTRAMUSCULAR; INTRAVENOUS PRN
Status: DISCONTINUED | OUTPATIENT
Start: 2021-06-03 | End: 2021-06-03 | Stop reason: SDUPTHER

## 2021-06-03 RX ORDER — OXYCODONE HYDROCHLORIDE AND ACETAMINOPHEN 5; 325 MG/1; MG/1
1 TABLET ORAL EVERY 4 HOURS PRN
Status: DISCONTINUED | OUTPATIENT
Start: 2021-06-03 | End: 2021-06-07

## 2021-06-03 RX ORDER — MEPERIDINE HYDROCHLORIDE 50 MG/ML
12.5 INJECTION INTRAMUSCULAR; INTRAVENOUS; SUBCUTANEOUS EVERY 5 MIN PRN
Status: DISCONTINUED | OUTPATIENT
Start: 2021-06-03 | End: 2021-06-03 | Stop reason: HOSPADM

## 2021-06-03 RX ORDER — POTASSIUM CHLORIDE 7.45 MG/ML
20 INJECTION INTRAVENOUS ONCE
Status: DISCONTINUED | OUTPATIENT
Start: 2021-06-03 | End: 2021-06-03 | Stop reason: CLARIF

## 2021-06-03 RX ORDER — VANCOMYCIN HYDROCHLORIDE 1 G/20ML
INJECTION, POWDER, LYOPHILIZED, FOR SOLUTION INTRAVENOUS PRN
Status: DISCONTINUED | OUTPATIENT
Start: 2021-06-03 | End: 2021-06-03 | Stop reason: ALTCHOICE

## 2021-06-03 RX ORDER — DEXAMETHASONE SODIUM PHOSPHATE 10 MG/ML
INJECTION INTRAMUSCULAR; INTRAVENOUS PRN
Status: DISCONTINUED | OUTPATIENT
Start: 2021-06-03 | End: 2021-06-03 | Stop reason: SDUPTHER

## 2021-06-03 RX ADMIN — Medication 100 MCG: at 15:55

## 2021-06-03 RX ADMIN — SODIUM CHLORIDE: 9 INJECTION, SOLUTION INTRAVENOUS at 22:39

## 2021-06-03 RX ADMIN — CEFAZOLIN 1000 MG: 1 INJECTION, POWDER, FOR SOLUTION INTRAMUSCULAR; INTRAVENOUS at 18:25

## 2021-06-03 RX ADMIN — ROCURONIUM BROMIDE 50 MG: 10 INJECTION INTRAVENOUS at 13:49

## 2021-06-03 RX ADMIN — AMLODIPINE BESYLATE 10 MG: 10 TABLET ORAL at 08:27

## 2021-06-03 RX ADMIN — POTASSIUM CHLORIDE 10 MEQ: 7.46 INJECTION, SOLUTION INTRAVENOUS at 10:16

## 2021-06-03 RX ADMIN — OXYCODONE HYDROCHLORIDE AND ACETAMINOPHEN 2 TABLET: 5; 325 TABLET ORAL at 03:13

## 2021-06-03 RX ADMIN — FENTANYL CITRATE 100 MCG: 50 INJECTION, SOLUTION INTRAMUSCULAR; INTRAVENOUS at 13:49

## 2021-06-03 RX ADMIN — SODIUM CHLORIDE, POTASSIUM CHLORIDE, SODIUM LACTATE AND CALCIUM CHLORIDE: 600; 310; 30; 20 INJECTION, SOLUTION INTRAVENOUS at 13:58

## 2021-06-03 RX ADMIN — PHENYLEPHRINE HYDROCHLORIDE 25 MCG/MIN: 10 INJECTION INTRAVENOUS at 15:11

## 2021-06-03 RX ADMIN — SODIUM CHLORIDE, POTASSIUM CHLORIDE, SODIUM LACTATE AND CALCIUM CHLORIDE: 600; 310; 30; 20 INJECTION, SOLUTION INTRAVENOUS at 13:41

## 2021-06-03 RX ADMIN — METHOCARBAMOL TABLETS 500 MG: 500 TABLET, COATED ORAL at 22:55

## 2021-06-03 RX ADMIN — LIDOCAINE HYDROCHLORIDE 50 ML: 10 INJECTION, SOLUTION EPIDURAL; INFILTRATION; INTRACAUDAL; PERINEURAL at 13:49

## 2021-06-03 RX ADMIN — LEVOTHYROXINE SODIUM 50 MCG: 50 TABLET ORAL at 06:26

## 2021-06-03 RX ADMIN — SODIUM CHLORIDE 0.5 MCG/KG/HR: 900 INJECTION, SOLUTION INTRAVENOUS at 14:10

## 2021-06-03 RX ADMIN — DEXAMETHASONE SODIUM PHOSPHATE 10 MG: 10 INJECTION INTRAMUSCULAR; INTRAVENOUS at 18:30

## 2021-06-03 RX ADMIN — MORPHINE SULFATE 2 MG: 2 INJECTION, SOLUTION INTRAMUSCULAR; INTRAVENOUS at 09:06

## 2021-06-03 RX ADMIN — Medication 100 MCG: at 19:39

## 2021-06-03 RX ADMIN — CEFAZOLIN SODIUM 1000 MG: 2 SOLUTION INTRAVENOUS at 14:35

## 2021-06-03 RX ADMIN — ROCURONIUM BROMIDE 30 MG: 10 INJECTION INTRAVENOUS at 14:52

## 2021-06-03 RX ADMIN — PROPOFOL 180 MCG/KG/MIN: 10 INJECTION, EMULSION INTRAVENOUS at 14:10

## 2021-06-03 RX ADMIN — PROPOFOL 150 MG: 10 INJECTION, EMULSION INTRAVENOUS at 13:49

## 2021-06-03 RX ADMIN — SODIUM CHLORIDE, POTASSIUM CHLORIDE, SODIUM LACTATE AND CALCIUM CHLORIDE: 600; 310; 30; 20 INJECTION, SOLUTION INTRAVENOUS at 18:34

## 2021-06-03 RX ADMIN — DEXTROSE MONOHYDRATE 2000 MG: 50 INJECTION, SOLUTION INTRAVENOUS at 22:45

## 2021-06-03 ASSESSMENT — PULMONARY FUNCTION TESTS
PIF_VALUE: 15
PIF_VALUE: 16
PIF_VALUE: 13
PIF_VALUE: 19
PIF_VALUE: 13
PIF_VALUE: 18
PIF_VALUE: 0
PIF_VALUE: 14
PIF_VALUE: 17
PIF_VALUE: 16
PIF_VALUE: 14
PIF_VALUE: 18
PIF_VALUE: 17
PIF_VALUE: 15
PIF_VALUE: 14
PIF_VALUE: 17
PIF_VALUE: 18
PIF_VALUE: 14
PIF_VALUE: 14
PIF_VALUE: 17
PIF_VALUE: 14
PIF_VALUE: 14
PIF_VALUE: 19
PIF_VALUE: 13
PIF_VALUE: 12
PIF_VALUE: 15
PIF_VALUE: 17
PIF_VALUE: 16
PIF_VALUE: 14
PIF_VALUE: 19
PIF_VALUE: 2
PIF_VALUE: 18
PIF_VALUE: 16
PIF_VALUE: 17
PIF_VALUE: 14
PIF_VALUE: 13
PIF_VALUE: 18
PIF_VALUE: 14
PIF_VALUE: 17
PIF_VALUE: 13
PIF_VALUE: 17
PIF_VALUE: 18
PIF_VALUE: 19
PIF_VALUE: 14
PIF_VALUE: 16
PIF_VALUE: 16
PIF_VALUE: 14
PIF_VALUE: 18
PIF_VALUE: 17
PIF_VALUE: 16
PIF_VALUE: 19
PIF_VALUE: 19
PIF_VALUE: 17
PIF_VALUE: 19
PIF_VALUE: 14
PIF_VALUE: 14
PIF_VALUE: 15
PIF_VALUE: 14
PIF_VALUE: 14
PIF_VALUE: 19
PIF_VALUE: 13
PIF_VALUE: 18
PIF_VALUE: 13
PIF_VALUE: 18
PIF_VALUE: 18
PIF_VALUE: 17
PIF_VALUE: 17
PIF_VALUE: 18
PIF_VALUE: 17
PIF_VALUE: 18
PIF_VALUE: 14
PIF_VALUE: 13
PIF_VALUE: 16
PIF_VALUE: 14
PIF_VALUE: 14
PIF_VALUE: 19
PIF_VALUE: 14
PIF_VALUE: 17
PIF_VALUE: 18
PIF_VALUE: 17
PIF_VALUE: 18
PIF_VALUE: 15
PIF_VALUE: 14
PIF_VALUE: 18
PIF_VALUE: 18
PIF_VALUE: 12
PIF_VALUE: 18
PIF_VALUE: 14
PIF_VALUE: 18
PIF_VALUE: 16
PIF_VALUE: 14
PIF_VALUE: 15
PIF_VALUE: 14
PIF_VALUE: 18
PIF_VALUE: 18
PIF_VALUE: 12
PIF_VALUE: 14
PIF_VALUE: 18
PIF_VALUE: 19
PIF_VALUE: 12
PIF_VALUE: 13
PIF_VALUE: 16
PIF_VALUE: 18
PIF_VALUE: 19
PIF_VALUE: 13
PIF_VALUE: 27
PIF_VALUE: 19
PIF_VALUE: 14
PIF_VALUE: 17
PIF_VALUE: 14
PIF_VALUE: 18
PIF_VALUE: 17
PIF_VALUE: 18
PIF_VALUE: 17
PIF_VALUE: 17
PIF_VALUE: 20
PIF_VALUE: 13
PIF_VALUE: 13
PIF_VALUE: 14
PIF_VALUE: 18
PIF_VALUE: 15
PIF_VALUE: 14
PIF_VALUE: 17
PIF_VALUE: 19
PIF_VALUE: 18
PIF_VALUE: 18
PIF_VALUE: 17
PIF_VALUE: 14
PIF_VALUE: 19
PIF_VALUE: 14
PIF_VALUE: 14
PIF_VALUE: 3
PIF_VALUE: 14
PIF_VALUE: 18
PIF_VALUE: 25
PIF_VALUE: 14
PIF_VALUE: 14
PIF_VALUE: 15
PIF_VALUE: 14
PIF_VALUE: 17
PIF_VALUE: 17
PIF_VALUE: 14
PIF_VALUE: 15
PIF_VALUE: 14
PIF_VALUE: 14
PIF_VALUE: 18
PIF_VALUE: 14
PIF_VALUE: 15
PIF_VALUE: 16
PIF_VALUE: 18
PIF_VALUE: 14
PIF_VALUE: 0
PIF_VALUE: 17
PIF_VALUE: 18
PIF_VALUE: 19
PIF_VALUE: 17
PIF_VALUE: 16
PIF_VALUE: 14
PIF_VALUE: 17
PIF_VALUE: 15
PIF_VALUE: 18
PIF_VALUE: 18
PIF_VALUE: 17
PIF_VALUE: 3
PIF_VALUE: 14
PIF_VALUE: 15
PIF_VALUE: 3
PIF_VALUE: 15
PIF_VALUE: 14
PIF_VALUE: 14
PIF_VALUE: 13
PIF_VALUE: 19
PIF_VALUE: 16
PIF_VALUE: 18
PIF_VALUE: 17
PIF_VALUE: 15
PIF_VALUE: 18
PIF_VALUE: 16
PIF_VALUE: 18
PIF_VALUE: 13
PIF_VALUE: 18
PIF_VALUE: 14
PIF_VALUE: 17
PIF_VALUE: 3
PIF_VALUE: 6
PIF_VALUE: 15
PIF_VALUE: 15
PIF_VALUE: 16
PIF_VALUE: 14
PIF_VALUE: 14
PIF_VALUE: 17
PIF_VALUE: 14
PIF_VALUE: 12
PIF_VALUE: 14
PIF_VALUE: 15
PIF_VALUE: 18
PIF_VALUE: 17
PIF_VALUE: 18
PIF_VALUE: 14
PIF_VALUE: 18
PIF_VALUE: 14
PIF_VALUE: 14
PIF_VALUE: 18
PIF_VALUE: 15
PIF_VALUE: 16
PIF_VALUE: 18
PIF_VALUE: 14
PIF_VALUE: 18
PIF_VALUE: 13
PIF_VALUE: 15
PIF_VALUE: 15
PIF_VALUE: 17
PIF_VALUE: 19
PIF_VALUE: 14
PIF_VALUE: 17
PIF_VALUE: 15
PIF_VALUE: 18
PIF_VALUE: 17
PIF_VALUE: 14
PIF_VALUE: 14
PIF_VALUE: 0
PIF_VALUE: 17
PIF_VALUE: 14
PIF_VALUE: 3
PIF_VALUE: 14
PIF_VALUE: 17
PIF_VALUE: 14
PIF_VALUE: 14
PIF_VALUE: 12
PIF_VALUE: 12
PIF_VALUE: 14
PIF_VALUE: 15
PIF_VALUE: 17
PIF_VALUE: 18
PIF_VALUE: 14
PIF_VALUE: 14
PIF_VALUE: 17
PIF_VALUE: 16
PIF_VALUE: 17
PIF_VALUE: 14
PIF_VALUE: 19
PIF_VALUE: 18
PIF_VALUE: 19
PIF_VALUE: 13
PIF_VALUE: 18
PIF_VALUE: 17
PIF_VALUE: 15
PIF_VALUE: 18
PIF_VALUE: 14
PIF_VALUE: 19
PIF_VALUE: 18
PIF_VALUE: 18
PIF_VALUE: 17
PIF_VALUE: 14
PIF_VALUE: 13
PIF_VALUE: 17
PIF_VALUE: 14
PIF_VALUE: 18
PIF_VALUE: 14
PIF_VALUE: 15
PIF_VALUE: 14
PIF_VALUE: 15
PIF_VALUE: 14
PIF_VALUE: 17
PIF_VALUE: 18
PIF_VALUE: 12
PIF_VALUE: 19
PIF_VALUE: 13
PIF_VALUE: 15
PIF_VALUE: 18
PIF_VALUE: 13
PIF_VALUE: 2
PIF_VALUE: 18
PIF_VALUE: 14
PIF_VALUE: 0
PIF_VALUE: 14
PIF_VALUE: 17
PIF_VALUE: 13
PIF_VALUE: 13
PIF_VALUE: 14
PIF_VALUE: 18
PIF_VALUE: 14
PIF_VALUE: 18
PIF_VALUE: 18
PIF_VALUE: 17
PIF_VALUE: 13
PIF_VALUE: 12
PIF_VALUE: 14
PIF_VALUE: 14
PIF_VALUE: 15
PIF_VALUE: 15
PIF_VALUE: 18
PIF_VALUE: 18
PIF_VALUE: 8
PIF_VALUE: 14
PIF_VALUE: 14
PIF_VALUE: 18
PIF_VALUE: 15
PIF_VALUE: 18
PIF_VALUE: 16
PIF_VALUE: 17
PIF_VALUE: 14
PIF_VALUE: 16
PIF_VALUE: 13
PIF_VALUE: 14
PIF_VALUE: 15
PIF_VALUE: 14
PIF_VALUE: 19
PIF_VALUE: 14
PIF_VALUE: 19
PIF_VALUE: 12
PIF_VALUE: 18
PIF_VALUE: 14
PIF_VALUE: 14
PIF_VALUE: 17
PIF_VALUE: 13
PIF_VALUE: 19
PIF_VALUE: 19
PIF_VALUE: 15
PIF_VALUE: 18
PIF_VALUE: 18
PIF_VALUE: 16
PIF_VALUE: 14
PIF_VALUE: 18
PIF_VALUE: 2
PIF_VALUE: 12
PIF_VALUE: 14
PIF_VALUE: 13
PIF_VALUE: 18
PIF_VALUE: 14
PIF_VALUE: 15
PIF_VALUE: 14
PIF_VALUE: 18
PIF_VALUE: 19
PIF_VALUE: 14
PIF_VALUE: 2
PIF_VALUE: 18
PIF_VALUE: 13
PIF_VALUE: 14
PIF_VALUE: 17
PIF_VALUE: 19
PIF_VALUE: 14
PIF_VALUE: 14
PIF_VALUE: 17
PIF_VALUE: 13
PIF_VALUE: 13
PIF_VALUE: 15
PIF_VALUE: 16
PIF_VALUE: 15
PIF_VALUE: 19
PIF_VALUE: 18
PIF_VALUE: 16
PIF_VALUE: 14
PIF_VALUE: 16
PIF_VALUE: 18
PIF_VALUE: 17
PIF_VALUE: 16
PIF_VALUE: 17
PIF_VALUE: 14
PIF_VALUE: 14
PIF_VALUE: 13
PIF_VALUE: 16
PIF_VALUE: 14

## 2021-06-03 ASSESSMENT — PAIN - FUNCTIONAL ASSESSMENT
PAIN_FUNCTIONAL_ASSESSMENT: PREVENTS OR INTERFERES SOME ACTIVE ACTIVITIES AND ADLS
PAIN_FUNCTIONAL_ASSESSMENT: 0-10

## 2021-06-03 ASSESSMENT — ENCOUNTER SYMPTOMS
NAUSEA: 0
SHORTNESS OF BREATH: 0
COUGH: 0
VOMITING: 0
CONSTIPATION: 1
BACK PAIN: 1
ABDOMINAL PAIN: 0

## 2021-06-03 ASSESSMENT — PAIN DESCRIPTION - PAIN TYPE: TYPE: ACUTE PAIN

## 2021-06-03 ASSESSMENT — PAIN DESCRIPTION - FREQUENCY: FREQUENCY: CONTINUOUS

## 2021-06-03 ASSESSMENT — PAIN DESCRIPTION - PROGRESSION: CLINICAL_PROGRESSION: NOT CHANGED

## 2021-06-03 ASSESSMENT — PAIN SCALES - GENERAL
PAINLEVEL_OUTOF10: 5
PAINLEVEL_OUTOF10: 9
PAINLEVEL_OUTOF10: 8

## 2021-06-03 ASSESSMENT — PAIN DESCRIPTION - DESCRIPTORS: DESCRIPTORS: ACHING

## 2021-06-03 ASSESSMENT — PAIN DESCRIPTION - LOCATION: LOCATION: BACK

## 2021-06-03 ASSESSMENT — PAIN DESCRIPTION - ORIENTATION: ORIENTATION: LOWER

## 2021-06-03 ASSESSMENT — PAIN DESCRIPTION - ONSET: ONSET: ON-GOING

## 2021-06-03 NOTE — ANESTHESIA PRE PROCEDURE
metoprolol (LOPRESSOR) injection 5 mg  5 mg Intravenous Q6H PRN Michelle Razo DO        [facility] Hold] polyethylene glycol (GLYCOLAX) packet 17 g  17 g Oral Daily Aleckiloaaron Fisher DO   17 g at 06/02/21 0840    [MAR Hold] amLODIPine (NORVASC) tablet 10 mg  10 mg Oral Daily Danyel Mendoza MD   10 mg at 06/03/21 0827    [MAR Hold] docusate sodium (COLACE) capsule 200 mg  200 mg Oral BID Julia Restrepo MD   200 mg at 06/02/21 2057    [MAR Hold] sennosides-docusate sodium (SENOKOT-S) 8.6-50 MG tablet 2 tablet  2 tablet Oral Daily Julia Restrepo MD   2 tablet at 06/02/21 0840    [MAR Hold] oxyCODONE-acetaminophen (PERCOCET) 5-325 MG per tablet 1 tablet  1 tablet Oral Q4H PRN Julia Restrepo MD        Or    [MAR Hold] oxyCODONE-acetaminophen (PERCOCET) 5-325 MG per tablet 2 tablet  2 tablet Oral Q4H PRN Julia Restrepo MD   2 tablet at 06/03/21 0313    [MAR Hold] morphine (PF) injection 2 mg  2 mg Intravenous Q4H PRN Julia Restrepo MD   2 mg at 06/03/21 0906    [MAR Hold] traMADol (ULTRAM) tablet 50 mg  50 mg Oral Q6H PRN Julia Restrepo MD   50 mg at 06/01/21 2212    [MAR Hold] sodium chloride flush 0.9 % injection 5-40 mL  5-40 mL Intravenous 2 times per day Susannah Samayoa MD   10 mL at 06/02/21 2057    [MAR Hold] sodium chloride flush 0.9 % injection 5-40 mL  5-40 mL Intravenous PRN Susannah Samayoa MD        [facility] Hold] 0.9 % sodium chloride infusion  25 mL Intravenous PRN Susannah Samayoa MD        [facility] Hold] nicotine (NICODERM CQ) 21 MG/24HR 1 patch  1 patch Transdermal Daily PRN Susannah Samayoa MD        [facility] Hold] acetaminophen (TYLENOL) tablet 650 mg  650 mg Oral Q6H PRN Susannah Samayoa MD   650 mg at 05/27/21 2147    Or    [MAR Hold] acetaminophen (TYLENOL) suppository 650 mg  650 mg Rectal Q6H PRN Susannah Samayoa MD        [facility] Hold] heparin (porcine) injection 5,000 Units  5,000 Units Subcutaneous 3 times per day Susannah Samayoa MD   5,000 Units at 06/02/21 2058    [MAR Hold] latanoprost (XALATAN) 0.005 % ophthalmic solution 1 drop  1 drop Both Eyes Nightly Eden Tan APRN - NP   1 drop at 06/02/21 2057    [MAR Hold] levothyroxine (SYNTHROID) tablet 50 mcg  50 mcg Oral Daily Eden Tan, APRN - NP   50 mcg at 06/03/21 0626    [MAR Hold] atorvastatin (LIPITOR) tablet 20 mg  20 mg Oral Daily Eden Tan, APRN - NP   20 mg at 06/02/21 1835    [MAR Hold] sodium chloride flush 0.9 % injection 10 mL  10 mL Intravenous PRN Martha Martinez, DO           Allergies:  No Known Allergies    Problem List:    Patient Active Problem List   Diagnosis Code    Acute renal failure (ARF) ([facility] [address] N17.9    Compression fracture of lumbar vertebra ([address] S32.000A    Essential hypertension I10    Other hyperlipidemia E78.49    Subclinical hypothyroidism E03.9    Pathologic lumbar vertebral fracture M84.48XA    Normocytic normochromic anemia D64.9    Multiple myeloma not having achieved remission ([facility]) C90.00    Moderate protein-calorie malnutrition ([address] E44.0    Spinal cord compression due to malignant neoplasm metastatic to spine ([facility]) G95.29, C79.51    BMI less than 19,adult Z68.1    Hypokalemia E87.6       Past Medical History:        Diagnosis Date    Acute renal failure (ARF) ([address] 5/25/2021    Compression fracture of lumbar vertebra ([address] 5/27/2021    Essential hypertension 5/27/2021    Other hyperlipidemia 5/27/2021    Other specified hypothyroidism 5/27/2021       Past Surgical History:        Procedure Laterality Date    FINGER TRIGGER RELEASE Left        Social History:    Social History     Tobacco Use    Smoking status: Never Smoker    Smokeless tobacco: Never Used   Substance Use Topics    Alcohol use: Not Currently                                Counseling given: Not Answered      Vital Signs (Current):   Vitals:    06/02/21 2045 06/02/21 2142 06/03/21 0808 06/03/21 1225   BP: 137/81 (!) 145/70 (!) 146/73 (!) 149/75   Pulse: 91  83 90   Resp: 16 18 14 20   Temp: 98.9 °F (37.2 °C) 98.4 °F (36.9 °C) 97.9 °F (36.6 °C) 98.1 °F (36.7 °C)   TempSrc: Oral Oral Oral Temporal   SpO2: 94% 93% 94% 94%   Weight:    92 lb (41.7 kg)   Height:    5' (1.524 m)                                              BP Readings from Last 3 Encounters:   06/03/21 (!) 149/75       NPO Status: Time of last liquid consumption: 1900                        Time of last solid consumption: 1900                        Date of last liquid consumption: 06/02/21                        Date of last solid food consumption: 06/02/21    BMI:   Wt Readings from Last 3 Encounters:   06/03/21 92 lb (41.7 kg)     Body mass index is 17.97 kg/m². CBC:   Lab Results   Component Value Date    WBC 8.3 06/01/2021    RBC 3.07 06/01/2021    HGB 8.7 06/01/2021    HCT 28.3 06/01/2021    MCV 92.2 06/01/2021    RDW 13.5 06/01/2021     06/01/2021       CMP:   Lab Results   Component Value Date     06/03/2021    K 3.5 06/03/2021     06/03/2021    CO2 27 06/03/2021    BUN 29 06/03/2021    CREATININE 1.35 06/03/2021    GFRAA 46 06/03/2021    LABGLOM 38 06/03/2021    GLUCOSE 90 06/03/2021    PROT 6.6 05/29/2021    CALCIUM 9.1 06/03/2021    BILITOT 0.15 05/29/2021    ALKPHOS 43 05/29/2021    AST 15 05/29/2021    ALT 9 05/29/2021       POC Tests: No results for input(s): POCGLU, POCNA, POCK, POCCL, POCBUN, POCHEMO, POCHCT in the last 72 hours.     Coags:   Lab Results   Component Value Date    PROTIME 12.4 05/27/2021    INR 1.2 05/27/2021       HCG (If Applicable): No results found for: PREGTESTUR, PREGSERUM, HCG, HCGQUANT     ABGs: No results found for: PHART, PO2ART, XMC0IFA, OOH3BPF, BEART, C3VZZRXA     Type & Screen (If Applicable):  No results found for: LABABO, LABRH    Drug/Infectious Status (If Applicable):  No results found for: HIV, HEPCAB    COVID-19 Screening (If Applicable):   Lab Results   Component Value Date    COVID19 Not Detected 06/03/2021     EKG 6/1/2021  Sinus tachycardia  Cannot rule out Anterior infarct , age undetermined  Abnormal ECG No previous ECGs available  TTE 6/2/2021  Global left ventricular systolic function is normal, estimated EF 60-65%  Calculated ejection fraction 62% by Heart Model. No segmental wall motion abnormalities  Grade I (mild) left ventricular diastolic dysfunction. Mild mitral regurgitation. Mild aortic insufficiency. No significant pericardial effusion is seen. Anesthesia Evaluation    Airway: Mallampati: III  TM distance: >3 FB   Neck ROM: full  Mouth opening: > = 3 FB Dental: normal exam   (+) caps      Pulmonary:Negative Pulmonary ROS and normal exam                               Cardiovascular:    (+) hypertension:,                   Neuro/Psych:   Negative Neuro/Psych ROS              GI/Hepatic/Renal:             Endo/Other:    (+) hypothyroidism::., .                 Abdominal:           Vascular: negative vascular ROS. Anesthesia Plan      general     ASA 3       Induction: intravenous. arterial line  MIPS: Postoperative opioids intended. Anesthetic plan and risks discussed with patient. Plan discussed with CRNA.                   Raquel Treviño MD   6/3/2021

## 2021-06-03 NOTE — PROGRESS NOTES
RAPID Covid 19 swab taken from left nare, labeled, placed in red dot bag, and sent to laboratory per hospital policy and procedure. Patient tolerated procedure well.

## 2021-06-03 NOTE — PLAN OF CARE
Problem: Skin Integrity:  Goal: Will show no infection signs and symptoms  Description: Will show no infection signs and symptoms  6/3/2021 0120 by Su Jain RN  Outcome: Ongoing  6/2/2021 1611 by Huy Casey RN  Outcome: Ongoing  Goal: Absence of new skin breakdown  Description: Absence of new skin breakdown  6/3/2021 0120 by Su Jain RN  Outcome: Ongoing  6/2/2021 1611 by Huy Casey RN  Outcome: Ongoing     Problem: Skin Integrity:  Goal: Will show no infection signs and symptoms  Description: Will show no infection signs and symptoms  6/3/2021 0120 by Su Jain RN  Outcome: Ongoing  6/2/2021 1611 by Huy Casey RN  Outcome: Ongoing  Goal: Absence of new skin breakdown  Description: Absence of new skin breakdown  6/3/2021 0120 by Su Jain RN  Outcome: Ongoing  6/2/2021 1611 by Huy Casey RN  Outcome: Ongoing     Problem: Skin Integrity:  Goal: Will show no infection signs and symptoms  Description: Will show no infection signs and symptoms  6/3/2021 0120 by Su Jain RN  Outcome: Ongoing  6/2/2021 1611 by Huy Casey RN  Outcome: Ongoing  Goal: Absence of new skin breakdown  Description: Absence of new skin breakdown  6/3/2021 0120 by Su Jain RN  Outcome: Ongoing  6/2/2021 1611 by Huy Casey RN  Outcome: Ongoing     Problem: Skin Integrity:  Goal: Will show no infection signs and symptoms  Description: Will show no infection signs and symptoms  6/3/2021 0120 by Su Jain RN  Outcome: Ongoing  6/2/2021 1611 by Huy Casey RN  Outcome: Ongoing  Goal: Absence of new skin breakdown  Description: Absence of new skin breakdown  6/3/2021 0120 by Su Jain RN  Outcome: Ongoing  6/2/2021 1611 by Huy Casey RN  Outcome: Ongoing     Problem: Skin Integrity:  Goal: Will show no infection signs and symptoms  Description: Will show no infection signs and symptoms  6/3/2021 0120 by Su Jain RN Outcome: Ongoing  6/2/2021 1611 by Polo Epley, RN  Outcome: Ongoing  Goal: Absence of new skin breakdown  Description: Absence of new skin breakdown  6/3/2021 0120 by Janet Alston RN  Outcome: Ongoing  6/2/2021 1611 by Polo Epley, RN  Outcome: Ongoing     Problem: Skin Integrity:  Goal: Will show no infection signs and symptoms  Description: Will show no infection signs and symptoms  6/3/2021 0120 by Janet Alston RN  Outcome: Ongoing  6/2/2021 1611 by Polo Epley, RN  Outcome: Ongoing  Goal: Absence of new skin breakdown  Description: Absence of new skin breakdown  6/3/2021 0120 by Janet Alston RN  Outcome: Ongoing  6/2/2021 1611 by Polo Epley, RN  Outcome: Ongoing normal...

## 2021-06-03 NOTE — PROGRESS NOTES
[facility]    Office: [phone]    Brian Paz, DO, Henry Pardo, DO, Sriram Vaughan, DO, Bethany Campbell, DO, Morales Saavedra MD, Malik Garcia MD, Tono Campos MD, Nicole Lugo MD, Noah Blount MD, Pelon Jimenez MD, Clemente Littlejohn MD, Lorin Hannon MD, Román Bruner MD, Zeeshan Sal DO, Ryne Deleon MD, Vineet Salas MD, Aniya Middleton DO, Jalil Acevedo MD,  Jordan Stern, DO, Cabrera Quigley MD, Zafar Gandhi MD, Андрей Gordillo, [facility], [facility], CNP, Analy Baldwin, CNP, Cassandra Lopes, CNS, Case Grubbs, CNP, Vannie Epley, CNP, Eliane Ch, CNP, Socorro Gaming, CNP, Roxana Cruz, CNP, Maria Isabel Marie PA-C, Timur Denise, MIGDALIA, Sebastien Turcios, CNP, Kat Hauser, CNP, Stacey Jean, CNP, Delta Bone, CNP, Hero Sarita, [address]    Progress Note    6/3/2021    10:09 AM    Name:   Deniz Ortiz  MRN:     [id number]     Acct:      [de-identified]   Room:   [address] [place] [address] Day:  7  Admit Date:  5/27/2021  2:18 AM    PCP:   No primary care provider on file. Code Status:  Full Code    Subjective:     C/C:   Back pain    Interval History Status:  Back Pain unchanged  Pain still rated 7/10  NPO  On call for OR today    Data Base Updates:  Piiloto42du/dL     PRV89Lkyg mg/dL CREATININE1. 35High mg/dL Bun/Cre RatioNOT REPORTED     Calcium9.1mg/dL   Cosieb194tody/L   Potassium3.5Low       Brief History:     As documented in the medical record: \"This is a 78-year-old female who initially presented to the hospital with complaints of increasing back pain. Initial MRI did show burst L2 fracture with 6 mm retropulsion of the spinal canal with moderate canal narrowing along with lucency in the right side. Patient was transferred for neurosurgery evaluation. MRI was concerning for pathologic fracture. IgA level 2250, kappa lambda ratio 0.01 and of work-up pending.  Tentative plan for OR this wednesday per Neurosurgery. The plan has included:  1. Pathologic fracture of lumbar Vertebra: Neurosurgery consulted, plan for OR early next week. Continue PT/OT. TLSO brace ordered. Hold ASA,  2. Workup for MM in progress. Kappa/Lambda 0.97/136. Immunoglobulin panel: IGA: 2250. Oncology consulted, need tissue diagnosis. 3. Protein calorie malnutrition: liberalize diet, High calorie supplement. 4. Nephrotic range proteinuria: increase norvasc 10 mg . Add ACE/ARB when AMERICO resolves  5. Monitor Hgb, transfuse prn for symptomatic anemia or Hgb <7  6. Continue Lipitor 20 mg daily. 7. Continue DVT ppx.  8. Continue Synthroid 50 mcg daily. 9. Risk stratification: patient is low/intermediate risk for surgery. \"     Immunofixation urine random profile [[id number]] Collected: 05/29/21 0520 Updated: 06/01/21 0955 Urine IFX Specimen. URINE VolumeNOT REPORTEDmL Urine Total Ygiuhgt115mg/dL Urine IFX InterpMONOCLONAL IMMUNOGLOBULIN IS PRESENT AND IDENTIFIED AS   Comment: IGA LAMBDA (1.4 MG/DL). FREE MONOCLONAL LIGHT CHAINS ARE PRESENT, IDENTIFIED AS   LAMBDA (142.2 MG/DL). Results for Ana العلي (MRN [id number]) as of 6/1/2021 17:01   Ref. Range 5/27/2021 09:44 5/27/2021 14:12 5/28/2021 05:41 5/28/2021 07:36 5/28/2021 21:12 5/29/2021 06:47 5/30/2021 11:24 5/31/2021 06:21 6/1/2021 06:14   Creatinine Latest Ref Range: 0.50 - 0.90 mg/dL 1.53 (H)  1.61 (H)   1.18 (H) 1.33 (H) 1.27 (H) 1.28 (H)       EKG:  Sinus tachycardia   Cannot rule out Anterior infarct , age undetermined   Abnormal ECG   No previous ECGs available         On call for surgery 6/3    Medications:      Allergies:  No Known Allergies    Current Meds:   Scheduled Meds:    potassium chloride  20 mEq Intravenous Once    polyethylene glycol  17 g Oral Daily    amLODIPine  10 mg Oral Daily    docusate sodium  200 mg Oral BID    sennosides-docusate sodium  2 tablet Oral Daily    sodium chloride flush  5-40 mL Intravenous 2 times per day    heparin (porcine)  5,000 Units Subcutaneous 3 times per day    latanoprost  1 drop Both Eyes Nightly    levothyroxine  50 mcg Oral Daily    atorvastatin  20 mg Oral Daily     Continuous Infusions:    sodium chloride 50 mL/hr at 06/02/21 2300    sodium chloride       PRN Meds: magnesium hydroxide, bisacodyl, metoprolol, oxyCODONE-acetaminophen **OR** oxyCODONE-acetaminophen, morphine, traMADol, sodium chloride flush, sodium chloride, nicotine, acetaminophen **OR** acetaminophen, sodium chloride flush    Data:     Past Medical History:   has a past medical history of Acute renal failure (ARF) ([facility] [address] Compression fracture of lumbar vertebra ([facility] [address] Essential hypertension, Other hyperlipidemia, and Other specified hypothyroidism. Social History:   reports that she has never smoked. She has never used smokeless tobacco. She reports previous alcohol use. She reports that she does not use drugs. Family History:   Family History   Problem Relation Age of Onset    No Known Problems Mother     No Known Problems Father        Review of Systems:     Review of Systems   Constitutional: Positive for activity change (Decreased). Respiratory: Negative for cough and shortness of breath. Cardiovascular: Negative for chest pain and palpitations. Gastrointestinal: Positive for constipation. Negative for abdominal pain, nausea and vomiting. Genitourinary: Negative for flank pain and hematuria. Musculoskeletal: Positive for back pain (Low back pain rated 7/10) and gait problem. Physical Examination:        Physical Exam  Vitals reviewed. Constitutional:       General: She is not in acute distress. Appearance: She is not diaphoretic. HENT:      Head: Normocephalic. Nose: Nose normal.   Eyes:      General: No scleral icterus. Conjunctiva/sclera: Conjunctivae normal.   Neck:      Trachea: No tracheal deviation. Cardiovascular:      Rate and Rhythm: Normal rate and regular rhythm.    Pulmonary: Effort: Pulmonary effort is normal. No respiratory distress. Breath sounds: Normal breath sounds. No wheezing or rales. Chest:      Chest wall: No tenderness. Abdominal:      General: Bowel sounds are normal. There is no distension. Palpations: Abdomen is soft. Tenderness: There is no abdominal tenderness. Musculoskeletal:         General: No tenderness. Cervical back: Neck supple. Skin:     General: Skin is warm and dry. Neurological:      General: No focal deficit present. Mental Status: She is alert and oriented to person, place, and time. Vitals:  BP (!) 146/73   Pulse 83   Temp 97.9 °F (36.6 °C) (Oral)   Resp 14   Ht 5' (1.524 m)   Wt 92 lb 9.5 oz (42 kg)   SpO2 94%   BMI 18.08 kg/m²   Temp (24hrs), Av.4 °F (36.9 °C), Min:97.9 °F (36.6 °C), Max:98.9 °F (37.2 °C)    No results for input(s): POCGLU in the last 72 hours. I/O (24Hr): Intake/Output Summary (Last 24 hours) at 6/3/2021 1009  Last data filed at 6/3/2021 0731  Gross per 24 hour   Intake    Output 1550 ml   Net -1550 ml       Labs:  Hematology:  Recent Labs     21  0614   WBC 8.3   RBC 3.07*   HGB 8.7*   HCT 28.3*   MCV 92.2   MCH 28.3   MCHC 30.7   RDW 13.5      MPV 10.2     Chemistry:  Recent Labs     21  0614 21  0614 21  0556    136 143   K 3.8 4.1 3.5*    101 102   CO2 26 27 27   GLUCOSE 93 86 90   BUN 21 28* 29*   CREATININE 1.28* 1.46* 1.35*   ANIONGAP 12 8* 14   LABGLOM 41* 35* 38*   GFRAA 49* 42* 46*   CALCIUM 9.0 8.9 9.1   No results for input(s): PROT, LABALBU, LABA1C, G7UVJKZ, Q6ZKKUC, FT4, TSH, AST, ALT, LDH, GGT, ALKPHOS, LABGGT, BILITOT, BILIDIR, AMMONIA, AMYLASE, LIPASE, LACTATE, CHOL, HDL, LDLCHOLESTEROL, CHOLHDLRATIO, TRIG, VLDL, OUE46HY, PHENYTOIN, PHENYF, URICACID, POCGLU in the last 72 hours.   ABG:No results found for: POCPH, PHART, PH, POCPCO2, DMK6QAK, PCO2, POCPO2, PO2ART, PO2, POCHCO3, YNW3DHR, HCO3, NBEA, PBEA, BEART, BE, THGBART, THB, ZBH1GNH, NINJ6FPN, W7KOFXOO, O2SAT, FIO2  No results found for: SPECIAL  No results found for: CULTURE    Radiology:  XR BONE SURVEY COMPLETE    Result Date: 5/28/2021  No definite focal lytic lesions identified on radiographic bone survey. Severe compression fracture at L2 is redemonstrated (previously seen on MRI dated 05/25/2021). Ribs appear mildly heterogeneous. CT LUMBAR SPINE WO CONTRAST    Result Date: 5/30/2021  Redemonstration of a pathologic compression fracture of L2 with extraosseous extension resulting in canal stenosis as well as bilateral L2-3 foraminal narrowing. This is better evaluated on the recent MRI examination. Multilevel degenerative change with degenerative bilateral foraminal narrowing at L5-S1. Small bibasilar effusions with adjacent infiltrates. MRI CERVICAL SPINE W WO CONTRAST    Result Date: 5/27/2021  No evidence of metastatic disease inside the spinal canal. C2 and C3 vertebral body demonstrate fat marrow signal and remainder of the cervical vertebral bodies have diffuse T1 and T2 hypointensity. C2 and C3 may have been exposed to prior radiation. The appearance of the remainder of vertebral bodies may be related to anemia or other diffuse marrow abnormalities. Anterior aspect of C4 has enhancing lesion. Finding may be related to atypical hemangioma or marrow infiltration. At C5-C6, moderate canal stenosis and severe bilateral neural foraminal narrowing. Left foraminal disc protrusion/osteophytic ridging impinges on the exiting left C6 nerve root. Other mild to moderate degenerative changes of cervical spine as described. MRI THORACIC SPINE W WO CONTRAST    Addendum Date: 5/27/2021    ADDENDUM: Critical results were called by Dr. Franco Villa MD to [place] [facility] on 5/27/2021 at 19:26. The reported infiltrative mass lesion within the upper lumbar spine may also be related to hematoma .      Result Date: 5/27/2021  Discrete enhancing lesions involving the spinous processes of the vertebral bodies of T4, T6 and T7 and anterior aspect of vertebral body of T4 concerning for metastatic disease. Diffuse T1 and T2 hypointensity of the marrow which may be effect of anemia or chemotherapy. Only partially included on the acquired images there is a diffuse infiltrative mass lesion surrounding the anterior and lateral aspect of L1 vertebral body. Clinical correlation and if appropriate contrast enhanced CT of abdomen and pelvis is recommended. The findings were sent to the Radiology Results [address] at 7:10 pm on 5/27/2021to be communicated to a licensed caregiver. US RENAL COMPLETE    Result Date: 5/27/2021  Diffuse increased renal parenchymal echogenicity bilaterally consistent with medical renal disease. No hydronephrosis in either kidney. 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  *      Plan:        Neurosurgical evaluation in progress  Surgery planned today  EKG reveals poor R wave progression  Awaiting echo  Pain management  Blood Pressure - Monitor and control   Lopressor as needed  Hematology evaluation and follow-up as scheduled  Nephrology evaluation follow-up as scheduled  Check bun and creatinine   Avoid nephrotoxins  Nutritional supplementation  Laxatives as needed  DVT prophylaxis    IP CONSULT TO NEUROSURGERY  IP CONSULT TO HEM/ONC  IP CONSULT TO DIETITIAN  INPATIENT CONSULT TO ORTHOTIST/PROSTHETIST  IP CONSULT TO NEPHROLOGY  IP CONSULT TO IV TEAM    Gabe Brooks,   6/3/2021  10:09 AM

## 2021-06-04 ENCOUNTER — APPOINTMENT (OUTPATIENT)
Dept: CT IMAGING | Age: 76
DRG: 456 | End: 2021-06-04
Attending: INTERNAL MEDICINE
Payer: MEDICARE

## 2021-06-04 PROBLEM — I51.9 LEFT VENTRICULAR DIASTOLIC DYSFUNCTION: Status: ACTIVE | Noted: 2021-06-04

## 2021-06-04 LAB
ANION GAP SERPL CALCULATED.3IONS-SCNC: 15 MMOL/L (ref 9–17)
BUN BLDV-MCNC: 31 MG/DL (ref 8–23)
BUN/CREAT BLD: ABNORMAL (ref 9–20)
CALCIUM SERPL-MCNC: 8.2 MG/DL (ref 8.6–10.4)
CHLORIDE BLD-SCNC: 103 MMOL/L (ref 98–107)
CO2: 20 MMOL/L (ref 20–31)
CREAT SERPL-MCNC: 1.68 MG/DL (ref 0.5–0.9)
CULTURE: NO GROWTH
GFR AFRICAN AMERICAN: 36 ML/MIN
GFR NON-AFRICAN AMERICAN: 30 ML/MIN
GFR SERPL CREATININE-BSD FRML MDRD: ABNORMAL ML/MIN/{1.73_M2}
GFR SERPL CREATININE-BSD FRML MDRD: ABNORMAL ML/MIN/{1.73_M2}
GLUCOSE BLD-MCNC: 147 MG/DL (ref 70–99)
Lab: NORMAL
POTASSIUM SERPL-SCNC: 4.1 MMOL/L (ref 3.7–5.3)
SODIUM BLD-SCNC: 138 MMOL/L (ref 135–144)
SPECIMEN DESCRIPTION: NORMAL

## 2021-06-04 PROCEDURE — 97530 THERAPEUTIC ACTIVITIES: CPT

## 2021-06-04 PROCEDURE — 80053 COMPREHEN METABOLIC PANEL: CPT

## 2021-06-04 PROCEDURE — 99232 SBSQ HOSP IP/OBS MODERATE 35: CPT | Performed by: INTERNAL MEDICINE

## 2021-06-04 PROCEDURE — 6370000000 HC RX 637 (ALT 250 FOR IP): Performed by: STUDENT IN AN ORGANIZED HEALTH CARE EDUCATION/TRAINING PROGRAM

## 2021-06-04 PROCEDURE — 80048 BASIC METABOLIC PNL TOTAL CA: CPT

## 2021-06-04 PROCEDURE — 97166 OT EVAL MOD COMPLEX 45 MIN: CPT

## 2021-06-04 PROCEDURE — 6360000002 HC RX W HCPCS: Performed by: STUDENT IN AN ORGANIZED HEALTH CARE EDUCATION/TRAINING PROGRAM

## 2021-06-04 PROCEDURE — 2060000000 HC ICU INTERMEDIATE R&B

## 2021-06-04 PROCEDURE — 72131 CT LUMBAR SPINE W/O DYE: CPT

## 2021-06-04 PROCEDURE — 2580000003 HC RX 258: Performed by: STUDENT IN AN ORGANIZED HEALTH CARE EDUCATION/TRAINING PROGRAM

## 2021-06-04 PROCEDURE — 36415 COLL VENOUS BLD VENIPUNCTURE: CPT

## 2021-06-04 PROCEDURE — APPSS30 APP SPLIT SHARED TIME 16-30 MINUTES: Performed by: REGISTERED NURSE

## 2021-06-04 PROCEDURE — 84100 ASSAY OF PHOSPHORUS: CPT

## 2021-06-04 PROCEDURE — 97162 PT EVAL MOD COMPLEX 30 MIN: CPT

## 2021-06-04 PROCEDURE — 97535 SELF CARE MNGMENT TRAINING: CPT

## 2021-06-04 RX ORDER — ONDANSETRON 2 MG/ML
4 INJECTION INTRAMUSCULAR; INTRAVENOUS EVERY 6 HOURS PRN
Status: DISCONTINUED | OUTPATIENT
Start: 2021-06-04 | End: 2021-06-10 | Stop reason: HOSPADM

## 2021-06-04 RX ADMIN — AMLODIPINE BESYLATE 10 MG: 10 TABLET ORAL at 08:57

## 2021-06-04 RX ADMIN — DOCUSATE SODIUM 200 MG: 100 CAPSULE ORAL at 08:57

## 2021-06-04 RX ADMIN — OXYCODONE HYDROCHLORIDE AND ACETAMINOPHEN 1 TABLET: 5; 325 TABLET ORAL at 00:17

## 2021-06-04 RX ADMIN — METHOCARBAMOL TABLETS 500 MG: 500 TABLET, COATED ORAL at 20:45

## 2021-06-04 RX ADMIN — METHOCARBAMOL TABLETS 500 MG: 500 TABLET, COATED ORAL at 13:28

## 2021-06-04 RX ADMIN — ATORVASTATIN CALCIUM 20 MG: 20 TABLET, FILM COATED ORAL at 20:45

## 2021-06-04 RX ADMIN — DEXTROSE MONOHYDRATE 2000 MG: 50 INJECTION, SOLUTION INTRAVENOUS at 13:28

## 2021-06-04 RX ADMIN — OXYCODONE HYDROCHLORIDE AND ACETAMINOPHEN 1 TABLET: 5; 325 TABLET ORAL at 05:32

## 2021-06-04 RX ADMIN — SODIUM CHLORIDE, PRESERVATIVE FREE 10 ML: 5 INJECTION INTRAVENOUS at 20:47

## 2021-06-04 RX ADMIN — DEXTROSE MONOHYDRATE 2000 MG: 50 INJECTION, SOLUTION INTRAVENOUS at 06:10

## 2021-06-04 RX ADMIN — OXYCODONE HYDROCHLORIDE AND ACETAMINOPHEN 1 TABLET: 5; 325 TABLET ORAL at 10:02

## 2021-06-04 RX ADMIN — DOCUSATE SODIUM 200 MG: 100 CAPSULE ORAL at 20:45

## 2021-06-04 RX ADMIN — LATANOPROST 1 DROP: 50 SOLUTION OPHTHALMIC at 22:22

## 2021-06-04 RX ADMIN — LEVOTHYROXINE SODIUM 50 MCG: 50 TABLET ORAL at 08:57

## 2021-06-04 RX ADMIN — Medication 100 MCG/HR: at 12:32

## 2021-06-04 RX ADMIN — METHOCARBAMOL TABLETS 500 MG: 500 TABLET, COATED ORAL at 05:31

## 2021-06-04 ASSESSMENT — PAIN SCALES - GENERAL
PAINLEVEL_OUTOF10: 8
PAINLEVEL_OUTOF10: 10
PAINLEVEL_OUTOF10: 7
PAINLEVEL_OUTOF10: 8
PAINLEVEL_OUTOF10: 6
PAINLEVEL_OUTOF10: 9

## 2021-06-04 ASSESSMENT — ENCOUNTER SYMPTOMS
COUGH: 0
BACK PAIN: 1
CONSTIPATION: 1
ABDOMINAL PAIN: 0
NAUSEA: 0
SHORTNESS OF BREATH: 0
VOMITING: 0

## 2021-06-04 ASSESSMENT — PAIN DESCRIPTION - LOCATION
LOCATION: BACK
LOCATION: BACK

## 2021-06-04 ASSESSMENT — PAIN DESCRIPTION - PAIN TYPE
TYPE: SURGICAL PAIN
TYPE: SURGICAL PAIN

## 2021-06-04 ASSESSMENT — PAIN DESCRIPTION - FREQUENCY: FREQUENCY: CONTINUOUS

## 2021-06-04 ASSESSMENT — PAIN DESCRIPTION - DESCRIPTORS: DESCRIPTORS: ACHING;DISCOMFORT;SORE

## 2021-06-04 NOTE — PROGRESS NOTES
Comprehensive Nutrition Assessment    Type and Reason for Visit:  Reassess    Nutrition Recommendations/Plan:   - Continue Regular Diet as tolerated  - Resume high calorie oral nutrition supplement (Ensure Enlive) and provide 1x/d   - Monitor/encourage intakes    Nutrition Assessment:  Patient s/p L2-L3 Laminectomy, Facetectomy, and T12-L4 Fusion on 6/4. Continues with poor appetite. Consumed 26-50% of breakfast this morning, per EHR. Last BM 6/1 - bowel regimen in place. Weight has fluctuated from 91-99 lbs throughout length of stay. Note a usual body weight of 107 lbs, per patient. Recommend to resume ONS 1x/d due to poor appetite/intake. Malnutrition Assessment:  Malnutrition Status:  Severe malnutrition    Context:  Chronic Illness     Findings of the 6 clinical characteristics of malnutrition:  Energy Intake:  7 - 75% or less estimated energy requirements for 1 month or longer  Weight Loss:   13% x 7 months     Body Fat Loss:  7 - Severe body fat loss- Orbital   Muscle Mass Loss:  7 - Severe muscle mass loss- Temples (temporalis), Clavicles (pectoralis & deltoids), Hand (interosseous)  Fluid Accumulation:  No significant fluid accumulation    Strength:  Not Performed    Estimated Daily Nutrient Needs:  Energy (kcal):  6078-7771 kcals/day; Weight Used for Energy Requirements:  Current (44.5 kg)     Protein (g):  55-65 g protein/d (1.3-1.5 g/kg); Weight Used for Protein Requirements:  Current (44.5 kg)        Fluid (ml/day):  1.0-1.3 L (or per MD); Method Used for Fluid Requirements:  ml/Kg      Nutrition Related Findings:  Labs. Meds reviewed. Last BM 6/1. Wounds:  Surgical Incision       Current Nutrition Therapies:    ADULT DIET;  Regular    Anthropometric Measures:  · Height: 5' (152.4 cm)  · Current Body Weight: 98 lb (44.5 kg)   · Admission Body Weight: 93 lb 11.1 oz (42.5 kg)    · Usual Body Weight: 107 lb (48.5 kg) (per patient)     · Ideal Body Weight: 100 lbs; % Ideal Body Weight 98 %   ·

## 2021-06-04 NOTE — ANESTHESIA POSTPROCEDURE EVALUATION
Department of Anesthesiology  Postprocedure Note    Patient: Edwin Carvajal  MRN: [id number]  YOB: 1945  Date of evaluation: 6/3/2021  Time:  10:27 PM     Procedure Summary     Date: 06/03/21 Room / Location: [address]    Anesthesia Start: 2958 Anesthesia Stop: 2024    Procedure: L2 LAMINECTOMY, MEDIAL FASETECTOMY, LEFT L1 INFERIOR MEDIAL FASETECTOMY, LEFT L2 FORAMINOTOMY, PARITAL L3 LAMINECTOMY, FUSION T12-L4 FUSION (N/A ) Diagnosis: (PATHOLOGIC FRACTURE L2)    Surgeons: Kusum Collins DO Responsible Provider: Ronald Wilson MD    Anesthesia Type: general ASA Status: 3          Anesthesia Type: general    Rc Phase I: Rc Score: 8    Rc Phase II:      Last vitals: Reviewed and per EMR flowsheets.        Anesthesia Post Evaluation    Patient location during evaluation: PACU  Patient participation: complete - patient participated  Level of consciousness: awake and alert  Pain score: 4  Airway patency: patent  Nausea & Vomiting: no nausea and no vomiting  Complications: no  Cardiovascular status: hemodynamically stable  Respiratory status: room air and nasal cannula  Hydration status: euvolemic

## 2021-06-04 NOTE — PLAN OF CARE
Problem: Pain:  Goal: Control of acute pain  Description: Control of acute pain  Outcome: Ongoing  Note: Pain level assessment complete. Pt rated pain at 7/10. Pt educated on pain scale and control interventions. PRN pain medication given per pt request. Pt instructed to call out with new onset of pain or unrelieved pain. Will continue to monitor.

## 2021-06-04 NOTE — PROGRESS NOTES
[facility]    Office: [address], DO, Kwame Petty, DO, Hermes Allen, DO, Augustus Campbell, DO, Zaheer Long MD, Jose Herrmann MD, Zaheer Bonds MD, Courtney Cardoza MD, Carrie Reynaga MD, Slim Vaughn MD, Lamine Martin MD, Isai Couch MD, Román Lay MD, Guanako Flores DO, Jose Clarke MD, Tom Rivas MD, Regina Woods DO, Eden Mckeon MD,  Ravi Aragon DO, Octaviano Knott MD, Derald Jeans, MD, Birdie Mabry [facility], [address], [facility], Jim Garcia, CNP, Lemuel Hill, CNS, Brandy Mendoza, CNP, Christian Jose, CNP, An Phan, CNP, Radha Hunt, CNP, Mario Dorman, CNP, Dashawn Arce PA-C, Chandra Berumen [facility], Nathalie Womack, CNP, Renata Handley, CNP, Aaron Magallon, CNP, Antoine Holter, [facility], [place] , [address]    Progress Note    6/4/2021    10:59 AM    Name:   Kalina Chaudhary  MRN:     [id number]     Acct:      [de-identified]   Room:   [facility]2740-20   Day:  8  Admit Date:  5/27/2021  2:18 AM    PCP:   No primary care provider on file. Code Status:  Full Code    Subjective:     C/C:   Back pain    Interval History Status:  POD 1  Comfortable  Pain rated 4/10  Doing okay with diet    Data Base Updates:  Fgoxcfe053Sprg mg/dL     MJX76Uyrd mg/dL   CREATININE1. 68High mg/dL   Bun/Cre RatioNOT REPORTED     Calcium8. 2Low   Calcium, Ion1.25       Brief History:     As documented in the medical record: \"This is a 71-year-old female who initially presented to the hospital with complaints of increasing back pain. Initial MRI did show burst L2 fracture with 6 mm retropulsion of the spinal canal with moderate canal narrowing along with lucency in the right side. Patient was transferred for neurosurgery evaluation. MRI was concerning for pathologic fracture. IgA level 2250, kappa lambda ratio 0.01 and of work-up pending. Tentative plan for OR this wednesday per Neurosurgery.      The plan MEDIAL FACETECTOMY, LEFT L1 MEDIAL FACETECTOMY, LEFT L2 FORAMINOTOMY, PARITAL L3 LAMINECTOMY, T12-L4 INSTRUMENTED FUSION  BIOPSY OF T2 TUMOR  Surgeon(s):  Kala Amaya DO      Medications: Allergies:  No Known Allergies    Current Meds:   Scheduled Meds:    methocarbamol  500 mg Oral Q8H    ceFAZolin  2,000 mg Intravenous Q8H    polyethylene glycol  17 g Oral Daily    amLODIPine  10 mg Oral Daily    docusate sodium  200 mg Oral BID    sennosides-docusate sodium  2 tablet Oral Daily    sodium chloride flush  5-40 mL Intravenous 2 times per day    latanoprost  1 drop Both Eyes Nightly    levothyroxine  50 mcg Oral Daily    atorvastatin  20 mg Oral Daily     Continuous Infusions:    [Held by provider] HYDROmorphone      sodium chloride 50 mL/hr at 06/03/21 2239    sodium chloride       PRN Meds: HYDROmorphone, ondansetron, naloxone, oxyCODONE-acetaminophen, magnesium hydroxide, bisacodyl, metoprolol, sodium chloride flush, sodium chloride, nicotine, acetaminophen **OR** acetaminophen, sodium chloride flush    Data:     Past Medical History:   has a past medical history of Acute renal failure (ARF) ([facility] [address] Compression fracture of lumbar vertebra ([facility] [address] Essential hypertension, Other hyperlipidemia, and Other specified hypothyroidism. Social History:   reports that she has never smoked. She has never used smokeless tobacco. She reports previous alcohol use. She reports that she does not use drugs. Family History:   Family History   Problem Relation Age of Onset    No Known Problems Mother     No Known Problems Father        Review of Systems:     Review of Systems   Constitutional: Positive for activity change (Decreased). Respiratory: Negative for cough and shortness of breath. Cardiovascular: Negative for chest pain and palpitations. Gastrointestinal: Positive for constipation. Negative for abdominal pain, nausea and vomiting. Genitourinary: Negative for flank pain and hematuria. Musculoskeletal: Positive for back pain (Incisional pain rated 4/10) and gait problem. Physical Examination:        Physical Exam  Vitals reviewed. Constitutional:       General: She is not in acute distress. Appearance: She is not diaphoretic. HENT:      Head: Normocephalic. Nose: Nose normal.   Eyes:      General: No scleral icterus. Conjunctiva/sclera: Conjunctivae normal.   Neck:      Trachea: No tracheal deviation. Cardiovascular:      Rate and Rhythm: Normal rate and regular rhythm. Pulmonary:      Effort: Pulmonary effort is normal. No respiratory distress. Breath sounds: Normal breath sounds. No wheezing or rales. Chest:      Chest wall: No tenderness. Abdominal:      General: Bowel sounds are normal. There is no distension. Palpations: Abdomen is soft. Tenderness: There is no abdominal tenderness. Musculoskeletal:         General: No tenderness. Cervical back: Neck supple. Skin:     General: Skin is warm and dry. Neurological:      General: No focal deficit present. Mental Status: She is alert and oriented to person, place, and time. Vitals:  BP (!) 120/55   Pulse 110   Temp 98.2 °F (36.8 °C) (Oral)   Resp 28   Ht 5' (1.524 m)   Wt 98 lb (44.5 kg)   SpO2 97%   BMI 19.14 kg/m²   Temp (24hrs), Av.5 °F (36.4 °C), Min:87.3 °F (30.7 °C), Max:99.9 °F (37.7 °C)    Recent Labs     21  1610   POCGLU 91       I/O (24Hr):     Intake/Output Summary (Last 24 hours) at 2021 1059  Last data filed at 2021 0945  Gross per 24 hour   Intake 2054 ml   Output 1680 ml   Net 374 ml       Labs:  Hematology:  Recent Labs     21  1610   HGB 8.7   HCT 27.2     Chemistry:  Recent Labs     21  0614 21  0556 21  1610 21  0550    143 139 138   K 4.1 3.5* 3.3* 4.1    102  --  103   CO2 27 27  --  20   GLUCOSE 86 90  --  147*   BUN 28* 29*  --  31*   CREATININE 1.46* 1.35*  --  1.68*   ANIONGAP 8* 14  -- 15   LABGLOM 35* 38*  --  30*   GFRAA 42* 46*  --  36*   CALCIUM 8.9 9.1  --  8.2*   CAION  --   --  1.25  --      Recent Labs     06/03/21  1610   POCGLU 91     ABG:  Lab Results   Component Value Date    PHART 7.374 06/03/2021    DPQ9XRF 38.5 06/03/2021    PO2ART 244.0 06/03/2021    WDJ8TEE 21.9 06/03/2021    NBEA 2.4 06/03/2021    PBEA NOT REPORTED 06/03/2021    S1EIDUAU 99.6 06/03/2021    FIO2 57% 06/03/2021     No results found for: SPECIAL  No results found for: CULTURE    Radiology:  XR BONE SURVEY COMPLETE    Result Date: 5/28/2021  No definite focal lytic lesions identified on radiographic bone survey. Severe compression fracture at L2 is redemonstrated (previously seen on MRI dated 05/25/2021). Ribs appear mildly heterogeneous. CT LUMBAR SPINE WO CONTRAST    Result Date: 5/30/2021  Redemonstration of a pathologic compression fracture of L2 with extraosseous extension resulting in canal stenosis as well as bilateral L2-3 foraminal narrowing. This is better evaluated on the recent MRI examination. Multilevel degenerative change with degenerative bilateral foraminal narrowing at L5-S1. Small bibasilar effusions with adjacent infiltrates. MRI CERVICAL SPINE W WO CONTRAST    Result Date: 5/27/2021  No evidence of metastatic disease inside the spinal canal. C2 and C3 vertebral body demonstrate fat marrow signal and remainder of the cervical vertebral bodies have diffuse T1 and T2 hypointensity. C2 and C3 may have been exposed to prior radiation. The appearance of the remainder of vertebral bodies may be related to anemia or other diffuse marrow abnormalities. Anterior aspect of C4 has enhancing lesion. Finding may be related to atypical hemangioma or marrow infiltration. At C5-C6, moderate canal stenosis and severe bilateral neural foraminal narrowing. Left foraminal disc protrusion/osteophytic ridging impinges on the exiting left C6 nerve root.  Other mild to moderate degenerative changes of needed  Hematology evaluation and follow-up as scheduled  Nephrology evaluation follow-up as scheduled  Check bun and creatinine   Avoid nephrotoxins  Nutritional supplementation  Laxatives as needed  DVT prophylaxis  PT/OT  EPCs  Discharge planning  Will discharge when arrangements complete and ok with other services. Follow-up with PCP in one week, No primary care provider on file.   Notify PCP of discharge     IP CONSULT TO NEUROSURGERY  IP CONSULT TO HEM/ONC  IP CONSULT TO DIETITIAN  INPATIENT CONSULT TO ORTHOTIST/PROSTHETIST  IP CONSULT TO NEPHROLOGY  IP CONSULT TO IV TEAM    Rani Lorenzana DO  6/4/2021  10:59 AM

## 2021-06-04 NOTE — PROGRESS NOTES
normal NO S3 and NO S4 . No rubs , no murmur. ABDOMEN: soft nontender, bowel sounds present, no organomegaly, no ascites.        EXTREMITIES: no cyanosis, clubbing or edema     CURRENT MEDICATIONS      HYDROmorphone (DILAUDID) injection 0.5 mg, Q4H PRN  ondansetron (ZOFRAN) injection 4 mg, Q6H PRN  naloxone (NARCAN) injection 0.4 mg, PRN  HYDROmorphone (DILAUDID) 200 mcg/mL PCA, Continuous  methocarbamol (ROBAXIN) tablet 500 mg, Q8H  oxyCODONE-acetaminophen (PERCOCET) 5-325 MG per tablet 1 tablet, Q4H PRN  0.9 % sodium chloride infusion, Continuous  magnesium hydroxide (MILK OF MAGNESIA) 400 MG/5ML suspension 30 mL, Daily PRN  bisacodyl (DULCOLAX) suppository 10 mg, Daily PRN  metoprolol (LOPRESSOR) injection 5 mg, Q6H PRN  polyethylene glycol (GLYCOLAX) packet 17 g, Daily  amLODIPine (NORVASC) tablet 10 mg, Daily  docusate sodium (COLACE) capsule 200 mg, BID  sennosides-docusate sodium (SENOKOT-S) 8.6-50 MG tablet 2 tablet, Daily  sodium chloride flush 0.9 % injection 5-40 mL, 2 times per day  sodium chloride flush 0.9 % injection 5-40 mL, PRN  0.9 % sodium chloride infusion, PRN  nicotine (NICODERM CQ) 21 MG/24HR 1 patch, Daily PRN  acetaminophen (TYLENOL) tablet 650 mg, Q6H PRN   Or  acetaminophen (TYLENOL) suppository 650 mg, Q6H PRN  latanoprost (XALATAN) 0.005 % ophthalmic solution 1 drop, Nightly  levothyroxine (SYNTHROID) tablet 50 mcg, Daily  atorvastatin (LIPITOR) tablet 20 mg, Daily  sodium chloride flush 0.9 % injection 10 mL, PRN          LABS      CBC:   Recent Labs     06/03/21  1610   HGB 8.7   HCT 27.2      BMP:   Recent Labs     06/02/21  0614 06/03/21  0556 06/03/21  1610 06/04/21  0550    143 139 138   K 4.1 3.5* 3.3* 4.1    102  --  103   CO2 27 27  --  20   BUN 28* 29*  --  31*   CREATININE 1.46* 1.35*  --  1.68*   GLUCOSE 86 90  --  147*   CALCIUM 8.9 9.1  --  8.2*      IRON:    Lab Results   Component Value Date    IRON 75 05/27/2021       SPEP:   Lab Results   Component Value Date    PROT 6.6 05/29/2021    ALBCAL 3.3 05/27/2021    ALBPCT 49 05/27/2021    LABALPH 0.2 05/27/2021    LABALPH 0.9 05/27/2021    A1PCT 3 05/27/2021    A2PCT 13 05/27/2021    LABBETA 2.1 05/27/2021    BETAPCT 31 05/27/2021    GAMGLOB 0.3 05/27/2021    GGPCT 4 05/27/2021    PATH ELECTRONICALLY SIGNED. Jessica Ayala M.D. 05/29/2021     UPEP:   Lab Results   Component Value Date     05/29/2021     05/29/2021      URINE PROTEIN:    Lab Results   Component Value Date     05/29/2021     05/29/2021     URINALYSIS:  U/A:   Lab Results   Component Value Date    NITRU NEGATIVE 05/29/2021    COLORU YELLOW 05/29/2021    PHUR 6.0 05/29/2021    WBCUA 5 TO 10 05/29/2021    RBCUA TOO NUMEROUS TO COUNT 05/29/2021    MUCUS NOT REPORTED 05/29/2021    TRICHOMONAS NOT REPORTED 05/29/2021    YEAST NOT REPORTED 05/29/2021    BACTERIA NOT REPORTED 05/29/2021    SPECGRAV 1.010 05/29/2021    LEUKOCYTESUR NEGATIVE 05/29/2021    UROBILINOGEN Normal 05/29/2021    BILIRUBINUR NEGATIVE 05/29/2021    GLUCOSEU NEGATIVE 05/29/2021    KETUA NEGATIVE 05/29/2021    AMORPHOUS NOT REPORTED 05/29/2021     Free light chain ratio: 0.1 , clearly very abnormal.       ASSESSMENT      1. Pt with ckd2-3a , baseline creat likely 1.1 or so. Had AMERICO secondary to poor PO and NSAID use / ACE use. 2. HTN: stable   3. Myeloma with nephrotic proteinuria . Recent diagnosis ? Amyloidosis  4. Hypothyroidism :  5. Pathologic L2 fracture. She is s/p L2-3 laminectomy , facetotomy and T12- L4 fusion yesterday    PLAN      1. Check a bladder scan. 2. Encourage oral fluids. 3. Follow up labs ordered. 4. Following along       Please do not hesitate to call with questions.     This note is created with the assistance of a speech-recognition program. While intending to generate a document that actually reflects the content of the visit, no guarantees can be provided that every mistake has been identified and corrected by editing Electronically signed by Alma Tam MD on 6/4/2021 at 2:34 PM

## 2021-06-04 NOTE — PROGRESS NOTES
Orthotics  Spinal Other: TLSO while OOB, okay to don while in sitting EOB  Position Activity Restriction  Other position/activity restrictions: posterior corpectomy with fusion lumbar spine 6/3  Vision/Hearing  Vision: Impaired  Vision Exceptions: Wears glasses at all times  Hearing: Within functional limits     Subjective  General  Patient assessed for rehabilitation services?: Yes  Response To Previous Treatment: Not applicable  Family / Caregiver Present: Yes ( and sister)  Follows Commands: Within Functional Limits  Subjective  Subjective: RN and pt agreeable to PT. Pt supine in bed upon arrival and exit. Pt cooperative and pleasant throughout. Pain Screening  Patient Currently in Pain: Yes  Pain Assessment  Pain Assessment: 0-10  Pain Level: 8  Pain Type: Surgical pain  Pain Location: Back  Pain Descriptors: Aching;Discomfort; Sore  Pain Frequency: Continuous  Non-Pharmaceutical Pain Intervention(s): Ambulation/Increased Activity  Response to Pain Intervention: Patient Satisfied  Vital Signs  Patient Currently in Pain: Yes  Pre Treatment Pain Screening  Intervention List: Patient able to continue with treatment    Orientation  Orientation  Overall Orientation Status: Within Functional Limits  Social/Functional History  Social/Functional History  Lives With: Spouse  Type of Home: House  Home Layout: One level  Home Access: Stairs to enter with rails  Entrance Stairs - Number of Steps: 4  Entrance Stairs - Rails: Left  Bathroom Shower/Tub: Walk-in shower, Shower chair without back  Bathroom Toilet: Standard (has a raised toilet seat in 2nd bathroom)  Bathroom Equipment: Grab bars in shower  Home Equipment: Rolling walker, 4 wheeled walker, [employer], Reacher  ADL Assistance: [address]: Independent  Homemaking Responsibilities: Yes  Meal Prep Responsibility: Primary  Laundry Responsibility: Primary  Cleaning Responsibility: Primary  Shopping Responsibility: Primary Ambulation Assistance: Independent  Transfer Assistance: Independent  Active : Yes  Mode of Transportation: SUV  Occupation: Retired  Type of occupation: [employer], 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  Additional Comments: Pt  and sister are able to help PRN  Cognition   Cognition  Overall Cognitive Status: WFL    Objective          Joint Mobility  Spine: within spinal precautions  ROM RLE: WFL  ROM LLE: WFL  ROM RUE: shoulder flexion ~90 due to pain, otherwise WFL  ROM LUE: shoulder flexion ~90 due to pain, otherwise WFL     Tone RLE  RLE Tone: Normotonic  Tone LLE  LLE Tone: Normotonic  Sensation  Overall Sensation Status: Impaired (Numbness in top L leg)  Bed mobility  Rolling to Left: Minimal assistance  Rolling to Right: Minimal assistance  Supine to Sit: Minimal assistance  Sit to Supine: Minimal assistance  Comment: verbal cues for log roll technique, assistance for LE and trunk progression; sat EOB ~10 minutes, heart rate increased to 130, donned brace with MOD A and heart heart decreased 111 before standing. Transfers  Sit to Stand: Stand by assistance  Stand to sit: Stand by assistance  Comment: completed 2 times with stable heart rate.  Pt denied headache throughout  Ambulation  Ambulation?: Yes  Ambulation 1  Surface: level tile  Device: Rolling Walker  Assistance: Contact guard assistance  Distance: marched in place 10x,  Comments: additional ambulation defered due to pt c/o dizziness  Stairs/Curb  Stairs?: No     Balance  Posture: Good  Sitting - Static: Fair;+  Sitting - Dynamic: Fair;+  Standing - Static: Fair  Standing - Dynamic: Fair  Comments: standing balance assessed with RW        Plan   Plan  Times per week: 5-6x/wk  Current Treatment Recommendations: Strengthening, ROM, Balance Training, Functional Mobility Training, Transfer Training, ADL/Self-care Training, Stair training, Gait Training, Endurance Training, Home Exercise Program, Safety Education & Training, Patient/Caregiver Education & Training, Positioning  Safety Devices  Type of devices: All fall risk precautions in place, Call light within reach, Gait belt, Patient at risk for falls, Left in bed, Nurse notified (pt left with family)  Restraints  Initially in place: No      AM-PAC Score  AM-PAC Inpatient Mobility Raw Score : 17 (06/04/21 1448)  AM-PAC Inpatient T-Scale Score : 42.13 (06/04/21 1448)  Mobility Inpatient CMS 0-100% Score: 50.57 (06/04/21 1448)  Mobility Inpatient CMS G-Code Modifier : CK (06/04/21 1448)          Goals  Short term goals  Time Frame for Short term goals: 14 visits  Short term goal 1: Ambulate 200ft with RW and supervision  Short term goal 2: Jair/Doff TLSO with Min A while sitting EOB  Short term goal 3: Ascend/Descend 4 stairs with left handrail and MIN A  Short term goal 4: Demo Good- dynamic standing balance  Short term goal 5: Demo log roll technique bed mobility independently       Therapy Time   Individual Concurrent Group Co-treatment   Time In 1358         Time Out 1428         Minutes 30         Timed Code Treatment Minutes: 8 Minutes       Lis Michelle    Evaluation/treatment performed by Student PT under the supervision of co-signing PT who agrees with all evaluation/treatment and documentation.

## 2021-06-04 NOTE — PLAN OF CARE
Nutrition Problem #1: Severe malnutrition  Intervention: Food and/or Nutrient Delivery: Continue Current Diet, Start Oral Nutrition Supplement  Nutritional Goals: Meet >50% of estimated nutrient needs

## 2021-06-04 NOTE — OP NOTE
Operative Note      Patient: Willie Borjas  YOB: 1945  MRN: [id number]    Date of Procedure: 6/3/2021    Pre-Op Diagnosis: PATHOLOGIC FRACTURE L2    Post-Op Diagnosis: Same       Procedure(s):  L2 LAMINECTOMY, MEDIAL FACETECTOMY  LEFT L1 MEDIAL FACETECTOMY, LEFT L2 FORAMINOTOMY  PARTIAL L3 LAMINECTOMY  ARTHODESIS T12-L1, L1-2, L2-3, L3-4  PEDICLE SCREW FIXATION T12, L1, L2, L3, L4  BIOPSY OF L2 TUMOR  USE OF MORALIZED ALLOGRAFT  O-ARM IMAGE GUIDANCE  FLUROSCOPY  SSEP/MEP/TRIGGERED EMG neuromonitering    Surgeon(s):  Tiffanie Lewis DO    Assistant:   First Assistant: [place] [employer]; Kirsten Quiñones RN    Anesthesia: General    Estimated Blood Loss (mL): 210    Complications: None    Specimens:   ID Type Source Tests Collected by Time Destination   1 : URINE FROM INITIAL GORE INSERTION Urine Urine, indwelling catheter CULTURE, URINE Tiffanie Lewis,  6/3/2021 1530    A : SPINE TUMOR Tissue Spine [address] , DO 6/3/2021 1812        Implants:  Implant Name Type Inv.  Item Serial No.  Lot No. LRB No. Used Action   SCREW SPNL MULTAXL 5.5X45 MM FOR 4.75 MM RICARDO ATS  SCREW SPNL MULTAXL 5.5X45 MM FOR 4.75 MM RICARDO ATS  [employer] [place] [employer]-  N/A 7 Implanted   SCREW SPNL MULTAXL 6.5X45 MM FOR 4.75 MM RICARDO ATS  SCREW SPNL MULTAXL 6.5X45 MM FOR 4.75 MM RICARDO ATS  [employer] [place] [employer]-WD  N/A 2 Implanted   GRAFT DURA [id number] CLLGN SUTURELESS HIGHLY CNFRM RESTR - [id number]  GRAFT DURA [id number] CLLGN SUTURELESS HIGHLY CNFRM RESTR [id number] [employer]- [id number] N/A 1 Implanted   SCREW SPNL MULTAXL 5.5X50 MM FOR 4.75 MM RICARDO ATS  SCREW SPNL MULTAXL 5.5X50 MM FOR 4.75 MM RICARDO ATS  [employer] [place] [employer]-WD  N/A 1 Implanted   GRAFT BNE VIABLE 5CC ELLA MTRX FIBERCEL - [id number]  GRAFT BNE VIABLE 5CC ELLA MTRX FIBERCEL [address] INC-WD [id number] N/A 1 Implanted   GRAFT BNE VIABLE 5CC ELLA MTRX FIBERCEL - [id number]  GRAFT BNE VIABLE 5CC ELLA MTRX FIBERCEL [address] [facility] [id number] N/A 1 Implanted   GRAFT BNE VIABLE 5CC ELLA MTRX FIBERCEL - [id number]  GRAFT BNE VIABLE 5CC ELLA MTRX FIBERCEL [address] INC-WD [id number] N/A 1 Implanted   GRAFT BNE SUB [id number] SPNL DEFORMITY MAGNIFUSE SC - [id number]  GRAFT BNE SUB [id number] SPNL DEFORMITY MAGNIFUSE SC [id number] [employer] SPINALGRAFT TECH[employer]  N/A 1 Implanted   GRAFT BNE SUB P3CT8IN POSTEROLATERAL CERV DEMIN BNE MTRX - [id number]  GRAFT BNE SUB [id number] POSTEROLATERAL CERV DEMIN BNE MTRX [id number] MED[employer] SPINALGRAFT TECH-WD  N/A 1 Implanted   GRAFT BNE SUB [id number] POSTEROLATERAL CERV DEMIN BNE MTRX - [id number]  GRAFT BNE SUB [id number] POSTEROLATERAL CERV DEMIN BNE MTRX [id number] [employer] SPINALCirtas SystemsAFT TECH[employer]  N/A 1 Implanted   RICARDO SPNL L140MM [id number] CO CHROME MOLYBDENUM + STR PERC  RICARDO SPNL L140MM [id number] CO CHROME MOLYBDENUM + STR PERC  [employer] [place] [employer]-WD  N/A 2 Implanted   SET SCR SPNL DIA4. 75MM TI BRK OFF [address] SOLERA  SET SCR SPNL DIA4. 75MM TI BRK OFF [address] [address] [address] SOLERA  MED[employer]IC SOFAMOR DANEK-WD  N/A 10 Implanted         Drains:   Closed/Suction Drain Inferior;Midline Back Accordion 10 [place] (Active)   Site Description Unable to view 06/04/21 0500   Dressing Status Clean;Dry; Intact 06/04/21 0500   Drainage Appearance Bloody;Bright red 06/04/21 0500   Status Compressed 06/04/21 0500   Output (ml) 180 ml 06/04/21 0500       [REMOVED] Urethral Catheter Double-lumen 16 fr (Removed)   Catheter Indications Need for fluid volume management of the critically ill patient in a critical care setting 06/04/21 0800   Site Assessment No urethral drainage 06/04/21 0400   Urine Color Valarie 06/04/21 0400   Urine Appearance Sediment 06/04/21 0400   Output (mL) 250 mL 06/04/21 0500       Findings: 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   She also has several weeks of numbness guidance verification of traditional pedicle screw starting points, the T12-L4 pedicle screws was placed first with a  hole using a navigated drill, followed by, navigated tap, followed by navigated screw. All screws sizes were 5.5 mm x 45 mm except the left L3 screw was a 5.5 mm x 50 mm, and bilateral L4 screws are 6.5 mm x 45 mm. Triggered EMG was then used to verify no medial or inferior breach of the pedicle and all screws. I then went on to the decompression. Using combination of Le[place]ell and high-speed drill, a L2 laminectomy was done. The medial facetectomy was also completed on the left side of the L2. Partial superior L3 laminectomy was also required and a inferior left L1 facetectomy was done as well. Ligamentum flavum was removed with Kerrisons. A L2 left foraminotomy was done to fully expose the exiting L2 nerve root which is a severe stenosis from the pathologic fracture and the ventral tumor. I then resected a 1 x 1 cm cube of ventral tumor for biopsy. The tumor is quite vascular. I have been decorticated the bony surfaces especially focusing on the facet joints. two 14 cm long by 4.75 mm wide call back from heriberto was bent in shape and placed onto the screw heads and fixed with set screws. Between the L1 and L3 levels, distraction was applied against the fixed L3 level 2 further decompress the severe vertebral height loss of L2 and the kyphosis. Setscrews were tightened, and final tightened. Final AP and lateral fluoroscopy shows excellent position of instrumentation. There was a incidental small rent in the dorsal dura with tiny arachnoid bubble without CSF leak. A piece of DuraGen matrix was placed on top of this, then DuraSeal was placed on top of that to secure it. The wound then thoroughly irrigated several times.   A combination of stem cells DBM and bone chips were placed in the lateral gutters at the level decompression and medially at the rostral and caudal on decompressed levels. This end the fusion part of the operation. Vancomycin and powder was onlaid onto the surgical field subfascially. A 10 [place] Hemovac drain was tunneled subfascially outside the skin. fascia was closed with 0 sutures and stratafix sutures. Another layer of vancomycin powder with placed on the suprafascial space. Wound was closed in usual fashion. Skin was dressed with Dermabond. Final motor potential potential and SSEPs were stable. All counts were correct. Patient was then turned supine off the OR table, extubated and recovered in PACU without complications.     Electronically signed by Evans Sheldon DO on 6/4/2021 at 6:17 PM

## 2021-06-04 NOTE — PROGRESS NOTES
Pt arrived to floor at 21:30. Pt was lethargic, end tidal CO2 was 28/29 and required much prompting to answer questions. 21:35 Dr. Vanessa Louise was messaged and informed of above and stated that he will come to evaluate pt.   22:45 Dr. Vanessa Louise up to floor to evaluate pt. Orders received to d/c Dilaudid PCA and only give Percocet.

## 2021-06-04 NOTE — PROGRESS NOTES
Occupational Therapy   Occupational Therapy Initial Assessment  Date: 2021   Patient Name: Zach Elizabeth  MRN: [id number]     : 1945    Date of Service: 2021  Obtained from Medical Chart:   The patient is a 77 y.o. female who presents from outlying facility for evaluation of L2 pathologic fracture. She has been complaining of worsening back pain over the last 6 months with altered sensation to both of her thighs. She denies weakness in her arms and legs. She admits to numbness in 5th digits of both hands She admits to intermittent neck pain as well. She admits to weight loss over the last several months due to decreased appetite and pain in her lower back with standing long periods of time to cook a meal. She denies loss of bowel and bladder function. Discharge Recommendations:  Patient would benefit from continued therapy after discharge  OT Equipment Recommendations  Equipment Needed: Yes  Mobility Devices: ADL Assistive Devices  ADL Assistive Devices: Sock-Aid Hard    Assessment   Performance deficits / Impairments: Decreased functional mobility ; Decreased balance;Decreased ADL status; Decreased endurance;Decreased high-level IADLs  Assessment: Pt supine in bed on arrvial. Education on log roll techs w/ good return, bed mobility completed at Min [facility]. EOB at [facility], donned TLSO brace w/ Min A to fasten. Pt educated on figure 4 tech w/ attempt and increased pain reported, Max A to complete. Pt completed functional transfers w/ RW at [facility] in [facility] completed w/ pt reporting dizziness and HR increase to 111, pt returned to sitting. Pt w/ Min A to doff TLSO brace. Pt retired supine in bed w/ all needs met. Pt will benefit from skilled OT to work on functional deficits in strengthening, balance, functional/mobility, endurance and address AE/DME education, EC/WS techs and adaptive ADL techs to increase participation in ADLs.    Prognosis: Good  Decision Making: Medium Complexity  Patient Education: OT role, OT POC, purpose of eval, log roll techs, Don/doff TLSO brace, RW use, hand placement during transfers - good return  REQUIRES OT FOLLOW UP: Yes  Activity Tolerance  Activity Tolerance: Patient Tolerated treatment well  Safety Devices  Safety Devices in place: Yes  Type of devices: Gait belt;Left in bed;Call light within reach; Bed alarm in place  Restraints  Initially in place: No           Patient Diagnosis(es): There were no encounter diagnoses. has a past medical history of Acute renal failure (ARF) (HCC), Compression fracture of lumbar vertebra ([facility] [address] Essential hypertension, Other hyperlipidemia, and Other specified hypothyroidism. has a past surgical history that includes Finger trigger release (Left); back surgery (06/03/2021); and lumbar fusion (N/A, 6/3/2021). Restrictions  Restrictions/Precautions  Required Braces or Orthoses?: Yes  Required Braces or Orthoses  Spinal: Thoracic Lumbar Sacral Orthotics  Spinal Other: when out of bed, okay to place while sitting edgo of bed,  Position Activity Restriction  Other position/activity restrictions: s/p L2 LAMINECTOMY, LEFT MEDIAL FACETECTOMY, LEFT L1 MEDIAL FACETECTOMY, LEFT L2 FORAMINOTOMY, PARITAL L3 LAMINECTOMY, T12-L4 INSTRUMENTED FUSION (6/3)    Subjective   General  Patient assessed for rehabilitation services?: Yes  Family / Caregiver Present: Yes ( and sister)  Diagnosis: Multiple myeloma not having achieved remission ([facility] [address]  General Comment  Comments: RN ok'd pt for OT/PT eval. Pt pleasant and cooperative throughout.   Patient Currently in Pain: Yes  Pain Assessment  Pain Assessment: 0-10  Pain Level: 8  Pain Type: Surgical pain  Pain Location: Back  Non-Pharmaceutical Pain Intervention(s): Ambulation/Increased Activity, therapeutic presence   Response to Pain Intervention: Patient Satisfied    Social/Functional History  Social/Functional History  Lives With: Spouse  Type of Home: House  Home Layout: One level  Home Access: Stairs to enter with rails  Entrance Stairs - Number of Steps: 4  Entrance Stairs - Rails: Left  Bathroom Shower/Tub: Walk-in shower, Shower chair without back  [employer]: Standard (has a raised toilet seat in 2nd bathroom)  Bathroom Equipment: Grab bars in shower  Home Equipment: Rolling walker, 4 wheeled walker, Wheelchair-manual, Reacher  ADL Assistance: [address]: Independent  Homemaking Responsibilities: Yes  Meal Prep Responsibility: Primary  Laundry Responsibility: Primary  Cleaning Responsibility: Primary  Shopping Responsibility: Primary  Ambulation Assistance: Independent  Transfer Assistance: Independent  Active : Yes  Mode of Transportation: [place]  Occupation: Retired  Type of occupation: [email or web address], 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  Additional Comments: Pt  and sister are able to help PRN       Objective   Vision: Impaired  Vision Exceptions: Wears glasses at all times  Hearing: Within functional limits          Balance  Sitting Balance: Stand by assistance (EOB ~20min)  Standing Balance: Contact guard assistance  Standing Balance  Time: ~2-3 min  Activity: EOB w/ RW  Comment: Marches in place w/ some dizziness reported and increase in , pt returned to seated position  Functional Mobility  Functional Mobility Comments: Not completed at this time d/t increased dizziness in standing  ADL  Feeding: Independent;Setup  Grooming: Independent;Setup  UE Bathing: Stand by assistance;Setup; Increased time to complete  LE Bathing: Setup; Increased time to complete; Moderate assistance  UE Dressing: Minimal assistance;Setup; Increased time to complete (Pt donned/doffed TLSO brace with Min A to buckle and tighten)  LE Dressing: Setup; Increased time to complete; Moderate assistance (Pt educated on figure 4 tech, attempt made w/ increased pain, required Max A to don socks)  Toileting: Setup; Increased time to complete; Moderate assistance  Oscar RUE RUE Tone: Normotonic  Tone LUE  LUE Tone: Normotonic  Coordination  Movements Are Fluid And Coordinated: Yes     Bed mobility  Supine to Sit: Minimal assistance (Min A for trunk and VC on log roll techs)  Sit to Supine: Minimal assistance (Min A for LE and VC on log roll techs)  Transfers  Sit to stand: Contact guard assistance  Stand to sit: Contact guard assistance  Transfer Comments: w/ RW     Cognition  Overall Cognitive Status: WFL        Sensation  Overall Sensation Status: Impaired (Numbness in top L leg)        LUE AROM (degrees)  LUE AROM : Exceptions  LUE General AROM: Shld flex limited ~90degs d/t increased pain. All other joints WFL  Left Hand AROM (degrees)  Left Hand AROM: WFL  RUE AROM (degrees)  RUE AROM : Exceptions  RUE General AROM: Shld flex limited ~90degs d/t increased pain.  All other joints WFL  Right Hand AROM (degrees)  Right Hand AROM: WFL  LUE Strength  Gross LUE Strength: WFL  L Hand General: 4+/5  LUE Strength Comment: Not formally assessed d/t increased pain, WFL observed w/ RW use during functional transfers  RUE Strength  Gross RUE Strength: WFL  R Hand General: 4+/5  RUE Strength Comment: Not formally assessed d/t increased pain, WFL observed w/ RW use during functional transfers        Plan   Plan  Times per week: 3-4 x/wk  Current Treatment Recommendations: Strengthening, Functional Mobility Training, Patient/Caregiver Education & Training, Endurance Training, Equipment Evaluation, Education, & procurement, Balance Training, Safety Education & Training, Self-Care / ADL      AM-Merged with [facility] Score  AM-Merged with [facility] Inpatient Daily Activity Raw Score: 17 (06/04/21 1457)  AM-PAC Inpatient ADL T-Scale Score : 37.26 (06/04/21 1457)  ADL Inpatient CMS 0-100% Score: 50.11 (06/04/21 1457)  ADL Inpatient CMS G-Code Modifier : CK (06/04/21 1457)    Goals  Short term goals  Time Frame for Short term goals: Pt will, by discharge  Short term goal 1: [facility]  Short term goal 2: Demo LB ADLs at [facility]

## 2021-06-04 NOTE — PROGRESS NOTES
Physical Therapy    DATE: 2021    NAME: Edwin Carvajal  MRN: [id number]   : 1945      Patient not seen this date for Physical Therapy due to:    Strict Bedrest: PT will check back as time allows.       Electronically signed by Jason Mendoza PT on 2021 at 1:38 PM

## 2021-06-04 NOTE — PROGRESS NOTES
Today's Date: 6/4/2021  Patient Name: Toribio Jon  Date of admission: 5/27/2021  2:18 AM  Patient's age: 77 y. o., 1945  Admission Dx: Acute renal failure (ARF) ([facility] [address] [N17.9]    Reason for Consult: management recommendations  Requesting Physician: Ligia Alvarado DO    CHIEF COMPLAINT: Compression fracture of lumbar vertebra    Subjective:  Patient underwent L2-3 laminectomy and T12-L4 fusion yesterday 6/3/2021  Doing well postop. Elizabeth removed  Tolerating pain and not asking for medications  Vitals stable  Labs reviewed      Serum IFX Interp 05/27/2021  2:12  [address]   A ZONE OF RESTRICTION IS PRESENT IN THE BETA GLOBULIN REGION.  CONFIRMED BY IMMUNOFIXATION TO BE MONOCLONAL   Comment:   IgA LAMBDA (1.31 G/DL). FREE MONOCLONAL LIGHT CHAINS ARE PRESENT, IDENTIFIED AS      MRI thoracic spine:  Discrete enhancing lesions involving the spinous processes of the vertebral   bodies of T4, T6 and T7 and anterior aspect of vertebral body of T4   concerning for metastatic disease.       Diffuse T1 and T2 hypointensity of the marrow which may be effect of anemia   or chemotherapy.       Only partially included on the acquired images there is a diffuse   infiltrative mass lesion surrounding the anterior and lateral aspect of L1   vertebral body.  Clinical correlation and if appropriate contrast enhanced CT   of abdomen and pelvis is recommended. MRI cervical spine  Anterior aspect of C4 has enhancing lesion.  Finding may be related to   atypical hemangioma or marrow infiltration.       At C5-C6, moderate canal stenosis and severe bilateral neural foraminal   narrowing.  Left foraminal disc protrusion/osteophytic ridging impinges on   the exiting left C6 nerve root         HISTORY OF PRESENT ILLNESS:      The patient is a 77 y.o. female with no significant past medical history, she initially presented to the ER at [facility] facility for worsening lower back pain, was found to have UTI with right ureteral stone and was treated with Bactrim for 7 days. Patient's back pain continued to worsen so follow-up MRI was done which showed burst L2 fracture with a 6 mm retropulsion of spinal canal with moderate canal narrowing. Patient was then transferred to [facility] for neurosurgery evaluation and admission. Currently, patient is awake alert sitting comfortably no acute distress. Vitals and labs reviewed patient admits to decreased appetite and weight loss since January. Neurosurgery has been consulted, will follow up on repeat CT chest abdomen and pelvis and MRI cervical and thoracic spine. Patient underwent L2-3 laminectomy and T12-L4 fusion yesterday 6/3/2021    Past Medical History:   has a past medical history of Acute renal failure (ARF) (HCC), Compression fracture of lumbar vertebra ([address] Essential hypertension, Other hyperlipidemia, and Other specified hypothyroidism. Past Surgical History:   has a past surgical history that includes Finger trigger release (Left); back surgery (06/03/2021); and lumbar fusion (N/A, 6/3/2021). Medications:    Prior to Admission medications    Medication Sig Start Date End Date Taking?  Authorizing Provider   naproxen (NAPROSYN) 500 MG tablet Take 500 mg by mouth 2 times daily (with meals)   Yes Historical Provider, MD   latanoprost (XALATAN) 0.005 % ophthalmic solution Place 1 drop into both eyes nightly   Yes Historical Provider, MD   levothyroxine (SYNTHROID) 50 MCG tablet Take 50 mcg by mouth Daily   Yes Historical Provider, MD   lisinopril (PRINIVIL;ZESTRIL) 20 MG tablet Take 20 mg by mouth daily   Yes Historical Provider, MD   simvastatin (ZOCOR) 20 MG tablet Take 20 mg by mouth nightly   Yes Historical Provider, MD   amLODIPine (NORVASC) 5 MG tablet Take 5 mg by mouth daily   Yes Historical Provider, MD   aspirin 81 MG chewable tablet Take 81 mg by mouth daily   Yes Historical Provider, MD     Current Facility-Administered flush 0.9 % injection 5-40 mL  5-40 mL Intravenous PRN Zackery Olivo MD        0.9 % sodium chloride infusion  25 mL Intravenous PRN Zackery Olivo MD        nicotine (NICODERM CQ) 21 MG/24HR 1 patch  1 patch Transdermal Daily PRN Zackery Olivo MD        acetaminophen (TYLENOL) tablet 650 mg  650 mg Oral Q6H PRN Zackery Olivo MD   650 mg at 05/27/21 2147    Or    acetaminophen (TYLENOL) suppository 650 mg  650 mg Rectal Q6H PRN Zackery Olivo MD        latanoprost (XALATAN) 0.005 % ophthalmic solution 1 drop  1 drop Both Eyes Nightly Zackery Olivo MD   1 drop at 06/02/21 2057    levothyroxine (SYNTHROID) tablet 50 mcg  50 mcg Oral Daily Zackery Olivo MD   50 mcg at 06/04/21 0857    atorvastatin (LIPITOR) tablet 20 mg  20 mg Oral Daily Zackery Olivo MD   20 mg at 06/02/21 1835    sodium chloride flush 0.9 % injection 10 mL  10 mL Intravenous PRN Zackery Olivo MD           Allergies:  Patient has no known allergies. Social History:   reports that she has never smoked. She has never used smokeless tobacco. She reports previous alcohol use. She reports that she does not use drugs. Family History: family history includes No Known Problems in her father and mother. REVIEW OF SYSTEMS:      Constitutional: No fever or chills. No night sweats, no weight loss   Eyes: No eye discharge, double vision, or eye pain   HEENT: negative for sore mouth, sore throat, hoarseness and voice change   Respiratory: negative for cough , sputum, dyspnea, wheezing, hemoptysis, chest pain   Cardiovascular: negative for chest pain, dyspnea, palpitations, orthopnea, PND   Gastrointestinal: negative for nausea, vomiting, diarrhea, constipation, abdominal pain, Dysphagia, hematemesis and hematochezia   Genitourinary: negative for frequency, dysuria, nocturia, urinary incontinence, and hematuria   Integument: negative for rash, skin lesions, bruises.    Hematologic/Lymphatic: negative for easy bruising, bleeding, lymphadenopathy, or petechiae   Endocrine: negative for heat or cold intolerance,weight changes, change in bowel habits and hair loss   Musculoskeletal: negative for myalgias, arthralgias, pain, joint swelling,and bone pain   Neurological: negative for headaches, dizziness, seizures, weakness, numbness    PHYSICAL EXAM:        BP (!) 122/52   Pulse 92   Temp 98.3 °F (36.8 °C) (Oral)   Resp 13   Ht 5' (1.524 m)   Wt 98 lb (44.5 kg)   SpO2 92%   BMI 19.14 kg/m²    Temp (24hrs), Av.6 °F (36.4 °C), Min:87.3 °F (30.7 °C), Max:99.9 °F (37.7 °C)      General appearance - well appearing, no in pain or distress   Mental status - alert and cooperative   Eyes - pupils equal and reactive, extraocular eye movements intact   Ears - bilateral TM's and external ear canals normal   Mouth - mucous membranes moist, pharynx normal without lesions   Neck - supple, no significant adenopathy   Lymphatics - no palpable lymphadenopathy, no hepatosplenomegaly   Chest - clear to auscultation, no wheezes, rales or rhonchi, symmetric air entry   Heart - normal rate, regular rhythm, normal S1, S2, no murmurs  Abdomen - soft, nontender, nondistended, no masses or organomegaly   Neurological -alert and oriented. No gross motor or sensory deficit  Musculoskeletal -lumbar spinal tenderness present  Extremities - peripheral pulses normal, no pedal edema, no clubbing or cyanosis   Skin - normal coloration and turgor, no rashes, no suspicious skin lesions noted ,      DATA:      Labs:     Results for orders placed or performed during the hospital encounter of 21   COVID-19, Rapid    Specimen: Nasopharyngeal Swab   Result Value Ref Range    Specimen Description . NASOPHARYNGEAL SWAB     SARS-CoV-2, Rapid Not Detected Not Detected   Urinalysis with microscopic   Result Value Ref Range    Color, UA ORANGE (A) YELLOW    Turbidity UA CLEAR CLEAR    Glucose, Ur NEGATIVE NEGATIVE    Bilirubin Urine Phosphatase 52 35 - 104 U/L    ALT 11 5 - 33 U/L    AST 16 <32 U/L    Total Bilirubin 0.24 (L) 0.3 - 1.2 mg/dL    Total Protein 7.2 6.4 - 8.3 g/dL    Albumin 3.2 (L) 3.5 - 5.2 g/dL    Albumin/Globulin Ratio 0.8 (L) 1.0 - 2.5    GFR Non- 33 (L) >60 mL/min    GFR African American 40 (L) >60 mL/min    GFR Comment          GFR Staging NOT REPORTED    Magnesium   Result Value Ref Range    Magnesium 1.8 1.6 - 2.6 mg/dL   Protime-INR   Result Value Ref Range    Protime 12.4 (H) 9.1 - 12.3 sec    INR 1.2    Vitamin B12 & Folate   Result Value Ref Range    Vitamin B-12 857 232 - 1245 pg/mL    Folate >20.0 >4.8 ng/mL   Iron and TIBC   Result Value Ref Range    Iron 75 37 - 145 ug/dL    TIBC 215 (L) 250 - 450 ug/dL    Iron Saturation 35 20 - 55 %    UIBC 140 112 - 347 ug/dL   Ferritin   Result Value Ref Range    Ferritin 292 (H) 13 - 150 ug/L   Reticulocytes   Result Value Ref Range    Retic % 1.6 0.5 - 1.9 %    Absolute Retic # 0.050 0.030 - 0.080 M/uL    Immature Retic Fract 12.400 2.7 - 18.3 %    Retic Hemoglobin 30.8 28.2 - 35.7 pg   TSH with Reflex   Result Value Ref Range    TSH 7.01 (H) 0.30 - 5.00 mIU/L   Electrophoresis Protein, Serum without Reflex to Immunofixation   Result Value Ref Range    Total Protein 6.8 6.4 - 8.3 g/dL    Albumin (calculated) 3.3 3.2 - 5.2 g/dL    Albumin % 49 45 - 65 %    Alpha-1-Globulin 0.2 0.1 - 0.4 g/dL    Alpha 1 % 3 3 - 6 %    Alpha-2-Globulin 0.9 0.5 - 0.9 g/dL    Alpha 2 % 13 6 - 13 %    Beta Globulin 2.1 (H) 0.5 - 1.1 g/dL    Beta Percent 31 (H) 11 - 19 %    Gamma Globulin 0.3 (L) 0.5 - 1.5 g/dL    Gamma Globulin % 4 (L) 9 - 20 %    Total Prot. Sum 6.8 6.3 - 8.2 g/dL    Total Prot. Sum,% 100 98 - 102 %    Protein Electrophoresis, Serum       A ZONE OF RESTRICTION IS PRESENT IN THE BETA GLOBULIN REGION. CONFIRMED BY IMMUNOFIXATION TO BE MONOCLONAL    Pathologist ELECTRONICALLY SIGNED. Rosendo Correa M.D.     IMMUNOFIXATION SERUM PROFILE   Result Value Ref Range Serum IFX Interp       A ZONE OF RESTRICTION IS PRESENT IN THE BETA GLOBULIN REGION. CONFIRMED BY IMMUNOFIXATION TO BE MONOCLONAL    Pathologist ELECTRONICALLY SIGNED. Ricardo Fletcher M.D.    KAPPA/LAMBDA QUANT FREE LIGHT CHAINS SERUM   Result Value Ref Range    Kappa Free Light Chains QNT 0.97 0.37 - 1.94 mg/dL    Lambda Free Light Chains .34 (H) 0.57 - 2.63 mg/dL    Free Kappa/Lambda Ratio 0.01 (L) 0.26 - 1.65   IMMUNOGLOBULINS PANEL   Result Value Ref Range    IgG <300 (L) 700 - 1600 mg/dL    IgA 2250 (H) 70 - 400 mg/dL    IgM <25 (L) 40 - 230 mg/dL   T4, Free   Result Value Ref Range    Thyroxine, Free 1.35 0.93 - 1.70 ng/dL   PROTEIN ELECTROPHORESIS, URINE   Result Value Ref Range    Specimen Type . URINE     Urine Total Protein 180 mg/dL    P E Interpretation, U       MONOCLONAL IMMUNOGLOBULIN IS PRESENT AND IDENTIFIED AS    Pathologist ELECTRONICALLY SIGNED.  Ricardo Fletcher M.D.    CBC   Result Value Ref Range    WBC 7.6 3.5 - 11.3 k/uL    RBC 3.08 (L) 3.95 - 5.11 m/uL    Hemoglobin 8.7 (L) 11.9 - 15.1 g/dL    Hematocrit 29.5 (L) 36.3 - 47.1 %    MCV 95.8 82.6 - 102.9 fL    MCH 28.2 25.2 - 33.5 pg    MCHC 29.5 28.4 - 34.8 g/dL    RDW 13.3 11.8 - 14.4 %    Platelets 058 067 - 127 k/uL    MPV 9.4 8.1 - 13.5 fL    NRBC Automated 0.0 0.0 per 100 WBC   Comprehensive Metabolic Panel w/ Reflex to MG   Result Value Ref Range    Glucose 83 70 - 99 mg/dL    BUN 25 (H) 8 - 23 mg/dL    CREATININE 1.61 (H) 0.50 - 0.90 mg/dL    Bun/Cre Ratio NOT REPORTED 9 - 20    Calcium 8.5 (L) 8.6 - 10.4 mg/dL    Sodium 140 135 - 144 mmol/L    Potassium 3.9 3.7 - 5.3 mmol/L    Chloride 107 98 - 107 mmol/L    CO2 23 20 - 31 mmol/L    Anion Gap 10 9 - 17 mmol/L    Alkaline Phosphatase 47 35 - 104 U/L    ALT 9 5 - 33 U/L    AST 15 <32 U/L    Total Bilirubin 0.20 (L) 0.3 - 1.2 mg/dL    Total Protein 6.8 6.4 - 8.3 g/dL    Albumin 3.0 (L) 3.5 - 5.2 g/dL    Albumin/Globulin Ratio 0.8 (L) 1.0 - 2.5    GFR Non- 31 (L) >60 mL/min    GFR  38 (L) >60 mL/min    GFR Comment          GFR Staging NOT REPORTED    BETA 2 MICROGLOBULIN, SERUM   Result Value Ref Range    Beta-2 Microglobulin 6.9 (H) 0.6 - 2.4 mg/L   Protein / Creatinine Ratio, Urine   Result Value Ref Range    Total Protein, Urine 180 mg/dL    Creatinine, Ur 30.5 28.0 - 217.0 mg/dL    Urine Total Protein Creatinine Ratio 5.90 (H) 0.00 - 0.20   URINALYSIS WITH MICROSCOPIC   Result Value Ref Range    Color, UA YELLOW YELLOW    Turbidity UA CLEAR CLEAR    Glucose, Ur NEGATIVE NEGATIVE    Bilirubin Urine NEGATIVE NEGATIVE    Ketones, Urine NEGATIVE NEGATIVE    Specific Gravity, UA 1.010 1.005 - 1.030    Urine Hgb LARGE (A) NEGATIVE    pH, UA 6.0 5.0 - 8.0    Protein, UA 2+ (A) NEGATIVE    Urobilinogen, Urine Normal Normal    Nitrite, Urine NEGATIVE NEGATIVE    Leukocyte Esterase, Urine NEGATIVE NEGATIVE    -          WBC, UA 5 TO 10 0 - 5 /HPF    RBC, UA TOO NUMEROUS TO COUNT 0 - 4 /HPF    Casts UA  0 - 8 /LPF     2 TO 5 HYALINE Reference range defined for non-centrifuged specimen. Crystals, UA NOT REPORTED None /HPF    Epithelial Cells UA 0 TO 2 0 - 5 /HPF    Renal Epithelial, UA NOT REPORTED 0 /HPF    Bacteria, UA NOT REPORTED None    Mucus, UA NOT REPORTED None    Trichomonas, UA NOT REPORTED None    Amorphous, UA NOT REPORTED None    Other Observations UA NOT REPORTED NOT REQ.     Yeast, UA NOT REPORTED None   CBC   Result Value Ref Range    WBC 8.0 3.5 - 11.3 k/uL    RBC 2.86 (L) 3.95 - 5.11 m/uL    Hemoglobin 8.1 (L) 11.9 - 15.1 g/dL    Hematocrit 27.5 (L) 36.3 - 47.1 %    MCV 96.2 82.6 - 102.9 fL    MCH 28.3 25.2 - 33.5 pg    MCHC 29.5 28.4 - 34.8 g/dL    RDW 13.4 11.8 - 14.4 %    Platelets 640 266 - 282 k/uL    MPV 9.7 8.1 - 13.5 fL    NRBC Automated 0.0 0.0 per 100 WBC   Comprehensive Metabolic Panel w/ Reflex to MG   Result Value Ref Range    Glucose 82 70 - 99 mg/dL    BUN 25 (H) 8 - 23 mg/dL    CREATININE 1.18 (H) 0.50 - 0.90 mg/dL    Bun/Cre American 50 (L) >60 mL/min    GFR Comment          GFR Staging NOT REPORTED    BASIC METABOLIC PANEL   Result Value Ref Range    Glucose 93 70 - 99 mg/dL    BUN 21 8 - 23 mg/dL    CREATININE 1.28 (H) 0.50 - 0.90 mg/dL    Bun/Cre Ratio NOT REPORTED 9 - 20    Calcium 9.0 8.6 - 10.4 mg/dL    Sodium 139 135 - 144 mmol/L    Potassium 3.8 3.7 - 5.3 mmol/L    Chloride 101 98 - 107 mmol/L    CO2 26 20 - 31 mmol/L    Anion Gap 12 9 - 17 mmol/L    GFR Non-African American 41 (L) >60 mL/min    GFR  49 (L) >60 mL/min    GFR Comment          GFR Staging NOT REPORTED    CBC   Result Value Ref Range    WBC 8.3 3.5 - 11.3 k/uL    RBC 3.07 (L) 3.95 - 5.11 m/uL    Hemoglobin 8.7 (L) 11.9 - 15.1 g/dL    Hematocrit 28.3 (L) 36.3 - 47.1 %    MCV 92.2 82.6 - 102.9 fL    MCH 28.3 25.2 - 33.5 pg    MCHC 30.7 28.4 - 34.8 g/dL    RDW 13.5 11.8 - 14.4 %    Platelets 294 052 - 798 k/uL    MPV 10.2 8.1 - 13.5 fL    NRBC Automated 0.0 0.0 per 100 WBC   BASIC METABOLIC PANEL   Result Value Ref Range    Glucose 86 70 - 99 mg/dL    BUN 28 (H) 8 - 23 mg/dL    CREATININE 1.46 (H) 0.50 - 0.90 mg/dL    Bun/Cre Ratio NOT REPORTED 9 - 20    Calcium 8.9 8.6 - 10.4 mg/dL    Sodium 136 135 - 144 mmol/L    Potassium 4.1 3.7 - 5.3 mmol/L    Chloride 101 98 - 107 mmol/L    CO2 27 20 - 31 mmol/L    Anion Gap 8 (L) 9 - 17 mmol/L    GFR Non-African American 35 (L) >60 mL/min    GFR  42 (L) >60 mL/min    GFR Comment          GFR Staging NOT REPORTED    BASIC METABOLIC PANEL   Result Value Ref Range    Glucose 90 70 - 99 mg/dL    BUN 29 (H) 8 - 23 mg/dL    CREATININE 1.35 (H) 0.50 - 0.90 mg/dL    Bun/Cre Ratio NOT REPORTED 9 - 20    Calcium 9.1 8.6 - 10.4 mg/dL    Sodium 143 135 - 144 mmol/L    Potassium 3.5 (L) 3.7 - 5.3 mmol/L    Chloride 102 98 - 107 mmol/L    CO2 27 20 - 31 mmol/L    Anion Gap 14 9 - 17 mmol/L    GFR Non-African American 38 (L) >60 mL/min    GFR  46 (L) >60 mL/min    GFR Comment          GFR Staging NOT REPORTED    Calcium, Ionized   Result Value Ref Range    Calcium, Ion 1.25 1.13 - 1.33 mmol/L   OPEN HEART PANEL   Result Value Ref Range    pH, Arterial 7.374 7.350 - 7.450    pCO2, Arterial 38.5 32 - 45 mmHg    pO2, Arterial 244.0 (H) 75 - 95 mmHg    HCO3, Arterial 21.9 (L) 22 - 27 mmol/L    Positive Base Excess, Art NOT REPORTED 0.0 - 2.0 mmol/L    Negative Base Excess, Art 2.4 (H) 0.0 - 2.0 mmol/L    O2 Sat, Arterial 99.6 94 - 100 %    Total Hb NOT REPORTED 12.0 - 16.0 g/dl    Oxyhemoglobin NOT REPORTED 95.0 - 98.0 %    Carboxyhemoglobin 1.0 0 - 5 %    Methemoglobin NOT REPORTED 0.0 - 1.5 %    Pt Temp 37.0     pH, Art, Temp Adj NOT REPORTED 7.350 - 7.450    pCO2, Art, Temp Adj NOT REPORTED 32 - 45    pO2, Art, Temp Adj NOT REPORTED 75 - 95 mmHg    O2 Device/Flow/% NOT REPORTED     Respiratory Rate NOT REPORTED     Sterling Test INFORMATION NOT PROVIDED     Sample Site NOT REPORTED     Pt.  Position NOT REPORTED     Mode NOT REPORTED     Set Rate NOT REPORTED     Total Rate NOT REPORTED     VT NOT REPORTED     FIO2 57%     Peep/Cpap NOT REPORTED     PSV NOT REPORTED     Text for Respiratory NOT REPORTED     NOTIFICATION NOT REPORTED     NOTIFICATION TIME NOT REPORTED     Hemoglobin 8.7 gm/dL    Hematocrit 27.2 %    POC Glucose 91 65 - 105 mg/dL    Chloride, Whole Blood 106 98 - 110 mmol/L    Potassium, Whole Blood 3.3 (L) 3.6 - 5.0 mmol/L    Sodium, Whole Blood 139 136 - 145 mmol/L   BASIC METABOLIC PANEL   Result Value Ref Range    Glucose 147 (H) 70 - 99 mg/dL    BUN 31 (H) 8 - 23 mg/dL    CREATININE 1.68 (H) 0.50 - 0.90 mg/dL    Bun/Cre Ratio NOT REPORTED 9 - 20    Calcium 8.2 (L) 8.6 - 10.4 mg/dL    Sodium 138 135 - 144 mmol/L    Potassium 4.1 3.7 - 5.3 mmol/L    Chloride 103 98 - 107 mmol/L    CO2 20 20 - 31 mmol/L    Anion Gap 15 9 - 17 mmol/L    GFR Non-African American 30 (L) >60 mL/min    GFR  36 (L) >60 mL/min    GFR Comment          GFR Staging NOT REPORTED    POC Glucose Fingerstick   Result Value Ref Range    POC Glucose 82 65 - 105 mg/dL   POC Glucose Fingerstick   Result Value Ref Range    POC Glucose 96 65 - 105 mg/dL   EKG 12 Lead   Result Value Ref Range    Ventricular Rate 103 BPM    Atrial Rate 103 BPM    P-R Interval 144 ms    QRS Duration 76 ms    Q-T Interval 326 ms    QTc Calculation (Bazett) 427 ms    P Axis 51 degrees    R Axis -21 degrees    T Axis 11 degrees   TYPE AND SCREEN   Result Value Ref Range    Expiration Date 05/31/2021,2359     Arm Band Number [id number]     ABO/Rh A POSITIVE     Antibody Screen NEGATIVE    BLOOD BANK SPECIMEN   Result Value Ref Range    Blood Bank Specimen NOT REPORTED    TYPE AND SCREEN   Result Value Ref Range    Expiration Date 06/04/2021,2359     Arm Band Number [id number]     ABO/Rh A POSITIVE     Antibody Screen NEGATIVE    BLOOD BANK SPECIMEN   Result Value Ref Range    Blood Bank Specimen NOT REPORTED          IMAGING DATA:    US RENAL COMPLETE    Result Date: 5/27/2021  EXAMINATION: RETROPERITONEAL ULTRASOUND OF THE KIDNEYS AND URINARY BLADDER 5/27/2021 COMPARISON: None HISTORY: ORDERING SYSTEM PROVIDED HISTORY: ARF TECHNOLOGIST PROVIDED HISTORY: US RETROPERITONEAL COMPLETE ARF FINDINGS: Kidneys: The right kidney measures 10.0 x 4.0 x 3.5 cm and the left kidney measures 10.5 x 3.3 x 5.2 cm. Cortical thickness within normal limits bilaterally. There is diffuse increased renal parenchymal echogenicity bilaterally with increased prominence of the relative hypoechoic renal pyramids. A tiny 7 x 5 x 6 mm right lower pole renal cyst is noted. No other focal renal lesion. No hydronephrosis in either kidney. No sonographic evidence for renal calculi. Bladder: Unremarkable appearance of the bladder. No dilated distal ureters. The patient did not have the urge to void. Bilateral ureteral jets were identified. Diffuse increased renal parenchymal echogenicity bilaterally consistent with medical renal disease.  No hydronephrosis in either kidney. 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 Pathological fracture of L2 with cord compression. Differentials could be benign fracture, primary bone tumor or metastatic cancer with unknown primary at this point. Will need more work-up. 2. Patient underwent L2-3 laminectomy and T12-L4 fusion yesterday 6/3/2021  3. IgA lambda multiple myeloma. 4. AMERICO  5. 6 mm retropulsion of spinal canal with moderate canal narrowing    RECOMMENDATIONS:    1. She is doing well postop. Repeat CT spines as per neurosurgery today  2. Patient will benefit from radiation therapy and systemic chemotherapy. Management plan explained to the patient and family  3. Follow-up outpatient for treatment after recovery from surgery  4. We will do a bone marrow test down the line as outpatient, avoid now due to back complication  5.  We will continue to follow while inpatient    Phan Nguyen MD  PGY-3 Internal Medicine Resident  [address], [place], [place]  6/4/2021 11:35 AM                                                  Attending Physician Statement I have discussed the care of [facility], including pertinent history and exam findings with the resident. I have reviewed the key elements of all parts of the encounter with the resident. I have seen and examined the patient with the resident. I agree with the assessment and plan and status of the problem list as documented. [facility]                                 This note is created with the assistance of a speech recognition program.  While intending to generate a document that actually reflects the content of the visit, the document can still have some errors including those of syntax and sound a like substitutions which may escape proof reading. It such instances, actual meaning can be extrapolated by contextual diversion.

## 2021-06-05 LAB
ANION GAP SERPL CALCULATED.3IONS-SCNC: 10 MMOL/L (ref 9–17)
BLOOD BANK SPECIMEN: NORMAL
BUN BLDV-MCNC: 45 MG/DL (ref 8–23)
BUN/CREAT BLD: ABNORMAL (ref 9–20)
CALCIUM SERPL-MCNC: 8.1 MG/DL (ref 8.6–10.4)
CHLORIDE BLD-SCNC: 103 MMOL/L (ref 98–107)
CO2: 21 MMOL/L (ref 20–31)
CREAT SERPL-MCNC: 2.15 MG/DL (ref 0.5–0.9)
GFR AFRICAN AMERICAN: 27 ML/MIN
GFR NON-AFRICAN AMERICAN: 22 ML/MIN
GFR SERPL CREATININE-BSD FRML MDRD: ABNORMAL ML/MIN/{1.73_M2}
GFR SERPL CREATININE-BSD FRML MDRD: ABNORMAL ML/MIN/{1.73_M2}
GLUCOSE BLD-MCNC: 139 MG/DL (ref 70–99)
HCT VFR BLD CALC: 20 % (ref 36.3–47.1)
HCT VFR BLD CALC: 20.9 % (ref 36.3–47.1)
HEMOGLOBIN: 6.3 G/DL (ref 11.9–15.1)
HEMOGLOBIN: 6.3 G/DL (ref 11.9–15.1)
MCH RBC QN AUTO: 28.9 PG (ref 25.2–33.5)
MCHC RBC AUTO-ENTMCNC: 31.5 G/DL (ref 28.4–34.8)
MCV RBC AUTO: 91.7 FL (ref 82.6–102.9)
NRBC AUTOMATED: 0 PER 100 WBC
PDW BLD-RTO: 13.8 % (ref 11.8–14.4)
PLATELET # BLD: 237 K/UL (ref 138–453)
PMV BLD AUTO: 10 FL (ref 8.1–13.5)
POTASSIUM SERPL-SCNC: 4.1 MMOL/L (ref 3.7–5.3)
RBC # BLD: 2.18 M/UL (ref 3.95–5.11)
SODIUM BLD-SCNC: 134 MMOL/L (ref 135–144)
WBC # BLD: 13.6 K/UL (ref 3.5–11.3)

## 2021-06-05 PROCEDURE — 85018 HEMOGLOBIN: CPT

## 2021-06-05 PROCEDURE — 99232 SBSQ HOSP IP/OBS MODERATE 35: CPT | Performed by: INTERNAL MEDICINE

## 2021-06-05 PROCEDURE — 6370000000 HC RX 637 (ALT 250 FOR IP): Performed by: STUDENT IN AN ORGANIZED HEALTH CARE EDUCATION/TRAINING PROGRAM

## 2021-06-05 PROCEDURE — 2580000003 HC RX 258: Performed by: STUDENT IN AN ORGANIZED HEALTH CARE EDUCATION/TRAINING PROGRAM

## 2021-06-05 PROCEDURE — 85014 HEMATOCRIT: CPT

## 2021-06-05 PROCEDURE — 86900 BLOOD TYPING SEROLOGIC ABO: CPT

## 2021-06-05 PROCEDURE — 94761 N-INVAS EAR/PLS OXIMETRY MLT: CPT

## 2021-06-05 PROCEDURE — P9016 RBC LEUKOCYTES REDUCED: HCPCS

## 2021-06-05 PROCEDURE — 86920 COMPATIBILITY TEST SPIN: CPT

## 2021-06-05 PROCEDURE — 80048 BASIC METABOLIC PNL TOTAL CA: CPT

## 2021-06-05 PROCEDURE — 86850 RBC ANTIBODY SCREEN: CPT

## 2021-06-05 PROCEDURE — 36430 TRANSFUSION BLD/BLD COMPNT: CPT

## 2021-06-05 PROCEDURE — APPNB30 APP NON BILLABLE TIME 0-30 MINS: Performed by: REGISTERED NURSE

## 2021-06-05 PROCEDURE — 85027 COMPLETE CBC AUTOMATED: CPT

## 2021-06-05 PROCEDURE — 2060000000 HC ICU INTERMEDIATE R&B

## 2021-06-05 PROCEDURE — 86901 BLOOD TYPING SEROLOGIC RH(D): CPT

## 2021-06-05 PROCEDURE — 36415 COLL VENOUS BLD VENIPUNCTURE: CPT

## 2021-06-05 RX ORDER — SODIUM CHLORIDE 9 MG/ML
INJECTION, SOLUTION INTRAVENOUS PRN
Status: DISCONTINUED | OUTPATIENT
Start: 2021-06-05 | End: 2021-06-10 | Stop reason: HOSPADM

## 2021-06-05 RX ADMIN — METHOCARBAMOL TABLETS 500 MG: 500 TABLET, COATED ORAL at 14:49

## 2021-06-05 RX ADMIN — OXYCODONE HYDROCHLORIDE AND ACETAMINOPHEN 1 TABLET: 5; 325 TABLET ORAL at 21:40

## 2021-06-05 RX ADMIN — SODIUM CHLORIDE, PRESERVATIVE FREE 10 ML: 5 INJECTION INTRAVENOUS at 20:39

## 2021-06-05 RX ADMIN — DOCUSATE SODIUM 200 MG: 100 CAPSULE ORAL at 10:45

## 2021-06-05 RX ADMIN — SODIUM CHLORIDE: 9 INJECTION, SOLUTION INTRAVENOUS at 20:47

## 2021-06-05 RX ADMIN — OXYCODONE HYDROCHLORIDE AND ACETAMINOPHEN 1 TABLET: 5; 325 TABLET ORAL at 08:53

## 2021-06-05 RX ADMIN — OXYCODONE HYDROCHLORIDE AND ACETAMINOPHEN 1 TABLET: 5; 325 TABLET ORAL at 17:37

## 2021-06-05 RX ADMIN — AMLODIPINE BESYLATE 10 MG: 10 TABLET ORAL at 10:46

## 2021-06-05 RX ADMIN — ATORVASTATIN CALCIUM 20 MG: 20 TABLET, FILM COATED ORAL at 20:37

## 2021-06-05 RX ADMIN — METHOCARBAMOL TABLETS 500 MG: 500 TABLET, COATED ORAL at 20:39

## 2021-06-05 RX ADMIN — DOCUSATE SODIUM 50MG AND SENNOSIDES 8.6MG 2 TABLET: 8.6; 5 TABLET, FILM COATED ORAL at 10:46

## 2021-06-05 RX ADMIN — POLYETHYLENE GLYCOL 3350 17 G: 17 POWDER, FOR SOLUTION ORAL at 10:50

## 2021-06-05 RX ADMIN — OXYCODONE HYDROCHLORIDE AND ACETAMINOPHEN 1 TABLET: 5; 325 TABLET ORAL at 12:36

## 2021-06-05 RX ADMIN — LEVOTHYROXINE SODIUM 50 MCG: 50 TABLET ORAL at 10:46

## 2021-06-05 RX ADMIN — METHOCARBAMOL TABLETS 500 MG: 500 TABLET, COATED ORAL at 04:18

## 2021-06-05 RX ADMIN — LATANOPROST 1 DROP: 50 SOLUTION OPHTHALMIC at 20:38

## 2021-06-05 RX ADMIN — DOCUSATE SODIUM 200 MG: 100 CAPSULE ORAL at 20:38

## 2021-06-05 ASSESSMENT — PAIN DESCRIPTION - ORIENTATION: ORIENTATION: LOWER

## 2021-06-05 ASSESSMENT — PAIN DESCRIPTION - PAIN TYPE
TYPE: SURGICAL PAIN
TYPE: ACUTE PAIN;SURGICAL PAIN

## 2021-06-05 ASSESSMENT — PAIN SCALES - GENERAL
PAINLEVEL_OUTOF10: 8
PAINLEVEL_OUTOF10: 10
PAINLEVEL_OUTOF10: 10
PAINLEVEL_OUTOF10: 5
PAINLEVEL_OUTOF10: 7
PAINLEVEL_OUTOF10: 10
PAINLEVEL_OUTOF10: 9

## 2021-06-05 ASSESSMENT — ENCOUNTER SYMPTOMS
VOMITING: 0
CONSTIPATION: 1
SHORTNESS OF BREATH: 0
COUGH: 0
ABDOMINAL PAIN: 0
NAUSEA: 0
BACK PAIN: 1

## 2021-06-05 ASSESSMENT — PAIN DESCRIPTION - ONSET: ONSET: ON-GOING

## 2021-06-05 ASSESSMENT — PAIN DESCRIPTION - LOCATION
LOCATION: BACK
LOCATION: BACK

## 2021-06-05 NOTE — PROGRESS NOTES
Dr. Bienvenido Orellana is good with transfusion. Dr. Azucena Scanlon ordered one unit with protocol for transfusion. Message sent via [employer] system to hematology about hemoglobin. Dr Bienvenido Orellana on call for Dr. Avtar Jiang.

## 2021-06-05 NOTE — PROGRESS NOTES
Today's Date: 6/5/2021  Patient Name: Luiza Torres  Date of admission: 5/27/2021  2:18 AM  Patient's age: 77 y. o., 1945  Admission Dx: Acute renal failure (ARF) ([facility] [address] [N17.9]    Reason for Consult: management recommendations  Requesting Physician: Rani Lorenzana DO    CHIEF COMPLAINT: Compression fracture of lumbar vertebra    Subjective:  Patient seen and examined bedside  She is postop day 2  She did not have a great night because she was in a lot of pain. She is not on PCA  Percocet is helped as per her and she is better this morning  Vitals stable  Improving postop      Serum IFX Interp 05/27/2021  2:12  [address]   A ZONE OF RESTRICTION IS PRESENT IN THE BETA GLOBULIN REGION.  CONFIRMED BY IMMUNOFIXATION TO BE MONOCLONAL   Comment:   IgA LAMBDA (1.31 G/DL). FREE MONOCLONAL LIGHT CHAINS ARE PRESENT, IDENTIFIED AS      MRI thoracic spine:  Discrete enhancing lesions involving the spinous processes of the vertebral   bodies of T4, T6 and T7 and anterior aspect of vertebral body of T4   concerning for metastatic disease.       Diffuse T1 and T2 hypointensity of the marrow which may be effect of anemia   or chemotherapy.       Only partially included on the acquired images there is a diffuse   infiltrative mass lesion surrounding the anterior and lateral aspect of L1   vertebral body.  Clinical correlation and if appropriate contrast enhanced CT   of abdomen and pelvis is recommended. MRI cervical spine  Anterior aspect of C4 has enhancing lesion.  Finding may be related to   atypical hemangioma or marrow infiltration.       At C5-C6, moderate canal stenosis and severe bilateral neural foraminal   narrowing.  Left foraminal disc protrusion/osteophytic ridging impinges on   the exiting left C6 nerve root         HISTORY OF PRESENT ILLNESS:      The patient is a 77 y.o. female with no significant past medical history, she initially presented to the ER at [facility] Intravenous PRN Ada Trejo MD        0.9 % sodium chloride infusion  25 mL Intravenous PRN Ada Trejo MD        nicotine (NICODERM CQ) 21 MG/24HR 1 patch  1 patch Transdermal Daily PRN Ada Trejo MD        acetaminophen (TYLENOL) tablet 650 mg  650 mg Oral Q6H PRN Ada Trejo MD   650 mg at 05/27/21 2147    Or    acetaminophen (TYLENOL) suppository 650 mg  650 mg Rectal Q6H PRN Ada Trejo MD        latanoprost (XALATAN) 0.005 % ophthalmic solution 1 drop  1 drop Both Eyes Nightly Ada Trejo MD   1 drop at 06/04/21 2222    levothyroxine (SYNTHROID) tablet 50 mcg  50 mcg Oral Daily Ada Trejo MD   50 mcg at 06/05/21 1046    atorvastatin (LIPITOR) tablet 20 mg  20 mg Oral Daily Ada Trejo MD   20 mg at 06/04/21 2045    sodium chloride flush 0.9 % injection 10 mL  10 mL Intravenous PRN Ada Trejo MD           Allergies:  Patient has no known allergies. Social History:   reports that she has never smoked. She has never used smokeless tobacco. She reports previous alcohol use. She reports that she does not use drugs. Family History: family history includes No Known Problems in her father and mother. REVIEW OF SYSTEMS:      Constitutional: No fever or chills. No night sweats, no weight loss   Eyes: No eye discharge, double vision, or eye pain   HEENT: negative for sore mouth, sore throat, hoarseness and voice change   Respiratory: negative for cough , sputum, dyspnea, wheezing, hemoptysis, chest pain   Cardiovascular: negative for chest pain, dyspnea, palpitations, orthopnea, PND   Gastrointestinal: negative for nausea, vomiting, diarrhea, constipation, abdominal pain, Dysphagia, hematemesis and hematochezia   Genitourinary: negative for frequency, dysuria, nocturia, urinary incontinence, and hematuria   Integument: negative for rash, skin lesions, bruises.    Hematologic/Lymphatic: negative for easy bruising, bleeding, mmol/L    Anion Gap 14 9 - 17 mmol/L    Alkaline Phosphatase 52 35 - 104 U/L    ALT 11 5 - 33 U/L    AST 16 <32 U/L    Total Bilirubin 0.24 (L) 0.3 - 1.2 mg/dL    Total Protein 7.2 6.4 - 8.3 g/dL    Albumin 3.2 (L) 3.5 - 5.2 g/dL    Albumin/Globulin Ratio 0.8 (L) 1.0 - 2.5    GFR Non- 33 (L) >60 mL/min    GFR African American 40 (L) >60 mL/min    GFR Comment          GFR Staging NOT REPORTED    Magnesium   Result Value Ref Range    Magnesium 1.8 1.6 - 2.6 mg/dL   Protime-INR   Result Value Ref Range    Protime 12.4 (H) 9.1 - 12.3 sec    INR 1.2    Vitamin B12 & Folate   Result Value Ref Range    Vitamin B-12 857 232 - 1245 pg/mL    Folate >20.0 >4.8 ng/mL   Iron and TIBC   Result Value Ref Range    Iron 75 37 - 145 ug/dL    TIBC 215 (L) 250 - 450 ug/dL    Iron Saturation 35 20 - 55 %    UIBC 140 112 - 347 ug/dL   Ferritin   Result Value Ref Range    Ferritin 292 (H) 13 - 150 ug/L   Reticulocytes   Result Value Ref Range    Retic % 1.6 0.5 - 1.9 %    Absolute Retic # 0.050 0.030 - 0.080 M/uL    Immature Retic Fract 12.400 2.7 - 18.3 %    Retic Hemoglobin 30.8 28.2 - 35.7 pg   TSH with Reflex   Result Value Ref Range    TSH 7.01 (H) 0.30 - 5.00 mIU/L   Electrophoresis Protein, Serum without Reflex to Immunofixation   Result Value Ref Range    Total Protein 6.8 6.4 - 8.3 g/dL    Albumin (calculated) 3.3 3.2 - 5.2 g/dL    Albumin % 49 45 - 65 %    Alpha-1-Globulin 0.2 0.1 - 0.4 g/dL    Alpha 1 % 3 3 - 6 %    Alpha-2-Globulin 0.9 0.5 - 0.9 g/dL    Alpha 2 % 13 6 - 13 %    Beta Globulin 2.1 (H) 0.5 - 1.1 g/dL    Beta Percent 31 (H) 11 - 19 %    Gamma Globulin 0.3 (L) 0.5 - 1.5 g/dL    Gamma Globulin % 4 (L) 9 - 20 %    Total Prot. Sum 6.8 6.3 - 8.2 g/dL    Total Prot. Sum,% 100 98 - 102 %    Protein Electrophoresis, Serum       A ZONE OF RESTRICTION IS PRESENT IN THE BETA GLOBULIN REGION. CONFIRMED BY IMMUNOFIXATION TO BE MONOCLONAL    Pathologist ELECTRONICALLY SIGNED.  Willie Bennett M.D. IMMUNOFIXATION SERUM PROFILE   Result Value Ref Range    Serum IFX Interp       A ZONE OF RESTRICTION IS PRESENT IN THE BETA GLOBULIN REGION. CONFIRMED BY IMMUNOFIXATION TO BE MONOCLONAL    Pathologist ELECTRONICALLY SIGNED. Willie Bennett M.D.    KAPPA/LAMBDA QUANT FREE LIGHT CHAINS SERUM   Result Value Ref Range    Kappa Free Light Chains QNT 0.97 0.37 - 1.94 mg/dL    Lambda Free Light Chains .34 (H) 0.57 - 2.63 mg/dL    Free Kappa/Lambda Ratio 0.01 (L) 0.26 - 1.65   IMMUNOGLOBULINS PANEL   Result Value Ref Range    IgG <300 (L) 700 - 1600 mg/dL    IgA 2250 (H) 70 - 400 mg/dL    IgM <25 (L) 40 - 230 mg/dL   T4, Free   Result Value Ref Range    Thyroxine, Free 1.35 0.93 - 1.70 ng/dL   PROTEIN ELECTROPHORESIS, URINE   Result Value Ref Range    Specimen Type . URINE     Urine Total Protein 180 mg/dL    P E Interpretation, U       MONOCLONAL IMMUNOGLOBULIN IS PRESENT AND IDENTIFIED AS    Pathologist ELECTRONICALLY SIGNED.  Willie Bennett M.D.    CBC   Result Value Ref Range    WBC 7.6 3.5 - 11.3 k/uL    RBC 3.08 (L) 3.95 - 5.11 m/uL    Hemoglobin 8.7 (L) 11.9 - 15.1 g/dL    Hematocrit 29.5 (L) 36.3 - 47.1 %    MCV 95.8 82.6 - 102.9 fL    MCH 28.2 25.2 - 33.5 pg    MCHC 29.5 28.4 - 34.8 g/dL    RDW 13.3 11.8 - 14.4 %    Platelets 589 572 - 290 k/uL    MPV 9.4 8.1 - 13.5 fL    NRBC Automated 0.0 0.0 per 100 WBC   Comprehensive Metabolic Panel w/ Reflex to MG   Result Value Ref Range    Glucose 83 70 - 99 mg/dL    BUN 25 (H) 8 - 23 mg/dL    CREATININE 1.61 (H) 0.50 - 0.90 mg/dL    Bun/Cre Ratio NOT REPORTED 9 - 20    Calcium 8.5 (L) 8.6 - 10.4 mg/dL    Sodium 140 135 - 144 mmol/L    Potassium 3.9 3.7 - 5.3 mmol/L    Chloride 107 98 - 107 mmol/L    CO2 23 20 - 31 mmol/L    Anion Gap 10 9 - 17 mmol/L    Alkaline Phosphatase 47 35 - 104 U/L    ALT 9 5 - 33 U/L    AST 15 <32 U/L    Total Bilirubin 0.20 (L) 0.3 - 1.2 mg/dL    Total Protein 6.8 6.4 - 8.3 g/dL    Albumin 3.0 (L) 3.5 - 5.2 g/dL    Albumin/Globulin Ratio 0.8 (L) 1.0 - 2.5    GFR Non- 31 (L) >60 mL/min    GFR  38 (L) >60 mL/min    GFR Comment          GFR Staging NOT REPORTED    BETA 2 MICROGLOBULIN, SERUM   Result Value Ref Range    Beta-2 Microglobulin 6.9 (H) 0.6 - 2.4 mg/L   Protein / Creatinine Ratio, Urine   Result Value Ref Range    Total Protein, Urine 180 mg/dL    Creatinine, Ur 30.5 28.0 - 217.0 mg/dL    Urine Total Protein Creatinine Ratio 5.90 (H) 0.00 - 0.20   URINALYSIS WITH MICROSCOPIC   Result Value Ref Range    Color, UA YELLOW YELLOW    Turbidity UA CLEAR CLEAR    Glucose, Ur NEGATIVE NEGATIVE    Bilirubin Urine NEGATIVE NEGATIVE    Ketones, Urine NEGATIVE NEGATIVE    Specific Gravity, UA 1.010 1.005 - 1.030    Urine Hgb LARGE (A) NEGATIVE    pH, UA 6.0 5.0 - 8.0    Protein, UA 2+ (A) NEGATIVE    Urobilinogen, Urine Normal Normal    Nitrite, Urine NEGATIVE NEGATIVE    Leukocyte Esterase, Urine NEGATIVE NEGATIVE    -          WBC, UA 5 TO 10 0 - 5 /HPF    RBC, UA TOO NUMEROUS TO COUNT 0 - 4 /HPF    Casts UA  0 - 8 /LPF     2 TO 5 HYALINE Reference range defined for non-centrifuged specimen. Crystals, UA NOT REPORTED None /HPF    Epithelial Cells UA 0 TO 2 0 - 5 /HPF    Renal Epithelial, UA NOT REPORTED 0 /HPF    Bacteria, UA NOT REPORTED None    Mucus, UA NOT REPORTED None    Trichomonas, UA NOT REPORTED None    Amorphous, UA NOT REPORTED None    Other Observations UA NOT REPORTED NOT REQ.     Yeast, UA NOT REPORTED None   CBC   Result Value Ref Range    WBC 8.0 3.5 - 11.3 k/uL    RBC 2.86 (L) 3.95 - 5.11 m/uL    Hemoglobin 8.1 (L) 11.9 - 15.1 g/dL    Hematocrit 27.5 (L) 36.3 - 47.1 %    MCV 96.2 82.6 - 102.9 fL    MCH 28.3 25.2 - 33.5 pg    MCHC 29.5 28.4 - 34.8 g/dL    RDW 13.4 11.8 - 14.4 %    Platelets 799 339 - 027 k/uL    MPV 9.7 8.1 - 13.5 fL    NRBC Automated 0.0 0.0 per 100 WBC   Comprehensive Metabolic Panel w/ Reflex to MG   Result Value Ref Range    Glucose 82 70 - 99 mg/dL    BUN 25 (H) 8 - 23 Non- 41 (L) >60 mL/min    GFR African American 50 (L) >60 mL/min    GFR Comment          GFR Staging NOT REPORTED    BASIC METABOLIC PANEL   Result Value Ref Range    Glucose 93 70 - 99 mg/dL    BUN 21 8 - 23 mg/dL    CREATININE 1.28 (H) 0.50 - 0.90 mg/dL    Bun/Cre Ratio NOT REPORTED 9 - 20    Calcium 9.0 8.6 - 10.4 mg/dL    Sodium 139 135 - 144 mmol/L    Potassium 3.8 3.7 - 5.3 mmol/L    Chloride 101 98 - 107 mmol/L    CO2 26 20 - 31 mmol/L    Anion Gap 12 9 - 17 mmol/L    GFR Non-African American 41 (L) >60 mL/min    GFR  49 (L) >60 mL/min    GFR Comment          GFR Staging NOT REPORTED    CBC   Result Value Ref Range    WBC 8.3 3.5 - 11.3 k/uL    RBC 3.07 (L) 3.95 - 5.11 m/uL    Hemoglobin 8.7 (L) 11.9 - 15.1 g/dL    Hematocrit 28.3 (L) 36.3 - 47.1 %    MCV 92.2 82.6 - 102.9 fL    MCH 28.3 25.2 - 33.5 pg    MCHC 30.7 28.4 - 34.8 g/dL    RDW 13.5 11.8 - 14.4 %    Platelets 713 371 - 172 k/uL    MPV 10.2 8.1 - 13.5 fL    NRBC Automated 0.0 0.0 per 100 WBC   BASIC METABOLIC PANEL   Result Value Ref Range    Glucose 86 70 - 99 mg/dL    BUN 28 (H) 8 - 23 mg/dL    CREATININE 1.46 (H) 0.50 - 0.90 mg/dL    Bun/Cre Ratio NOT REPORTED 9 - 20    Calcium 8.9 8.6 - 10.4 mg/dL    Sodium 136 135 - 144 mmol/L    Potassium 4.1 3.7 - 5.3 mmol/L    Chloride 101 98 - 107 mmol/L    CO2 27 20 - 31 mmol/L    Anion Gap 8 (L) 9 - 17 mmol/L    GFR Non-African American 35 (L) >60 mL/min    GFR  42 (L) >60 mL/min    GFR Comment          GFR Staging NOT REPORTED    BASIC METABOLIC PANEL   Result Value Ref Range    Glucose 90 70 - 99 mg/dL    BUN 29 (H) 8 - 23 mg/dL    CREATININE 1.35 (H) 0.50 - 0.90 mg/dL    Bun/Cre Ratio NOT REPORTED 9 - 20    Calcium 9.1 8.6 - 10.4 mg/dL    Sodium 143 135 - 144 mmol/L    Potassium 3.5 (L) 3.7 - 5.3 mmol/L    Chloride 102 98 - 107 mmol/L    CO2 27 20 - 31 mmol/L    Anion Gap 14 9 - 17 mmol/L    GFR Non-African American 38 (L) >60 mL/min    GFR  American 46 (L) >60 mL/min    GFR Comment          GFR Staging NOT REPORTED    Calcium, Ionized   Result Value Ref Range    Calcium, Ion 1.25 1.13 - 1.33 mmol/L   OPEN HEART PANEL   Result Value Ref Range    pH, Arterial 7.374 7.350 - 7.450    pCO2, Arterial 38.5 32 - 45 mmHg    pO2, Arterial 244.0 (H) 75 - 95 mmHg    HCO3, Arterial 21.9 (L) 22 - 27 mmol/L    Positive Base Excess, Art NOT REPORTED 0.0 - 2.0 mmol/L    Negative Base Excess, Art 2.4 (H) 0.0 - 2.0 mmol/L    O2 Sat, Arterial 99.6 94 - 100 %    Total Hb NOT REPORTED 12.0 - 16.0 g/dl    Oxyhemoglobin NOT REPORTED 95.0 - 98.0 %    Carboxyhemoglobin 1.0 0 - 5 %    Methemoglobin NOT REPORTED 0.0 - 1.5 %    Pt Temp 37.0     pH, Art, Temp Adj NOT REPORTED 7.350 - 7.450    pCO2, Art, Temp Adj NOT REPORTED 32 - 45    pO2, Art, Temp Adj NOT REPORTED 75 - 95 mmHg    O2 Device/Flow/% NOT REPORTED     Respiratory Rate NOT REPORTED     Sterling Test INFORMATION NOT PROVIDED     Sample Site NOT REPORTED     Pt.  Position NOT REPORTED     Mode NOT REPORTED     Set Rate NOT REPORTED     Total Rate NOT REPORTED     VT NOT REPORTED     FIO2 57%     Peep/Cpap NOT REPORTED     PSV NOT REPORTED     Text for Respiratory NOT REPORTED     NOTIFICATION NOT REPORTED     NOTIFICATION TIME NOT REPORTED     Hemoglobin 8.7 gm/dL    Hematocrit 27.2 %    POC Glucose 91 65 - 105 mg/dL    Chloride, Whole Blood 106 98 - 110 mmol/L    Potassium, Whole Blood 3.3 (L) 3.6 - 5.0 mmol/L    Sodium, Whole Blood 139 136 - 145 mmol/L   BASIC METABOLIC PANEL   Result Value Ref Range    Glucose 147 (H) 70 - 99 mg/dL    BUN 31 (H) 8 - 23 mg/dL    CREATININE 1.68 (H) 0.50 - 0.90 mg/dL    Bun/Cre Ratio NOT REPORTED 9 - 20    Calcium 8.2 (L) 8.6 - 10.4 mg/dL    Sodium 138 135 - 144 mmol/L    Potassium 4.1 3.7 - 5.3 mmol/L    Chloride 103 98 - 107 mmol/L    CO2 20 20 - 31 mmol/L    Anion Gap 15 9 - 17 mmol/L    GFR Non-African American 30 (L) >60 mL/min    GFR  36 (L) >60 mL/min    GFR Comment GFR Staging NOT REPORTED    CBC   Result Value Ref Range    WBC 13.6 (H) 3.5 - 11.3 k/uL    RBC 2.18 (L) 3.95 - 5.11 m/uL    Hemoglobin 6.3 (LL) 11.9 - 15.1 g/dL    Hematocrit 20.0 (L) 36.3 - 47.1 %    MCV 91.7 82.6 - 102.9 fL    MCH 28.9 25.2 - 33.5 pg    MCHC 31.5 28.4 - 34.8 g/dL    RDW 13.8 11.8 - 14.4 %    Platelets 379 886 - 072 k/uL    MPV 10.0 8.1 - 13.5 fL    NRBC Automated 0.0 0.0 per 100 WBC   POC Glucose Fingerstick   Result Value Ref Range    POC Glucose 82 65 - 105 mg/dL   POC Glucose Fingerstick   Result Value Ref Range    POC Glucose 96 65 - 105 mg/dL   EKG 12 Lead   Result Value Ref Range    Ventricular Rate 103 BPM    Atrial Rate 103 BPM    P-R Interval 144 ms    QRS Duration 76 ms    Q-T Interval 326 ms    QTc Calculation (Bazett) 427 ms    P Axis 51 degrees    R Axis -21 degrees    T Axis 11 degrees   TYPE AND SCREEN   Result Value Ref Range    Expiration Date 05/31/2021,2359     Arm Band Number [id number]     ABO/Rh A POSITIVE     Antibody Screen NEGATIVE    BLOOD BANK SPECIMEN   Result Value Ref Range    Blood Bank Specimen NOT REPORTED    TYPE AND SCREEN   Result Value Ref Range    Expiration Date 06/04/2021,2359     Arm Band Number [id number]     ABO/Rh A POSITIVE     Antibody Screen NEGATIVE    BLOOD BANK SPECIMEN   Result Value Ref Range    Blood Bank Specimen NOT REPORTED          IMAGING DATA:    US RENAL COMPLETE    Result Date: 5/27/2021  EXAMINATION: RETROPERITONEAL ULTRASOUND OF THE KIDNEYS AND URINARY BLADDER 5/27/2021 COMPARISON: None HISTORY: ORDERING SYSTEM PROVIDED HISTORY: ARF TECHNOLOGIST PROVIDED HISTORY: US RETROPERITONEAL COMPLETE ARF FINDINGS: Kidneys: The right kidney measures 10.0 x 4.0 x 3.5 cm and the left kidney measures 10.5 x 3.3 x 5.2 cm. Cortical thickness within normal limits bilaterally. There is diffuse increased renal parenchymal echogenicity bilaterally with increased prominence of the relative hypoechoic renal pyramids.   A tiny 7 x 5 x 6 mm right lower Adequate pain control and operative supportive care  4. Patient will benefit from radiation therapy and systemic chemotherapy. Management plan explained to the patient and family  5. Follow-up outpatient for treatment after recovery from surgery  6. We will do a bone marrow test down the line as outpatient, avoid now due to back complication  7. We will continue to follow while inpatient    Vaishali Cm MD  PGY-3 Internal Medicine Resident  [address] [place] [address]  6/5/2021 12:05 PM                           Attending Physician Statement   I have discussed the care of this patient, including pertinent history and exam findings, with the resident. I have seen and examined the patient and the key elements of all parts of the encounter have been performed by me. I agree with the assessment, plan and orders as documented by the resident and with changes made to the note. Argelia Burnham MD  Hematology/Oncology    Cell: [phone]                                                     This note is created with the assistance of a speech recognition program.  While intending to generate a document that actually reflects the content of the visit, the document can still have some errors including those of syntax and sound a like substitutions which may escape proof reading. It such instances, actual meaning can be extrapolated by contextual diversion.

## 2021-06-05 NOTE — PROGRESS NOTES
[facility]    Office: [address], , Kwame Petty, DO, Hermes Allen DO, Augustus Campbell, DO, Zaheer Long MD, Jose Herrmann MD, Zaheer Bonds MD, Courtney Cardoza MD, Carrie Reynaga MD, Slim Vaughn MD, Lamine Martin MD, Isai Couch MD, Román Lay MD, Guanako Flores DO, Jose Clarke MD, Tom Rivas MD, Regina Woods DO, Eden Mckeon MD,  Ravi Aragon DO, Octaviano nKott MD, Derald Jeans, MD, Birdie Mabry, [facility], [facility], [facility], Jim Garcia CNP, Lemuel Hill, CNS, Brandy Mendoza, CNP, Christian Jose, CNP, An Phan, CNP, Radha Hunt, CNP, Mario Dorman, CNP, Dashawn Arce PA-C, Chandra Berumen DNP, Nathalie Womack, CNP, Renata Handley, CNP, Aaron Magallon, CNP, Antoine Holter, CNP, Devon Barlow, [facility]   [address]    Progress Note    6/5/2021    11:02 AM    Name:   Kalina Chaudhary  MRN:     [id number]     Acct:      [de-identified]   Room:   [facility]4984-75   Day:  9  Admit Date:  5/27/2021  2:18 AM    PCP:   No primary care provider on file. Code Status:  Full Code    Subjective:     C/C:   Back pain    Interval History Status:  POD 2  Bleeding to the bathroom  Pain rated 10/10  Brace is in place    Data Base Updates:  [place]. 18Low m/uL Hemoglobin 6.3Low Panic     Urine culture negative    Postop CT:  Impression:  Postoperative changes with posterior instrumentation from T12-L4. There is dian system bilateral pleural effusions with some atelectasis now   present in the left lower lobe. Brief History:     As documented in the medical record: \"This is a 80-year-old female who initially presented to the hospital with complaints of increasing back pain. Initial MRI did show burst L2 fracture with 6 mm retropulsion of the spinal canal with moderate canal narrowing along with lucency in the right side. Patient was transferred for neurosurgery evaluation.   MRI was concerning for pathologic fracture. IgA level 2250, kappa lambda ratio 0.01 and of work-up pending. Tentative plan for OR this wednesday per Neurosurgery. The plan has included:  1. Pathologic fracture of lumbar Vertebra: Neurosurgery consulted, plan for OR early next week. Continue PT/OT. TLSO brace ordered. Hold ASA,  2. Workup for MM in progress. Kappa/Lambda 0.97/136. Immunoglobulin panel: IGA: 2250. Oncology consulted, need tissue diagnosis. 3. Protein calorie malnutrition: liberalize diet, High calorie supplement. 4. Nephrotic range proteinuria: increase norvasc 10 mg . Add ACE/ARB when AMERICO resolves  5. Monitor Hgb, transfuse prn for symptomatic anemia or Hgb <7  6. Continue Lipitor 20 mg daily. 7. Continue DVT ppx.  8. Continue Synthroid 50 mcg daily. 9. Risk stratification: patient is low/intermediate risk for surgery. \"     Immunofixation urine random profile [[id number]] Collected: 05/29/21 0520 Updated: 06/01/21 0955 Urine IFX Specimen. URINE VolumeNOT REPORTEDmL Urine Total [id number]/dL Urine IFX InterpMONOCLONAL IMMUNOGLOBULIN IS PRESENT AND IDENTIFIED AS   Comment: IGA LAMBDA (1.4 MG/DL). FREE MONOCLONAL LIGHT CHAINS ARE PRESENT, IDENTIFIED AS   LAMBDA (142.2 MG/DL). Results for Costa Ramirez (MRN [id number]) as of 6/1/2021 17:01   Ref. Range 5/27/2021 09:44 5/27/2021 14:12 5/28/2021 05:41 5/28/2021 07:36 5/28/2021 21:12 5/29/2021 06:47 5/30/2021 11:24 5/31/2021 06:21 6/1/2021 06:14   Creatinine Latest Ref Range: 0.50 - 0.90 mg/dL 1.53 (H)  1.61 (H)   1.18 (H) 1.33 (H) 1.27 (H) 1.28 (H)       EKG:  Sinus tachycardia   Cannot rule out Anterior infarct , age undetermined   Abnormal ECG   No previous ECGs available           Echo revealed:  Summary   Global left ventricular systolic function is normal, estimated EF 60-65%   Calculated ejection fraction 62% by Heart Model. No segmental wall motion abnormalities   Grade I (mild) left ventricular diastolic dysfunction.    Mild mitral regurgitation. Mild aortic insufficiency. No significant pericardial effusion is seen. On 6/3 the patient underwent:  Procedure(s):  L2 LAMINECTOMY, LEFT MEDIAL FACETECTOMY, LEFT L1 MEDIAL FACETECTOMY, LEFT L2 FORAMINOTOMY, PARITAL L3 LAMINECTOMY, T12-L4 INSTRUMENTED FUSION  BIOPSY OF T2 TUMOR  Surgeon(s):  Surendra Goodman DO      Medications: Allergies:  No Known Allergies    Current Meds:   Scheduled Meds:    methocarbamol  500 mg Oral Q8H    polyethylene glycol  17 g Oral Daily    amLODIPine  10 mg Oral Daily    docusate sodium  200 mg Oral BID    sennosides-docusate sodium  2 tablet Oral Daily    sodium chloride flush  5-40 mL Intravenous 2 times per day    latanoprost  1 drop Both Eyes Nightly    levothyroxine  50 mcg Oral Daily    atorvastatin  20 mg Oral Daily     Continuous Infusions:    HYDROmorphone 100 mcg/hr (06/04/21 1232)    sodium chloride 50 mL/hr at 06/03/21 2239    sodium chloride       PRN Meds: HYDROmorphone, ondansetron, naloxone, oxyCODONE-acetaminophen, magnesium hydroxide, bisacodyl, metoprolol, sodium chloride flush, sodium chloride, nicotine, acetaminophen **OR** acetaminophen, sodium chloride flush    Data:     Past Medical History:   has a past medical history of Acute renal failure (ARF) ([address] Compression fracture of lumbar vertebra ([address] Essential hypertension, Other hyperlipidemia, and Other specified hypothyroidism. Social History:   reports that she has never smoked. She has never used smokeless tobacco. She reports previous alcohol use. She reports that she does not use drugs. Family History:   Family History   Problem Relation Age of Onset    No Known Problems Mother     No Known Problems Father        Review of Systems:     Review of Systems   Constitutional: Positive for activity change (Decreased). Respiratory: Negative for cough and shortness of breath. Cardiovascular: Negative for chest pain and palpitations.    Gastrointestinal: Positive for constipation. Negative for abdominal pain, nausea and vomiting. Genitourinary: Negative for flank pain and hematuria. Musculoskeletal: Positive for back pain (Pain 10/10 with ambulation) and gait problem. Physical Examination:        Physical Exam  Vitals reviewed. Constitutional:       General: She is not in acute distress. Appearance: She is not diaphoretic. HENT:      Head: Normocephalic. Nose: Nose normal.   Eyes:      General: No scleral icterus. Conjunctiva/sclera: Conjunctivae normal.   Neck:      Trachea: No tracheal deviation. Cardiovascular:      Rate and Rhythm: Normal rate and regular rhythm. Pulmonary:      Effort: Pulmonary effort is normal. No respiratory distress. Breath sounds: Normal breath sounds. No wheezing or rales. Chest:      Chest wall: No tenderness. Abdominal:      General: Bowel sounds are normal. There is no distension. Palpations: Abdomen is soft. Tenderness: There is no abdominal tenderness. Musculoskeletal:         General: No tenderness. Cervical back: Neck supple. Skin:     General: Skin is warm and dry. Coloration: Skin is pale. Comments: Wound is dressed, dry and clean    Neurological:      General: No focal deficit present. Mental Status: She is alert and oriented to person, place, and time. Vitals:  /61   Pulse 96   Temp 98.2 °F (36.8 °C) (Oral)   Resp 18   Ht 5' (1.524 m)   Wt 98 lb 14.4 oz (44.9 kg)   SpO2 95%   BMI 19.32 kg/m²   Temp (24hrs), Av.3 °F (36.8 °C), Min:98 °F (36.7 °C), Max:98.6 °F (37 °C)    Recent Labs     21  1610   POCGLU 91       I/O (24Hr):     Intake/Output Summary (Last 24 hours) at 2021 1102  Last data filed at 2021 0845  Gross per 24 hour   Intake 1179 ml   Output 320 ml   Net 859 ml       Labs:  Hematology:  Recent Labs     21  1610 21  0706   WBC  --  13.6*   RBC  --  2.18*   HGB 8.7 6.3*   HCT 27.2 20.0*   MCV  -- 91.7   MCH  --  28.9   MCHC  --  31.5   RDW  --  13.8   PLT  --  237   MPV  --  10.0     Chemistry:  Recent Labs     06/03/21  0556 06/03/21  1610 06/04/21  0550    139 138   K 3.5* 3.3* 4.1     --  103   CO2 27  --  20   GLUCOSE 90  --  147*   BUN 29*  --  31*   CREATININE 1.35*  --  1.68*   ANIONGAP 14  --  15   LABGLOM 38*  --  30*   GFRAA 46*  --  36*   CALCIUM 9.1  --  8.2*   CAION  --  1.25  --      Recent Labs     06/03/21  1610   POCGLU 91     ABG:  Lab Results   Component Value Date    PHART 7.374 06/03/2021    GIB7XLS 38.5 06/03/2021    PO2ART 244.0 06/03/2021    COI1CQR 21.9 06/03/2021    NBEA 2.4 06/03/2021    PBEA NOT REPORTED 06/03/2021    J2EQUOZA 99.6 06/03/2021    FIO2 57% 06/03/2021     Lab Results   Component Value Date/Time    SPECIAL NOT REPORTED 06/03/2021 06:19 PM     Lab Results   Component Value Date/Time    CULTURE NO GROWTH 06/03/2021 06:19 PM       Radiology:  XR BONE SURVEY COMPLETE    Result Date: 5/28/2021  No definite focal lytic lesions identified on radiographic bone survey. Severe compression fracture at L2 is redemonstrated (previously seen on MRI dated 05/25/2021). Ribs appear mildly heterogeneous. CT LUMBAR SPINE WO CONTRAST    Result Date: 5/30/2021  Redemonstration of a pathologic compression fracture of L2 with extraosseous extension resulting in canal stenosis as well as bilateral L2-3 foraminal narrowing. This is better evaluated on the recent MRI examination. Multilevel degenerative change with degenerative bilateral foraminal narrowing at L5-S1. Small bibasilar effusions with adjacent infiltrates. MRI CERVICAL SPINE W WO CONTRAST    Result Date: 5/27/2021  No evidence of metastatic disease inside the spinal canal. C2 and C3 vertebral body demonstrate fat marrow signal and remainder of the cervical vertebral bodies have diffuse T1 and T2 hypointensity. C2 and C3 may have been exposed to prior radiation.   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 *      Plan:        Monitor H&H  Transfuse as needed  Neurosurgical evaluation and follow-up  Pain management  Blood Pressure - Monitor and control   Lopressor as needed  Hematology evaluation and follow-up as scheduled  Nephrology evaluation follow-up as scheduled  Check bun and creatinine   Avoid nephrotoxins  Nutritional supplementation  Laxatives as needed  DVT prophylaxis  PT/OT  EPCs  Discharge planning, may need placement   Will discharge when arrangements complete and ok with other services.   Follow-up with PCP in one week  Notify PCP of discharge     IP CONSULT TO NEUROSURGERY  IP CONSULT TO HEM/ONC  IP CONSULT TO DIETITIAN  INPATIENT CONSULT TO ORTHOTIST/PROSTHETIST  IP CONSULT TO NEPHROLOGY  IP CONSULT TO IV TEAM    Kate Howe DO  6/5/2021  11:02 AM

## 2021-06-05 NOTE — PROGRESS NOTES
Renal Progress Note    Patient :  Simone Durand; 77 y.o. MRN# [id number]  Location:  2896/9849-22  Attending:  Shauna Alberto DO  Admit Date:  5/27/2021   Hospital Day: 9      Subjective: Following for AMERICO on CKD II-III with baseline 1.1. Admitted with back pain and spinal fracture s/p laminectomy. Has new diagnosis of Multiple Myeloma. Kappa/Lambda 0.97/136. Immunoglobulin panel: IGA: 2250. Oncology consulted, need tissue diagnosis. Has significant pain in back requiring dilaudid in addition to her PCA    Blood pressures stable  No labs this morning. Creatinine yesterday was 1.6. Urine output 250 plus 1 in last 24 hours  On Normal Saline @ 50/hr. Hgb 6.3. Last HgB was 8.7 4 days ago.     Outpatient Medications:     Medications Prior to Admission: naproxen (NAPROSYN) 500 MG tablet, Take 500 mg by mouth 2 times daily (with meals)  latanoprost (XALATAN) 0.005 % ophthalmic solution, Place 1 drop into both eyes nightly  levothyroxine (SYNTHROID) 50 MCG tablet, Take 50 mcg by mouth Daily  lisinopril (PRINIVIL;ZESTRIL) 20 MG tablet, Take 20 mg by mouth daily  simvastatin (ZOCOR) 20 MG tablet, Take 20 mg by mouth nightly  amLODIPine (NORVASC) 5 MG tablet, Take 5 mg by mouth daily  aspirin 81 MG chewable tablet, Take 81 mg by mouth daily    Current Medications:     Scheduled Meds:    methocarbamol  500 mg Oral Q8H    polyethylene glycol  17 g Oral Daily    amLODIPine  10 mg Oral Daily    docusate sodium  200 mg Oral BID    sennosides-docusate sodium  2 tablet Oral Daily    sodium chloride flush  5-40 mL Intravenous 2 times per day    latanoprost  1 drop Both Eyes Nightly    levothyroxine  50 mcg Oral Daily    atorvastatin  20 mg Oral Daily     Continuous Infusions:    HYDROmorphone 100 mcg/hr (06/04/21 1232)    sodium chloride 50 mL/hr at 06/03/21 2239    sodium chloride       PRN Meds:  HYDROmorphone, ondansetron, naloxone, oxyCODONE-acetaminophen, magnesium hydroxide, bisacodyl, metoprolol, sodium chloride flush, sodium chloride, nicotine, acetaminophen **OR** acetaminophen, sodium chloride flush    Input/Output:       I/O last 3 completed shifts: In: 1387 [P.O.:240; I.V.:939]  Out: 350 [Urine:250; Drains:100]. Patient Vitals for the past 96 hrs (Last 3 readings):   Weight   21 0853 98 lb 14.4 oz (44.9 kg)   21 2130 98 lb (44.5 kg)   21 1225 92 lb (41.7 kg)       Vital Signs:   Temperature:  Temp: 98.2 °F (36.8 °C)  TMax:   Temp (24hrs), Av.3 °F (36.8 °C), Min:98 °F (36.7 °C), Max:98.6 °F (37 °C)    Respirations:  Resp: 18  Pulse:   Pulse: 96  BP:    BP: 130/61  BP Range: Systolic (40IMU), MTQ:747 , Min:127 , AAQ:082       Diastolic (01SOX), RVJ:79, Min:57, Max:64      Physical Examination:     General:  Patient was lying flat she was alert and oriented x3  HEENT: Atraumatic  Eyes:   Pulse were reactive to light  Neck:   No JVD or lymphadenopathy. Chest:              Bilateral air entry and clear  Cardiac:  S1 S2 RR, no murmurs, gallops or rubs, JVP not raised. Abdomen: Soft, non-tender, no masses or organomegaly, BS audible. :   No suprapubic or flank tenderness. Neuro:  AAO x 3, No FND. SKIN:  No rashes, good skin turgor.   Extremities:  No edema    Labs:       Recent Labs     21  0706   WBC  --  13.6*   RBC  --  2.18*   HGB 8.7 6.3*   HCT 27.2 20.0*   MCV  --  91.7   MCH  --  28.9   MCHC  --  31.5   RDW  --  13.8   PLT  --  237   MPV  --  10.0      BMP:   Recent Labs     21  0556 21  1610 21  0550    139 138   K 3.5* 3.3* 4.1     --  103   CO2 27  --  20   BUN 29*  --  31*   CREATININE 1.35*  --  1.68*   GLUCOSE 90  --  147*   CALCIUM 9.1  --  8.2*      SPEP:  Lab Results   Component Value Date    PROT 6.6 2021    ALBCAL 3.3 2021    ALBPCT 49 2021    LABALPH 0.2 2021    LABALPH 0.9 2021    A1PCT 3 2021    A2PCT 13 2021    LABBETA 2.1 2021    BETAPCT 31

## 2021-06-05 NOTE — PROGRESS NOTES
Pt up to bathroom with a lot of pain. Pt did not tolerate well. Critical lab of hemoglobin 6.3 reported to Dr. Keyshawn Cherry via perfect serve.

## 2021-06-05 NOTE — FLOWSHEET NOTE
Assessment: Patient was awake and alert when  visited. Family was not present at the time. Patient was in good spirit and seemed to have confidence in the medical staff. When asked how she was feeling, patient responded; \"better. \" Patient said she was raised [place]. Intervention:  maintained listening presence, offered support and prayed with patient. Outcome: Patient expressed appreciation for the spiritual support she received. Follow up visits recommended for more prayers and support. 06/05/21 1521   Encounter Summary   Services provided to: Patient   Support System Family members   Place [address] Visiting   (06/05/2021)   Complexity of Encounter Moderate   Length of Encounter 15 minutes   Spiritual Assessment Completed Yes   Routine   Type Initial   Assessment Calm; Approachable; Hopeful   Intervention Active listening;Nurtured hope;Prayer;[place];Empowerment   Outcome Expressed gratitude   Spiritual/Restorationism   Type Spiritual support

## 2021-06-05 NOTE — PLAN OF CARE
Pt educated on pain management and benefits. Pt using PCA. Pt up to toilet twice. Pt walked out into viera and back to bed and tolerated well. Pt agreeable to suppository to help have BM.

## 2021-06-05 NOTE — PROGRESS NOTES
Neurosurgery MARIBELL/Resident    Daily Progress Note   No chief complaint on file. 6/5/2021  5:12 PM    Chart reviewed. No acute events overnight. No new complaints. Denies headache. Vitals:    06/05/21 1430 06/05/21 1445 06/05/21 1500 06/05/21 1540   BP:    (!) 119/48   Pulse: 94 107 101 112   Resp:       Temp:    99.1 °F (37.3 °C)   TempSrc:    Oral   SpO2: 93% 96% 90% 96%   Weight:       Height:           PE:   AOx3   Motor   L deltoid 5/5; R deltoid 5/5  L biceps 5/5; R biceps 5/5  L triceps 5/5; R triceps 5/5  L intrinsics 5/5; R intrinsics 5/5      L iliopsoas 5/5 , R iliopsoas 5/5  L quadriceps 5/5; R quadriceps 5/5  L Dorsiflexion 5/5; R dorsiflexion 5/5  L Plantarflexion 5/5; R plantarflexion 5/5  L EHL 5/5; R EHL 5/5     Sensation intact     Drain output 100 ml/24h  Incision lumbar dressing dry and intact      Lab Results   Component Value Date    WBC 13.6 (H) 06/05/2021    HGB 6.3 (LL) 06/05/2021    HCT 20.9 (L) 06/05/2021     06/05/2021    ALT 9 05/29/2021    AST 15 05/29/2021     (L) 06/05/2021    K 4.1 06/05/2021     06/05/2021    CREATININE 2.15 (H) 06/05/2021    BUN 45 (H) 06/05/2021    CO2 21 06/05/2021    TSH 7.01 (H) 05/27/2021    INR 1.2 05/27/2021         A/P  77 y.o. female who presents with  L2 pathologic fracture with cord compression   POD#2 s/p L2 and L3 laminectomy and decompression, T12-L4 instrumented fusion     - PCA pump for pain control  - activity as tolerated  - keep HV drain at 1/2 compression, monitor output       Please contact neurosurgery with any changes in patients neurologic status.        Scotty Erm, CNP  6/5/21  5:12 PM

## 2021-06-06 PROBLEM — J98.11 ATELECTASIS: Status: ACTIVE | Noted: 2021-06-06

## 2021-06-06 LAB
ALBUMIN SERPL-MCNC: 2.2 G/DL (ref 3.5–5.2)
ALBUMIN/GLOBULIN RATIO: 0.6 (ref 1–2.5)
ALP BLD-CCNC: 49 U/L (ref 35–104)
ALT SERPL-CCNC: <5 U/L (ref 5–33)
ANION GAP SERPL CALCULATED.3IONS-SCNC: 13 MMOL/L (ref 9–17)
AST SERPL-CCNC: 24 U/L
BILIRUB SERPL-MCNC: 0.32 MG/DL (ref 0.3–1.2)
BUN BLDV-MCNC: 42 MG/DL (ref 8–23)
CALCIUM SERPL-MCNC: 8.3 MG/DL (ref 8.6–10.4)
CHLORIDE BLD-SCNC: 106 MMOL/L (ref 98–107)
CO2: 20 MMOL/L (ref 20–31)
CREAT SERPL-MCNC: 2.12 MG/DL (ref 0.5–0.9)
GFR AFRICAN AMERICAN: 28 ML/MIN
GFR NON-AFRICAN AMERICAN: 23 ML/MIN
GFR SERPL CREATININE-BSD FRML MDRD: ABNORMAL ML/MIN/{1.73_M2}
GFR SERPL CREATININE-BSD FRML MDRD: ABNORMAL ML/MIN/{1.73_M2}
GLUCOSE BLD-MCNC: 89 MG/DL (ref 70–99)
HCT VFR BLD CALC: 27.4 % (ref 36.3–47.1)
HEMOGLOBIN: 8.1 G/DL (ref 11.9–15.1)
MCH RBC QN AUTO: 28.1 PG (ref 25.2–33.5)
MCHC RBC AUTO-ENTMCNC: 29.6 G/DL (ref 28.4–34.8)
MCV RBC AUTO: 95.1 FL (ref 82.6–102.9)
NRBC AUTOMATED: 0 PER 100 WBC
PDW BLD-RTO: 15 % (ref 11.8–14.4)
PHOSPHORUS: 3.1 MG/DL (ref 2.6–4.5)
PLATELET # BLD: 202 K/UL (ref 138–453)
PMV BLD AUTO: 10.2 FL (ref 8.1–13.5)
POTASSIUM SERPL-SCNC: 4.3 MMOL/L (ref 3.7–5.3)
RBC # BLD: 2.88 M/UL (ref 3.95–5.11)
SODIUM BLD-SCNC: 139 MMOL/L (ref 135–144)
TOTAL PROTEIN: 5.8 G/DL (ref 6.4–8.3)
WBC # BLD: 13.3 K/UL (ref 3.5–11.3)

## 2021-06-06 PROCEDURE — 84100 ASSAY OF PHOSPHORUS: CPT

## 2021-06-06 PROCEDURE — 85027 COMPLETE CBC AUTOMATED: CPT

## 2021-06-06 PROCEDURE — 99232 SBSQ HOSP IP/OBS MODERATE 35: CPT | Performed by: INTERNAL MEDICINE

## 2021-06-06 PROCEDURE — 6370000000 HC RX 637 (ALT 250 FOR IP): Performed by: STUDENT IN AN ORGANIZED HEALTH CARE EDUCATION/TRAINING PROGRAM

## 2021-06-06 PROCEDURE — 6360000002 HC RX W HCPCS: Performed by: REGISTERED NURSE

## 2021-06-06 PROCEDURE — 36415 COLL VENOUS BLD VENIPUNCTURE: CPT

## 2021-06-06 PROCEDURE — 6360000002 HC RX W HCPCS: Performed by: INTERNAL MEDICINE

## 2021-06-06 PROCEDURE — 94761 N-INVAS EAR/PLS OXIMETRY MLT: CPT

## 2021-06-06 PROCEDURE — 36430 TRANSFUSION BLD/BLD COMPNT: CPT

## 2021-06-06 PROCEDURE — APPNB30 APP NON BILLABLE TIME 0-30 MINS: Performed by: REGISTERED NURSE

## 2021-06-06 PROCEDURE — 2580000003 HC RX 258: Performed by: STUDENT IN AN ORGANIZED HEALTH CARE EDUCATION/TRAINING PROGRAM

## 2021-06-06 PROCEDURE — 6370000000 HC RX 637 (ALT 250 FOR IP): Performed by: NURSE PRACTITIONER

## 2021-06-06 PROCEDURE — 80053 COMPREHEN METABOLIC PANEL: CPT

## 2021-06-06 PROCEDURE — 2700000000 HC OXYGEN THERAPY PER DAY

## 2021-06-06 PROCEDURE — 2060000000 HC ICU INTERMEDIATE R&B

## 2021-06-06 RX ORDER — LACTULOSE 10 G/15ML
30 SOLUTION ORAL ONCE
Status: COMPLETED | OUTPATIENT
Start: 2021-06-06 | End: 2021-06-06

## 2021-06-06 RX ORDER — BISACODYL 10 MG
10 SUPPOSITORY, RECTAL RECTAL ONCE
Status: DISCONTINUED | OUTPATIENT
Start: 2021-06-06 | End: 2021-06-10 | Stop reason: HOSPADM

## 2021-06-06 RX ORDER — HEPARIN SODIUM 5000 [USP'U]/ML
5000 INJECTION, SOLUTION INTRAVENOUS; SUBCUTANEOUS 2 TIMES DAILY
Status: DISCONTINUED | OUTPATIENT
Start: 2021-06-06 | End: 2021-06-10 | Stop reason: HOSPADM

## 2021-06-06 RX ADMIN — METHOCARBAMOL TABLETS 500 MG: 500 TABLET, COATED ORAL at 05:16

## 2021-06-06 RX ADMIN — LATANOPROST 1 DROP: 50 SOLUTION OPHTHALMIC at 20:24

## 2021-06-06 RX ADMIN — SODIUM CHLORIDE, PRESERVATIVE FREE 10 ML: 5 INJECTION INTRAVENOUS at 08:41

## 2021-06-06 RX ADMIN — OXYCODONE HYDROCHLORIDE AND ACETAMINOPHEN 1 TABLET: 5; 325 TABLET ORAL at 01:25

## 2021-06-06 RX ADMIN — POLYETHYLENE GLYCOL 3350 17 G: 17 POWDER, FOR SOLUTION ORAL at 08:39

## 2021-06-06 RX ADMIN — OXYCODONE HYDROCHLORIDE AND ACETAMINOPHEN 1 TABLET: 5; 325 TABLET ORAL at 13:54

## 2021-06-06 RX ADMIN — DOCUSATE SODIUM 200 MG: 100 CAPSULE ORAL at 08:40

## 2021-06-06 RX ADMIN — HEPARIN SODIUM 5000 UNITS: 5000 INJECTION INTRAVENOUS; SUBCUTANEOUS at 20:24

## 2021-06-06 RX ADMIN — METHOCARBAMOL TABLETS 500 MG: 500 TABLET, COATED ORAL at 13:52

## 2021-06-06 RX ADMIN — LEVOTHYROXINE SODIUM 50 MCG: 50 TABLET ORAL at 05:51

## 2021-06-06 RX ADMIN — ATORVASTATIN CALCIUM 20 MG: 20 TABLET, FILM COATED ORAL at 16:57

## 2021-06-06 RX ADMIN — DOCUSATE SODIUM 50MG AND SENNOSIDES 8.6MG 2 TABLET: 8.6; 5 TABLET, FILM COATED ORAL at 08:40

## 2021-06-06 RX ADMIN — METHOCARBAMOL TABLETS 500 MG: 500 TABLET, COATED ORAL at 21:10

## 2021-06-06 RX ADMIN — HYDROMORPHONE HYDROCHLORIDE 0.5 MG: 1 INJECTION, SOLUTION INTRAMUSCULAR; INTRAVENOUS; SUBCUTANEOUS at 20:19

## 2021-06-06 RX ADMIN — SODIUM CHLORIDE: 9 INJECTION, SOLUTION INTRAVENOUS at 20:24

## 2021-06-06 RX ADMIN — OXYCODONE HYDROCHLORIDE AND ACETAMINOPHEN 1 TABLET: 5; 325 TABLET ORAL at 08:39

## 2021-06-06 RX ADMIN — LACTULOSE 30 G: 20 SOLUTION ORAL at 16:56

## 2021-06-06 RX ADMIN — SODIUM CHLORIDE, PRESERVATIVE FREE 10 ML: 5 INJECTION INTRAVENOUS at 21:10

## 2021-06-06 RX ADMIN — AMLODIPINE BESYLATE 10 MG: 10 TABLET ORAL at 08:40

## 2021-06-06 ASSESSMENT — ENCOUNTER SYMPTOMS
NAUSEA: 0
VOMITING: 0
CONSTIPATION: 1
BACK PAIN: 1
COUGH: 0
SHORTNESS OF BREATH: 0
ABDOMINAL PAIN: 0

## 2021-06-06 ASSESSMENT — PAIN - FUNCTIONAL ASSESSMENT
PAIN_FUNCTIONAL_ASSESSMENT: PREVENTS OR INTERFERES SOME ACTIVE ACTIVITIES AND ADLS
PAIN_FUNCTIONAL_ASSESSMENT: PREVENTS OR INTERFERES SOME ACTIVE ACTIVITIES AND ADLS

## 2021-06-06 ASSESSMENT — PAIN DESCRIPTION - ONSET
ONSET: ON-GOING
ONSET: ON-GOING

## 2021-06-06 ASSESSMENT — PAIN DESCRIPTION - ORIENTATION
ORIENTATION: LOWER
ORIENTATION: LOWER

## 2021-06-06 ASSESSMENT — PAIN DESCRIPTION - PROGRESSION
CLINICAL_PROGRESSION: NOT CHANGED
CLINICAL_PROGRESSION: NOT CHANGED

## 2021-06-06 ASSESSMENT — PAIN DESCRIPTION - PAIN TYPE
TYPE: SURGICAL PAIN;ACUTE PAIN
TYPE: ACUTE PAIN;SURGICAL PAIN

## 2021-06-06 ASSESSMENT — PAIN DESCRIPTION - LOCATION
LOCATION: BACK
LOCATION: BACK

## 2021-06-06 ASSESSMENT — PAIN SCALES - GENERAL
PAINLEVEL_OUTOF10: 0
PAINLEVEL_OUTOF10: 8
PAINLEVEL_OUTOF10: 2
PAINLEVEL_OUTOF10: 3
PAINLEVEL_OUTOF10: 8
PAINLEVEL_OUTOF10: 0
PAINLEVEL_OUTOF10: 7
PAINLEVEL_OUTOF10: 9

## 2021-06-06 ASSESSMENT — PAIN DESCRIPTION - FREQUENCY
FREQUENCY: CONTINUOUS
FREQUENCY: CONTINUOUS

## 2021-06-06 ASSESSMENT — PAIN DESCRIPTION - DESCRIPTORS
DESCRIPTORS: ACHING;SORE
DESCRIPTORS: ACHING;SORE

## 2021-06-06 NOTE — PROGRESS NOTES
Today's Date: 6/6/2021  Patient Name: James Dale  Date of admission: 5/27/2021  2:18 AM  Patient's age: 77 y. o., 1945  Admission Dx: Acute renal failure (ARF) ([facility] [address] [N17.9]    Reason for Consult: management recommendations  Requesting Physician: Mt Vides DO    CHIEF COMPLAINT: Compression fracture of lumbar vertebra    Subjective:  Patient seen and examined bedside  She is postop day 3  She continues to complain of pain. She is afebrile. The family is looking at rehab facilities. Serum IFX Interp 05/27/2021  2:12  [address]   A ZONE OF RESTRICTION IS PRESENT IN THE BETA GLOBULIN REGION.  CONFIRMED BY IMMUNOFIXATION TO BE MONOCLONAL   Comment:   IgA LAMBDA (1.31 G/DL). FREE MONOCLONAL LIGHT CHAINS ARE PRESENT, IDENTIFIED AS      MRI thoracic spine:  Discrete enhancing lesions involving the spinous processes of the vertebral   bodies of T4, T6 and T7 and anterior aspect of vertebral body of T4   concerning for metastatic disease.       Diffuse T1 and T2 hypointensity of the marrow which may be effect of anemia   or chemotherapy.       Only partially included on the acquired images there is a diffuse   infiltrative mass lesion surrounding the anterior and lateral aspect of L1   vertebral body.  Clinical correlation and if appropriate contrast enhanced CT   of abdomen and pelvis is recommended. MRI cervical spine  Anterior aspect of C4 has enhancing lesion.  Finding may be related to   atypical hemangioma or marrow infiltration.       At C5-C6, moderate canal stenosis and severe bilateral neural foraminal   narrowing.  Left foraminal disc protrusion/osteophytic ridging impinges on   the exiting left C6 nerve root         HISTORY OF PRESENT ILLNESS:      The patient is a 77 y.o. female with no significant past medical history, she initially presented to the ER at [facility] facility for worsening lower back pain, was found to have UTI with right ureteral stone and was treated with Bactrim for 7 days. Patient's back pain continued to worsen so follow-up MRI was done which showed burst L2 fracture with a 6 mm retropulsion of spinal canal with moderate canal narrowing. Patient was then transferred to [facility] for neurosurgery evaluation and admission. Currently, patient is awake alert sitting comfortably no acute distress. Vitals and labs reviewed patient admits to decreased appetite and weight loss since January. Neurosurgery has been consulted, will follow up on repeat CT chest abdomen and pelvis and MRI cervical and thoracic spine. Patient underwent L2-3 laminectomy and T12-L4 fusion yesterday 6/3/2021    Past Medical History:   has a past medical history of Acute renal failure (ARF) ([facility]), Compression fracture of lumbar vertebra ([address] Essential hypertension, Other hyperlipidemia, and Other specified hypothyroidism. Past Surgical History:   has a past surgical history that includes Finger trigger release (Left); back surgery (06/03/2021); and lumbar fusion (N/A, 6/3/2021). Medications:    Prior to Admission medications    Medication Sig Start Date End Date Taking?  Authorizing Provider   naproxen (NAPROSYN) 500 MG tablet Take 500 mg by mouth 2 times daily (with meals)   Yes Historical Provider, MD   latanoprost (XALATAN) 0.005 % ophthalmic solution Place 1 drop into both eyes nightly   Yes Historical Provider, MD   levothyroxine (SYNTHROID) 50 MCG tablet Take 50 mcg by mouth Daily   Yes Historical Provider, MD   lisinopril (PRINIVIL;ZESTRIL) 20 MG tablet Take 20 mg by mouth daily   Yes Historical Provider, MD   simvastatin (ZOCOR) 20 MG tablet Take 20 mg by mouth nightly   Yes Historical Provider, MD   amLODIPine (NORVASC) 5 MG tablet Take 5 mg by mouth daily   Yes Historical Provider, MD   aspirin 81 MG chewable tablet Take 81 mg by mouth daily   Yes Historical Provider, MD     Current Facility-Administered Medications   Medication Dose Route Frequency Provider Last Rate Last Admin    bisacodyl (DULCOLAX) suppository 10 mg  10 mg Rectal Once Darío Cadena, APRN - CNP        0.9 % sodium chloride infusion   Intravenous PRN Toshia Thurman MD        HYDROmorphone (DILAUDID) injection 0.5 mg  0.5 mg Intravenous Q4H PRN Christiano Matos MD        ondansetron [facility]) injection 4 mg  4 mg Intravenous Q6H PRN Christiano Matos MD        naloxone [facility]) injection 0.4 mg  0.4 mg Intravenous PRN Christiano Matos MD        HYDROmorphone (DILAUDID) 200 mcg/mL PCA   Intravenous Continuous Christiano Matos MD 0.5 mL/hr at 06/04/21 1232 100 mcg/hr at 06/04/21 1232    methocarbamol (ROBAXIN) tablet 500 mg  500 mg Oral Q8H Christiano Matos MD   500 mg at 06/06/21 0516    oxyCODONE-acetaminophen (PERCOCET) 5-325 MG per tablet 1 tablet  1 tablet Oral Q4H PRN Christiano Matos MD   1 tablet at 06/06/21 0839    0.9 % sodium chloride infusion   Intravenous Continuous Christiano Matos MD 50 mL/hr at 06/05/21 2047 New Bag at 06/05/21 2047    magnesium hydroxide (MILK OF MAGNESIA) 400 MG/5ML suspension 30 mL  30 mL Oral Daily PRN Christiano Matos MD   30 mL at 06/01/21 0249    bisacodyl (DULCOLAX) suppository 10 mg  10 mg Rectal Daily PRN Christiano Matos MD   10 mg at 06/01/21 0417    metoprolol (LOPRESSOR) injection 5 mg  5 mg Intravenous Q6H PRN Christiano Matos MD        polyethylene glycol (GLYCOLAX) packet 17 g  17 g Oral Daily Christiano Matos MD   17 g at 06/06/21 0839    amLODIPine (NORVASC) tablet 10 mg  10 mg Oral Daily Christiano Matos MD   10 mg at 06/06/21 0840    docusate sodium (COLACE) capsule 200 mg  200 mg Oral BID Christiano Matos MD   200 mg at 06/06/21 0840    sennosides-docusate sodium (SENOKOT-S) 8.6-50 MG tablet 2 tablet  2 tablet Oral Daily Christiano Matos MD   2 tablet at 06/06/21 0840    sodium chloride flush 0.9 % injection 5-40 mL  5-40 mL Intravenous 2 times per day Christiano Matos MD 10 mL at 06/06/21 0841    sodium chloride flush 0.9 % injection 5-40 mL  5-40 mL Intravenous PRN Lizy Stearns MD        0.9 % sodium chloride infusion  25 mL Intravenous PRN Lizy Stearns MD        nicotine (NICODERM CQ) 21 MG/24HR 1 patch  1 patch Transdermal Daily PRN Lizy Stearns MD        acetaminophen (TYLENOL) tablet 650 mg  650 mg Oral Q6H PRN Lizy Stearns MD   650 mg at 05/27/21 2147    Or    acetaminophen (TYLENOL) suppository 650 mg  650 mg Rectal Q6H PRN Lizy Stearns MD        latanoprost (XALATAN) 0.005 % ophthalmic solution 1 drop  1 drop Both Eyes Nightly Lizy Stearns MD   1 drop at 06/05/21 2038    levothyroxine (SYNTHROID) tablet 50 mcg  50 mcg Oral Daily Lizy Stearns MD   50 mcg at 06/06/21 0551    atorvastatin (LIPITOR) tablet 20 mg  20 mg Oral Daily Lizy Stearns MD   20 mg at 06/05/21 2037    sodium chloride flush 0.9 % injection 10 mL  10 mL Intravenous PRN Lizy Stearns MD           Allergies:  Patient has no known allergies. Social History:   reports that she has never smoked. She has never used smokeless tobacco. She reports previous alcohol use. She reports that she does not use drugs. Family History: family history includes No Known Problems in her father and mother. REVIEW OF SYSTEMS:      Constitutional: No fever or chills.  No night sweats, no weight loss   Eyes: No eye discharge, double vision, or eye pain   HEENT: negative for sore mouth, sore throat, hoarseness and voice change   Respiratory: negative for cough , sputum, dyspnea, wheezing, hemoptysis, chest pain   Cardiovascular: negative for chest pain, dyspnea, palpitations, orthopnea, PND   Gastrointestinal: negative for nausea, vomiting, diarrhea, constipation, abdominal pain, Dysphagia, hematemesis and hematochezia   Genitourinary: negative for frequency, dysuria, nocturia, urinary incontinence, and hematuria   Integument: negative for rash, skin lesions, bruises. Hematologic/Lymphatic: negative for easy bruising, bleeding, lymphadenopathy, or petechiae   Endocrine: negative for heat or cold intolerance,weight changes, change in bowel habits and hair loss   Musculoskeletal: Back pain present  Neurological: negative for headaches, dizziness, seizures, weakness, numbness    PHYSICAL EXAM:        BP (!) 153/75   Pulse 107   Temp 99 °F (37.2 °C) (Oral)   Resp 16   Ht 5' (1.524 m)   Wt 98 lb 14.4 oz (44.9 kg)   SpO2 96%   BMI 19.32 kg/m²    Temp (24hrs), Av.6 °F (37 °C), Min:98.2 °F (36.8 °C), Max:99.1 °F (37.3 °C)      General appearance - well appearing, no in pain or distress   Mental status - alert and cooperative   Eyes - pupils equal and reactive, extraocular eye movements intact   Mouth - mucous membranes moist  Neck - supple, no significant adenopathy   Lymphatics - no palpable lymphadenopathy, no hepatosplenomegaly   Chest - clear to auscultation, no wheezes, rales or rhonchi, symmetric air entry   Heart - normal rate, regular rhythm, normal S1, S2, no murmurs  Abdomen - soft, nontender, nondistended, no masses or organomegaly   Neurological -alert and oriented. No gross motor or sensory deficit. Musculoskeletal -lumbar spinal tenderness present. Incision healing well  Extremities - peripheral pulses normal, no pedal edema, no clubbing or cyanosis   Skin - normal coloration and turgor, no rashes, no suspicious skin lesions noted ,      DATA:      Labs:     Results for orders placed or performed during the hospital encounter of 21   COVID-19, Rapid    Specimen: Nasopharyngeal Swab   Result Value Ref Range    Specimen Description . NASOPHARYNGEAL SWAB     SARS-CoV-2, Rapid Not Detected Not Detected   Culture, Urine    Specimen: Urine, indwelling catheter   Result Value Ref Range    Specimen Description . INDWELLING CATH URINE INITIAL INSERTION     Special Requests NOT REPORTED     Culture NO GROWTH    Urinalysis with microscopic Result Value Ref Range    Color, UA ORANGE (A) YELLOW    Turbidity UA CLEAR CLEAR    Glucose, Ur NEGATIVE NEGATIVE    Bilirubin Urine NEGATIVE NEGATIVE    Ketones, Urine NEGATIVE NEGATIVE    Specific Gravity, UA 1.014 1.005 - 1.030    Urine Hgb LARGE (A) NEGATIVE    pH, UA 7.0 5.0 - 8.0    Protein, UA 2+ (A) NEGATIVE    Urobilinogen, Urine Normal Normal    Nitrite, Urine NEGATIVE NEGATIVE    Leukocyte Esterase, Urine TRACE (A) NEGATIVE    -          WBC, UA 10 TO 20 0 - 5 /HPF    RBC, UA TOO NUMEROUS TO COUNT 0 - 4 /HPF    Casts UA  0 - 8 /LPF     2 TO 5 HYALINE Reference range defined for non-centrifuged specimen. Crystals, UA NOT REPORTED None /HPF    Epithelial Cells UA 0 TO 2 0 - 5 /HPF    Renal Epithelial, UA NOT REPORTED 0 /HPF    Bacteria, UA NOT REPORTED None    Mucus, UA NOT REPORTED None    Trichomonas, UA NOT REPORTED None    Amorphous, UA NOT REPORTED None    Other Observations UA NOT REPORTED NOT REQ.     Yeast, UA NOT REPORTED None   Sodium, urine, random   Result Value Ref Range    Sodium,Ur 110 mmol/L   Protein, urine, random   Result Value Ref Range    Total Protein, Urine 354 mg/dL   Osmolality, urine   Result Value Ref Range    Osmolality, Ur 421 80 - 1300 mOsm/kg   Creatinine, urine, random   Result Value Ref Range    Creatinine, Ur 54.7 28.0 - 217.0 mg/dL   CBC   Result Value Ref Range    WBC 7.5 3.5 - 11.3 k/uL    RBC 3.29 (L) 3.95 - 5.11 m/uL    Hemoglobin 9.5 (L) 11.9 - 15.1 g/dL    Hematocrit 30.1 (L) 36.3 - 47.1 %    MCV 91.5 82.6 - 102.9 fL    MCH 28.9 25.2 - 33.5 pg    MCHC 31.6 28.4 - 34.8 g/dL    RDW 13.6 11.8 - 14.4 %    Platelets 995 729 - 850 k/uL    MPV 9.8 8.1 - 13.5 fL    NRBC Automated 0.0 0.0 per 100 WBC   Comprehensive Metabolic Panel w/ Reflex to MG   Result Value Ref Range    Glucose 154 (H) 70 - 99 mg/dL    BUN 27 (H) 8 - 23 mg/dL    CREATININE 1.53 (H) 0.50 - 0.90 mg/dL    Bun/Cre Ratio NOT REPORTED 9 - 20    Calcium 9.1 8.6 - 10.4 mg/dL    Sodium 142 135 - 144 mmol/L MONOCLONAL    Pathologist ELECTRONICALLY SIGNED. Deisy Rose M.D. IMMUNOFIXATION SERUM PROFILE   Result Value Ref Range    Serum IFX Interp       A ZONE OF RESTRICTION IS PRESENT IN THE BETA GLOBULIN REGION. CONFIRMED BY IMMUNOFIXATION TO BE MONOCLONAL    Pathologist ELECTRONICALLY SIGNED. Deisy Rose M.D.    KAPPA/LAMBDA QUANT FREE LIGHT CHAINS SERUM   Result Value Ref Range    Kappa Free Light Chains QNT 0.97 0.37 - 1.94 mg/dL    Lambda Free Light Chains .34 (H) 0.57 - 2.63 mg/dL    Free Kappa/Lambda Ratio 0.01 (L) 0.26 - 1.65   IMMUNOGLOBULINS PANEL   Result Value Ref Range    IgG <300 (L) 700 - 1600 mg/dL    IgA 2250 (H) 70 - 400 mg/dL    IgM <25 (L) 40 - 230 mg/dL   T4, Free   Result Value Ref Range    Thyroxine, Free 1.35 0.93 - 1.70 ng/dL   PROTEIN ELECTROPHORESIS, URINE   Result Value Ref Range    Specimen Type . URINE     Urine Total Protein 180 mg/dL    P E Interpretation, U       MONOCLONAL IMMUNOGLOBULIN IS PRESENT AND IDENTIFIED AS    Pathologist ELECTRONICALLY SIGNED.  Deisy Rose M.D.    CBC   Result Value Ref Range    WBC 7.6 3.5 - 11.3 k/uL    RBC 3.08 (L) 3.95 - 5.11 m/uL    Hemoglobin 8.7 (L) 11.9 - 15.1 g/dL    Hematocrit 29.5 (L) 36.3 - 47.1 %    MCV 95.8 82.6 - 102.9 fL    MCH 28.2 25.2 - 33.5 pg    MCHC 29.5 28.4 - 34.8 g/dL    RDW 13.3 11.8 - 14.4 %    Platelets 778 586 - 596 k/uL    MPV 9.4 8.1 - 13.5 fL    NRBC Automated 0.0 0.0 per 100 WBC   Comprehensive Metabolic Panel w/ Reflex to MG   Result Value Ref Range    Glucose 83 70 - 99 mg/dL    BUN 25 (H) 8 - 23 mg/dL    CREATININE 1.61 (H) 0.50 - 0.90 mg/dL    Bun/Cre Ratio NOT REPORTED 9 - 20    Calcium 8.5 (L) 8.6 - 10.4 mg/dL    Sodium 140 135 - 144 mmol/L    Potassium 3.9 3.7 - 5.3 mmol/L    Chloride 107 98 - 107 mmol/L    CO2 23 20 - 31 mmol/L    Anion Gap 10 9 - 17 mmol/L    Alkaline Phosphatase 47 35 - 104 U/L    ALT 9 5 - 33 U/L    AST 15 <32 U/L    Total Bilirubin 0.20 (L) 0.3 - 1.2 mg/dL    Total Protein 6.8 6.4 - 8.3 g/dL    Albumin 3.0 (L) 3.5 - 5.2 g/dL    Albumin/Globulin Ratio 0.8 (L) 1.0 - 2.5    GFR Non- 31 (L) >60 mL/min    GFR  38 (L) >60 mL/min    GFR Comment          GFR Staging NOT REPORTED    BETA 2 MICROGLOBULIN, SERUM   Result Value Ref Range    Beta-2 Microglobulin 6.9 (H) 0.6 - 2.4 mg/L   Protein / Creatinine Ratio, Urine   Result Value Ref Range    Total Protein, Urine 180 mg/dL    Creatinine, Ur 30.5 28.0 - 217.0 mg/dL    Urine Total Protein Creatinine Ratio 5.90 (H) 0.00 - 0.20   URINALYSIS WITH MICROSCOPIC   Result Value Ref Range    Color, UA YELLOW YELLOW    Turbidity UA CLEAR CLEAR    Glucose, Ur NEGATIVE NEGATIVE    Bilirubin Urine NEGATIVE NEGATIVE    Ketones, Urine NEGATIVE NEGATIVE    Specific Gravity, UA 1.010 1.005 - 1.030    Urine Hgb LARGE (A) NEGATIVE    pH, UA 6.0 5.0 - 8.0    Protein, UA 2+ (A) NEGATIVE    Urobilinogen, Urine Normal Normal    Nitrite, Urine NEGATIVE NEGATIVE    Leukocyte Esterase, Urine NEGATIVE NEGATIVE    -          WBC, UA 5 TO 10 0 - 5 /HPF    RBC, UA TOO NUMEROUS TO COUNT 0 - 4 /HPF    Casts UA  0 - 8 /LPF     2 TO 5 HYALINE Reference range defined for non-centrifuged specimen. Crystals, UA NOT REPORTED None /HPF    Epithelial Cells UA 0 TO 2 0 - 5 /HPF    Renal Epithelial, UA NOT REPORTED 0 /HPF    Bacteria, UA NOT REPORTED None    Mucus, UA NOT REPORTED None    Trichomonas, UA NOT REPORTED None    Amorphous, UA NOT REPORTED None    Other Observations UA NOT REPORTED NOT REQ.     Yeast, UA NOT REPORTED None   CBC   Result Value Ref Range    WBC 8.0 3.5 - 11.3 k/uL    RBC 2.86 (L) 3.95 - 5.11 m/uL    Hemoglobin 8.1 (L) 11.9 - 15.1 g/dL    Hematocrit 27.5 (L) 36.3 - 47.1 %    MCV 96.2 82.6 - 102.9 fL    MCH 28.3 25.2 - 33.5 pg    MCHC 29.5 28.4 - 34.8 g/dL    RDW 13.4 11.8 - 14.4 %    Platelets 030 451 - 524 k/uL    MPV 9.7 8.1 - 13.5 fL    NRBC Automated 0.0 0.0 per 100 WBC   Comprehensive Metabolic Panel w/ Reflex to MG   Result Value Ref Range    Glucose 82 70 - 99 mg/dL    BUN 25 (H) 8 - 23 mg/dL    CREATININE 1.18 (H) 0.50 - 0.90 mg/dL    Bun/Cre Ratio NOT REPORTED 9 - 20    Calcium 8.6 8.6 - 10.4 mg/dL    Sodium 136 135 - 144 mmol/L    Potassium 3.7 3.7 - 5.3 mmol/L    Chloride 105 98 - 107 mmol/L    CO2 22 20 - 31 mmol/L    Anion Gap 9 9 - 17 mmol/L    Alkaline Phosphatase 43 35 - 104 U/L    ALT 9 5 - 33 U/L    AST 15 <32 U/L    Total Bilirubin 0.15 (L) 0.3 - 1.2 mg/dL    Total Protein 6.6 6.4 - 8.3 g/dL    Albumin 3.0 (L) 3.5 - 5.2 g/dL    Albumin/Globulin Ratio 0.8 (L) 1.0 - 2.5    GFR Non- 45 (L) >60 mL/min    GFR  54 (L) >60 mL/min    GFR Comment          GFR Staging NOT REPORTED    Protein, urine, random   Result Value Ref Range    Total Protein, Urine 238 mg/dL   Creatinine, Random Urine   Result Value Ref Range    Creatinine, Ur 33.0 28.0 - 217.0 mg/dL   BASIC METABOLIC PANEL   Result Value Ref Range    Glucose 118 (H) 70 - 99 mg/dL    BUN 25 (H) 8 - 23 mg/dL    CREATININE 1.33 (H) 0.50 - 0.90 mg/dL    Bun/Cre Ratio NOT REPORTED 9 - 20    Calcium 8.7 8.6 - 10.4 mg/dL    Sodium 139 135 - 144 mmol/L    Potassium 3.6 (L) 3.7 - 5.3 mmol/L    Chloride 103 98 - 107 mmol/L    CO2 25 20 - 31 mmol/L    Anion Gap 11 9 - 17 mmol/L    GFR Non-African American 39 (L) >60 mL/min    GFR  47 (L) >60 mL/min    GFR Comment          GFR Staging NOT REPORTED    Immunofixation urine random profile   Result Value Ref Range    Urine IFX Specimen . URINE     Volume NOT REPORTED mL    Urine Total Protein 180 mg/dL    Urine IFX Interp       MONOCLONAL IMMUNOGLOBULIN IS PRESENT AND IDENTIFIED AS   BASIC METABOLIC PANEL   Result Value Ref Range    Glucose 98 70 - 99 mg/dL    BUN 24 (H) 8 - 23 mg/dL    CREATININE 1.27 (H) 0.50 - 0.90 mg/dL    Bun/Cre Ratio NOT REPORTED 9 - 20    Calcium 8.5 (L) 8.6 - 10.4 mg/dL    Sodium 139 135 - 144 mmol/L    Potassium 3.5 (L) 3.7 - 5.3 mmol/L    Chloride 105 98 - 107 mmol/L    CO2 21 20 - 31 mmol/L    Anion Gap 13 9 - 17 mmol/L    GFR Non-African American 41 (L) >60 mL/min    GFR African American 50 (L) >60 mL/min    GFR Comment          GFR Staging NOT REPORTED    BASIC METABOLIC PANEL   Result Value Ref Range    Glucose 93 70 - 99 mg/dL    BUN 21 8 - 23 mg/dL    CREATININE 1.28 (H) 0.50 - 0.90 mg/dL    Bun/Cre Ratio NOT REPORTED 9 - 20    Calcium 9.0 8.6 - 10.4 mg/dL    Sodium 139 135 - 144 mmol/L    Potassium 3.8 3.7 - 5.3 mmol/L    Chloride 101 98 - 107 mmol/L    CO2 26 20 - 31 mmol/L    Anion Gap 12 9 - 17 mmol/L    GFR Non-African American 41 (L) >60 mL/min    GFR  49 (L) >60 mL/min    GFR Comment          GFR Staging NOT REPORTED    CBC   Result Value Ref Range    WBC 8.3 3.5 - 11.3 k/uL    RBC 3.07 (L) 3.95 - 5.11 m/uL    Hemoglobin 8.7 (L) 11.9 - 15.1 g/dL    Hematocrit 28.3 (L) 36.3 - 47.1 %    MCV 92.2 82.6 - 102.9 fL    MCH 28.3 25.2 - 33.5 pg    MCHC 30.7 28.4 - 34.8 g/dL    RDW 13.5 11.8 - 14.4 %    Platelets 487 316 - 629 k/uL    MPV 10.2 8.1 - 13.5 fL    NRBC Automated 0.0 0.0 per 100 WBC   BASIC METABOLIC PANEL   Result Value Ref Range    Glucose 86 70 - 99 mg/dL    BUN 28 (H) 8 - 23 mg/dL    CREATININE 1.46 (H) 0.50 - 0.90 mg/dL    Bun/Cre Ratio NOT REPORTED 9 - 20    Calcium 8.9 8.6 - 10.4 mg/dL    Sodium 136 135 - 144 mmol/L    Potassium 4.1 3.7 - 5.3 mmol/L    Chloride 101 98 - 107 mmol/L    CO2 27 20 - 31 mmol/L    Anion Gap 8 (L) 9 - 17 mmol/L    GFR Non-African American 35 (L) >60 mL/min    GFR  42 (L) >60 mL/min    GFR Comment          GFR Staging NOT REPORTED    BASIC METABOLIC PANEL   Result Value Ref Range    Glucose 90 70 - 99 mg/dL    BUN 29 (H) 8 - 23 mg/dL    CREATININE 1.35 (H) 0.50 - 0.90 mg/dL    Bun/Cre Ratio NOT REPORTED 9 - 20    Calcium 9.1 8.6 - 10.4 mg/dL    Sodium 143 135 - 144 mmol/L    Potassium 3.5 (L) 3.7 - 5.3 mmol/L    Chloride 102 98 - 107 mmol/L    CO2 27 20 - 31 mmol/L    Anion Gap 14 9 - 17 mmol/L    GFR Non- 38 (L) >60 mL/min    GFR  46 (L) >60 mL/min    GFR Comment          GFR Staging NOT REPORTED    Calcium, Ionized   Result Value Ref Range    Calcium, Ion 1.25 1.13 - 1.33 mmol/L   OPEN HEART PANEL   Result Value Ref Range    pH, Arterial 7.374 7.350 - 7.450    pCO2, Arterial 38.5 32 - 45 mmHg    pO2, Arterial 244.0 (H) 75 - 95 mmHg    HCO3, Arterial 21.9 (L) 22 - 27 mmol/L    Positive Base Excess, Art NOT REPORTED 0.0 - 2.0 mmol/L    Negative Base Excess, Art 2.4 (H) 0.0 - 2.0 mmol/L    O2 Sat, Arterial 99.6 94 - 100 %    Total Hb NOT REPORTED 12.0 - 16.0 g/dl    Oxyhemoglobin NOT REPORTED 95.0 - 98.0 %    Carboxyhemoglobin 1.0 0 - 5 %    Methemoglobin NOT REPORTED 0.0 - 1.5 %    Pt Temp 37.0     pH, Art, Temp Adj NOT REPORTED 7.350 - 7.450    pCO2, Art, Temp Adj NOT REPORTED 32 - 45    pO2, Art, Temp Adj NOT REPORTED 75 - 95 mmHg    O2 Device/Flow/% NOT REPORTED     Respiratory Rate NOT REPORTED     Sterling Test INFORMATION NOT PROVIDED     Sample Site NOT REPORTED     Pt.  Position NOT REPORTED     Mode NOT REPORTED     Set Rate NOT REPORTED     Total Rate NOT REPORTED     VT NOT REPORTED     FIO2 57%     Peep/Cpap NOT REPORTED     PSV NOT REPORTED     Text for Respiratory NOT REPORTED     NOTIFICATION NOT REPORTED     NOTIFICATION TIME NOT REPORTED     Hemoglobin 8.7 gm/dL    Hematocrit 27.2 %    POC Glucose 91 65 - 105 mg/dL    Chloride, Whole Blood 106 98 - 110 mmol/L    Potassium, Whole Blood 3.3 (L) 3.6 - 5.0 mmol/L    Sodium, Whole Blood 139 136 - 145 mmol/L   BASIC METABOLIC PANEL   Result Value Ref Range    Glucose 147 (H) 70 - 99 mg/dL    BUN 31 (H) 8 - 23 mg/dL    CREATININE 1.68 (H) 0.50 - 0.90 mg/dL    Bun/Cre Ratio NOT REPORTED 9 - 20    Calcium 8.2 (L) 8.6 - 10.4 mg/dL    Sodium 138 135 - 144 mmol/L    Potassium 4.1 3.7 - 5.3 mmol/L    Chloride 103 98 - 107 mmol/L    CO2 20 20 - 31 mmol/L    Anion Gap 15 9 - 17 mmol/L    GFR Non-African American 30 (L) >60 mL/min    GFR  36 (L) >60 mL/min    GFR Comment          GFR Staging NOT REPORTED    CBC   Result Value Ref Range    WBC 13.6 (H) 3.5 - 11.3 k/uL    RBC 2.18 (L) 3.95 - 5.11 m/uL    Hemoglobin 6.3 (LL) 11.9 - 15.1 g/dL    Hematocrit 20.0 (L) 36.3 - 47.1 %    MCV 91.7 82.6 - 102.9 fL    MCH 28.9 25.2 - 33.5 pg    MCHC 31.5 28.4 - 34.8 g/dL    RDW 13.8 11.8 - 14.4 %    Platelets 327 047 - 621 k/uL    MPV 10.0 8.1 - 13.5 fL    NRBC Automated 0.0 0.0 per 100 WBC   Hemoglobin and Hematocrit, Blood   Result Value Ref Range    Hemoglobin 6.3 (LL) 11.9 - 15.1 g/dL    Hematocrit 20.9 (L) 36.3 - 47.1 %   BASIC METABOLIC PANEL   Result Value Ref Range    Glucose 139 (H) 70 - 99 mg/dL    BUN 45 (H) 8 - 23 mg/dL    CREATININE 2.15 (H) 0.50 - 0.90 mg/dL    Bun/Cre Ratio NOT REPORTED 9 - 20    Calcium 8.1 (L) 8.6 - 10.4 mg/dL    Sodium 134 (L) 135 - 144 mmol/L    Potassium 4.1 3.7 - 5.3 mmol/L    Chloride 103 98 - 107 mmol/L    CO2 21 20 - 31 mmol/L    Anion Gap 10 9 - 17 mmol/L    GFR Non-African American 22 (L) >60 mL/min    GFR  27 (L) >60 mL/min    GFR Comment          GFR Staging NOT REPORTED    CBC   Result Value Ref Range    WBC 13.3 (H) 3.5 - 11.3 k/uL    RBC 2.88 (L) 3.95 - 5.11 m/uL    Hemoglobin 8.1 (L) 11.9 - 15.1 g/dL    Hematocrit 27.4 (L) 36.3 - 47.1 %    MCV 95.1 82.6 - 102.9 fL    MCH 28.1 25.2 - 33.5 pg    MCHC 29.6 28.4 - 34.8 g/dL    RDW 15.0 (H) 11.8 - 14.4 %    Platelets 859 520 - 128 k/uL    MPV 10.2 8.1 - 13.5 fL    NRBC Automated 0.0 0.0 per 100 WBC   RENAL + CHEM PROFILE   Result Value Ref Range    Albumin 2.2 (L) 3.5 - 5.2 g/dL    Albumin/Globulin Ratio 0.6 (L) 1.0 - 2.5    Alkaline Phosphatase 49 35 - 104 U/L    ALT <5 (L) 5 - 33 U/L    AST 24 <32 U/L    Total Bilirubin 0.32 0.3 - 1.2 mg/dL    BUN 42 (H) 8 - 23 mg/dL    CREATININE 2.12 (H) 0.50 - 0.90 mg/dL    Calcium 8.3 (L) 8.6 - 10.4 mg/dL    Phosphorus 3.1 2.6 - 4.5 mg/dL    Glucose 89 70 - 99 mg/dL    Total Protein 5.8 (L) 6.4 - 8.3 g/dL    Sodium 139 135 - 144 mmol/L    Potassium 4.3 3.7 - 5.3 mmol/L    Chloride 106 98 - 107 mmol/L    CO2 20 20 - 31 mmol/L    Anion Gap 13 9 - 17 mmol/L    GFR Non-African American 23 (L) >60 mL/min    GFR  28 (L) >60 mL/min    GFR Comment          GFR Staging NOT REPORTED    POC Glucose Fingerstick   Result Value Ref Range    POC Glucose 82 65 - 105 mg/dL   POC Glucose Fingerstick   Result Value Ref Range    POC Glucose 96 65 - 105 mg/dL   EKG 12 Lead   Result Value Ref Range    Ventricular Rate 103 BPM    Atrial Rate 103 BPM    P-R Interval 144 ms    QRS Duration 76 ms    Q-T Interval 326 ms    QTc Calculation (Bazett) 427 ms    P Axis 51 degrees    R Axis -21 degrees    T Axis 11 degrees   TYPE AND SCREEN   Result Value Ref Range    Expiration Date 05/31/2021,2359     Arm Band Number [id number]     ABO/Rh A POSITIVE     Antibody Screen NEGATIVE    BLOOD BANK SPECIMEN   Result Value Ref Range    Blood Bank Specimen NOT REPORTED    TYPE AND SCREEN   Result Value Ref Range    Expiration Date 06/04/2021,2359     Arm Band Number [id number]     ABO/Rh A POSITIVE     Antibody Screen NEGATIVE    BLOOD BANK SPECIMEN   Result Value Ref Range    Blood Bank Specimen NOT REPORTED    TYPE AND SCREEN   Result Value Ref Range    Expiration Date 06/08/2021,2359     Arm Band Number [id number]     ABO/Rh A POSITIVE     Antibody Screen NEGATIVE     Unit Number [id number]     Product Code Leukocyte Reduced Red Cell     Unit Divison 00     Dispense Status TRANSFUSED     Transfusion Status OK TO TRANSFUSE     Crossmatch Result COMPATIBLE     Unit Number [id number]     Product Code Leukocyte Reduced Red Cell     Unit Divison 00     Dispense Status ALLOCATED     Transfusion Status OK TO TRANSFUSE     Crossmatch Result COMPATIBLE    BLOOD BANK SPECIMEN   Result Value Ref Range    Blood Bank Specimen NOT REPORTED          IMAGING DATA:    US RENAL COMPLETE    Result Date: 5/27/2021 EXAMINATION: RETROPERITONEAL ULTRASOUND OF THE KIDNEYS AND URINARY BLADDER 5/27/2021 COMPARISON: None HISTORY: ORDERING SYSTEM PROVIDED HISTORY: ARF TECHNOLOGIST PROVIDED HISTORY: US RETROPERITONEAL COMPLETE ARF FINDINGS: Kidneys: The right kidney measures 10.0 x 4.0 x 3.5 cm and the left kidney measures 10.5 x 3.3 x 5.2 cm. Cortical thickness within normal limits bilaterally. There is diffuse increased renal parenchymal echogenicity bilaterally with increased prominence of the relative hypoechoic renal pyramids. A tiny 7 x 5 x 6 mm right lower pole renal cyst is noted. No other focal renal lesion. No hydronephrosis in either kidney. No sonographic evidence for renal calculi. Bladder: Unremarkable appearance of the bladder. No dilated distal ureters. The patient did not have the urge to void. Bilateral ureteral jets were identified. Diffuse increased renal parenchymal echogenicity bilaterally consistent with medical renal disease. No hydronephrosis in either kidney.          IMPRESSION:   Primary Problem  Multiple myeloma not having achieved remission [facility])    Active Hospital Problems    Diagnosis Date Noted    Acute blood loss as cause of postoperative anemia [D62]     Left ventricular diastolic dysfunction [L57.9] 06/04/2021    Postoperative pain [G89.18]     Hypokalemia [E87.6]     BMI less than 19,adult [Z68.1] 06/01/2021    Spinal cord compression due to malignant neoplasm metastatic to spine ([address] [G95.29, C79.51]     Moderate protein-calorie malnutrition ([address] [E44.0] 05/28/2021    Compression fracture of lumbar vertebra ([address] [S32.000A] 05/27/2021    Essential hypertension [I10] 05/27/2021    Other hyperlipidemia [E78.49] 05/27/2021    Subclinical hypothyroidism [E03.9] 05/27/2021    Pathologic lumbar vertebral fracture [M84.48XA]     Normocytic normochromic anemia [D64.9]     Multiple myeloma not having achieved remission ([address] [C90.00]     Acute renal failure (ARF) ([address] [N17.9] 05/25/2021       1. Pathological fracture of L2 with cord compression. Differentials could be benign fracture, primary bone tumor or metastatic cancer with unknown primary at this point. Waiting pathology results  2. Patient underwent L2-3 laminectomy and T12-L4 fusion yesterday 6/3/2021  3. IgA lambda multiple myeloma. 4. AMERICO  5. 6 mm retropulsion of spinal canal with moderate canal narrowing    RECOMMENDATIONS:    The patient has multiple myeloma with IgA lambda monoclonal gammopathy. Status post decompression of L2 fracture with cord compression. Appreciate neurosurgery input. Patient creatinine remains elevated, she has anemia but no hypercalcemia. Plan is for rehab stent and we will arrange for radiation after recovery from surgery. She will need systemic therapy down the line. We will follow while inpatient and coordinate outpatient follow-up for radiation and systemic therapy. Vergil Halsted Al-Nsour,MD  Hematologist/Medical [address] [place] [address] hematology oncology physicians                                                     This note is created with the assistance of a speech recognition program.  While intending to generate a document that actually reflects the content of the visit, the document can still have some errors including those of syntax and sound a like substitutions which may escape proof reading. It such instances, actual meaning can be extrapolated by contextual diversion.

## 2021-06-06 NOTE — PLAN OF CARE
Problem: Skin Integrity:  Goal: Will show no infection signs and symptoms  Description: Will show no infection signs and symptoms  6/6/2021 0451 by Chay Moy RN  Outcome: Ongoing  6/5/2021 1518 by Lainey Urbina RN  Outcome: Ongoing  Goal: Absence of new skin breakdown  Description: Absence of new skin breakdown  6/6/2021 0451 by Chay Moy RN  Outcome: Ongoing  6/5/2021 1518 by Lainey Urbina RN  Outcome: Ongoing     Problem: Falls - Risk of:  Goal: Will remain free from falls  Description: Will remain free from falls  6/6/2021 0451 by Chay Moy RN  Outcome: Ongoing  6/5/2021 1518 by Lainey Urbina RN  Outcome: Ongoing  Goal: Absence of physical injury  Description: Absence of physical injury  6/6/2021 0451 by Chay Moy RN  Outcome: Ongoing  6/5/2021 1518 by Lainey Urbina RN  Outcome: Ongoing     Problem: Pain:  Goal: Pain level will decrease  Description: Pain level will decrease  6/6/2021 0451 by Chay Moy RN  Outcome: Ongoing  6/5/2021 1518 by Lainey Urbina RN  Outcome: Ongoing  Goal: Control of acute pain  Description: Control of acute pain  6/6/2021 0451 by Chay Moy RN  Outcome: Ongoing  6/5/2021 1518 by Lainey Urbina RN  Outcome: Ongoing  Goal: Control of chronic pain  Description: Control of chronic pain  6/6/2021 0451 by Chay Moy RN  Outcome: Ongoing  6/5/2021 1518 by Lainey Urbina RN  Outcome: Ongoing     Problem: Nutrition  Goal: Optimal nutrition therapy  6/6/2021 0451 by Chay Moy RN  Outcome: Ongoing  6/5/2021 1518 by Lainey Urbina RN  Outcome: Ongoing

## 2021-06-06 NOTE — PROGRESS NOTES
[facility]    Office: [address], , Azra Burt, DO, Munira Pali, DO, Lorrie Lopez Blood, DO, Efren Jennings MD, Vashti Bonner MD, Deidra Pope MD, Kesha Fisher MD, Taty Verdugo MD, Lisette Borjas MD, Deepak Ray MD, Manuel Lincoln MD, Roámn Valladares MD, Joseph Paredes DO, Lindsay Lucero MD, Radha Myers MD, Nancy Dash DO, Della Dixon MD,  Colby Polo DO, Terrence Deleon MD, Lizzie Crigler, MD, Nate Olson, [facility], [facility], CNP, Dalia Smith, CNP, Fernando Bradley, [facility], Jie Coates, CNP, Tiffanie Phoenix, CNP, Samina Gu, CNP, Solomon Herrera, CNP, Ward Duval, CNP, August Mandujano PA-C, Ric Johnson, [facility], Abiodun Em, CNP, Clint Almanza, CNP, Cam Pete, CNP, Tammy Syed, CNP, Soto Martinez, [address]    Progress Note    6/6/2021    4:11 PM    Name:   Willie Borjas  MRN:     [id number]     Acct:      [de-identified]   Room:   [facility]/2078-40   Day:  10  Admit Date:  5/27/2021  2:18 AM    PCP:   No primary care provider on file. Code Status:  Full Code    Subjective:     C/C:   Back pain    Interval History Status:  POD 3  Ambulated to the bathroom  Pain rated 9/10  Brace is in place  Constipated, no BM    Data Base Updates:  T-max 99.1    WBC13.3High k/uL RBC2. 88Low m/uL Hemoglobin8. 1Low   (Had 1 unit of packed red blood cells transfused) I     [id number] mg/dL   CREATININE2. 12High mg/dL   Creatinine has plateaued/improved    Calcium8.3Low      Brief History:     As documented in the medical record: \"This is a 50-year-old female who initially presented to the hospital with complaints of increasing back pain. Initial MRI did show burst L2 fracture with 6 mm retropulsion of the spinal canal with moderate canal narrowing along with lucency in the right side. Patient was transferred for neurosurgery evaluation. MRI was concerning for pathologic fracture.   IgA level 2250, kappa lambda ratio 0.01 and of work-up pending. Tentative plan for OR this wednesday per Neurosurgery. The plan has included:  1. Pathologic fracture of lumbar Vertebra: Neurosurgery consulted, plan for OR early next week. Continue PT/OT. TLSO brace ordered. Hold ASA,  2. Workup for MM in progress. Kappa/Lambda 0.97/136. Immunoglobulin panel: IGA: 2250. Oncology consulted, need tissue diagnosis. 3. Protein calorie malnutrition: liberalize diet, High calorie supplement. 4. Nephrotic range proteinuria: increase norvasc 10 mg . Add ACE/ARB when AMERICO resolves  5. Monitor Hgb, transfuse prn for symptomatic anemia or Hgb <7  6. Continue Lipitor 20 mg daily. 7. Continue DVT ppx.  8. Continue Synthroid 50 mcg daily. 9. Risk stratification: patient is low/intermediate risk for surgery. \"     Immunofixation urine random profile [[id number]] Collected: 05/29/21 0520 Updated: 06/01/21 0955 Urine IFX Specimen. URINE VolumeNOT REPORTEDmL Urine Total Oevdcit188yb/dL Urine IFX InterpMONOCLONAL IMMUNOGLOBULIN IS PRESENT AND IDENTIFIED AS   Comment: IGA LAMBDA (1.4 MG/DL). FREE MONOCLONAL LIGHT CHAINS ARE PRESENT, IDENTIFIED AS   LAMBDA (142.2 MG/DL). Results for Cristiano Connell (MRN [id number]) as of 6/1/2021 17:01   Ref. Range 5/27/2021 09:44 5/27/2021 14:12 5/28/2021 05:41 5/28/2021 07:36 5/28/2021 21:12 5/29/2021 06:47 5/30/2021 11:24 5/31/2021 06:21 6/1/2021 06:14   Creatinine Latest Ref Range: 0.50 - 0.90 mg/dL 1.53 (H)  1.61 (H)   1.18 (H) 1.33 (H) 1.27 (H) 1.28 (H)       EKG:  Sinus tachycardia   Cannot rule out Anterior infarct , age undetermined   Abnormal ECG   No previous ECGs available           Echo revealed:  Summary   Global left ventricular systolic function is normal, estimated EF 60-65%   Calculated ejection fraction 62% by Heart Model. No segmental wall motion abnormalities   Grade I (mild) left ventricular diastolic dysfunction. Mild mitral regurgitation. Mild aortic insufficiency.    No significant pericardial effusion is seen. On 6/3 the patient underwent:  Procedure(s):  L2 LAMINECTOMY, LEFT MEDIAL FACETECTOMY, LEFT L1 MEDIAL FACETECTOMY, LEFT L2 FORAMINOTOMY, PARITAL L3 LAMINECTOMY, T12-L4 INSTRUMENTED FUSION  BIOPSY OF T2 TUMOR  Surgeon(s):  Noel Klein,       Medications: Allergies:  No Known Allergies    Current Meds:   Scheduled Meds:    bisacodyl  10 mg Rectal Once    lactulose  30 g Oral Once    heparin (porcine)  5,000 Units Subcutaneous BID    methocarbamol  500 mg Oral Q8H    polyethylene glycol  17 g Oral Daily    amLODIPine  10 mg Oral Daily    docusate sodium  200 mg Oral BID    sennosides-docusate sodium  2 tablet Oral Daily    sodium chloride flush  5-40 mL Intravenous 2 times per day    latanoprost  1 drop Both Eyes Nightly    levothyroxine  50 mcg Oral Daily    atorvastatin  20 mg Oral Daily     Continuous Infusions:    sodium chloride      sodium chloride 50 mL/hr at 06/05/21 2047    sodium chloride       PRN Meds: HYDROmorphone **OR** HYDROmorphone, sodium chloride, ondansetron, oxyCODONE-acetaminophen, magnesium hydroxide, bisacodyl, metoprolol, sodium chloride flush, sodium chloride, nicotine, acetaminophen **OR** acetaminophen, sodium chloride flush    Data:     Past Medical History:   has a past medical history of Acute renal failure (ARF) ([address] Compression fracture of lumbar vertebra ([address] Essential hypertension, Other hyperlipidemia, and Other specified hypothyroidism. Social History:   reports that she has never smoked. She has never used smokeless tobacco. She reports previous alcohol use. She reports that she does not use drugs. Family History:   Family History   Problem Relation Age of Onset    No Known Problems Mother     No Known Problems Father        Review of Systems:     Review of Systems   Constitutional: Positive for activity change (Still quite diminished). Respiratory: Negative for cough and shortness of breath. Cardiovascular: Negative for chest pain and palpitations. Gastrointestinal: Positive for constipation. Negative for abdominal pain, nausea and vomiting. Genitourinary: Negative for flank pain and hematuria. Musculoskeletal: Positive for back pain (Pain 9/10 with ambulation) and gait problem. Physical Examination:        Physical Exam  Vitals reviewed. Constitutional:       General: She is not in acute distress. Appearance: She is not diaphoretic. HENT:      Head: Normocephalic. Nose: Nose normal.   Eyes:      General: No scleral icterus. Conjunctiva/sclera: Conjunctivae normal.   Neck:      Trachea: No tracheal deviation. Cardiovascular:      Rate and Rhythm: Normal rate and regular rhythm. Pulmonary:      Effort: Pulmonary effort is normal. No respiratory distress. Breath sounds: Normal breath sounds. No wheezing or rales. Chest:      Chest wall: No tenderness. Abdominal:      General: Bowel sounds are normal. There is no distension. Palpations: Abdomen is soft. Tenderness: There is no abdominal tenderness. Musculoskeletal:         General: No tenderness. Cervical back: Neck supple. Skin:     General: Skin is warm and dry. Coloration: Skin is pale. Comments: Wound is dressed, dry and clean    Neurological:      General: No focal deficit present. Mental Status: She is alert and oriented to person, place, and time. Motor: No weakness. Vitals:  BP (!) 153/75   Pulse 107   Temp 99 °F (37.2 °C) (Oral)   Resp 16   Ht 5' (1.524 m)   Wt 98 lb 14.4 oz (44.9 kg)   SpO2 96%   BMI 19.32 kg/m²   Temp (24hrs), Av.5 °F (36.9 °C), Min:98.2 °F (36.8 °C), Max:99 °F (37.2 °C)    No results for input(s): POCGLU in the last 72 hours. I/O (24Hr):     Intake/Output Summary (Last 24 hours) at 2021 1611  Last data filed at 2021 1354  Gross per 24 hour   Intake 1394.67 ml   Output 680 ml   Net 714.67 ml       Labs:  Hematology: Recent Labs     06/05/21  0706 06/05/21  1154 06/06/21  0552   WBC 13.6*  --  13.3*   RBC 2.18*  --  2.88*   HGB 6.3* 6.3* 8.1*   HCT 20.0* 20.9* 27.4*   MCV 91.7  --  95.1   MCH 28.9  --  28.1   MCHC 31.5  --  29.6   RDW 13.8  --  15.0*     --  202   MPV 10.0  --  10.2     Chemistry:  Recent Labs     06/04/21  0550 06/05/21  1154 06/06/21  0552    134* 139   K 4.1 4.1 4.3    103 106   CO2 20 21 20   GLUCOSE 147* 139* 89   BUN 31* 45* 42*   CREATININE 1.68* 2.15* 2.12*   ANIONGAP 15 10 13   LABGLOM 30* 22* 23*   GFRAA 36* 27* 28*   CALCIUM 8.2* 8.1* 8.3*   PHOS  --   --  3.1     Recent Labs     06/06/21  0552   PROT 5.8*   LABALBU 2.2*   AST 24   ALT <5*   ALKPHOS 49   BILITOT 0.32     ABG:  Lab Results   Component Value Date    PHART 7.374 06/03/2021    KDS1NVC 38.5 06/03/2021    PO2ART 244.0 06/03/2021    RWL7OYG 21.9 06/03/2021    NBEA 2.4 06/03/2021    PBEA NOT REPORTED 06/03/2021    V1OMBVQB 99.6 06/03/2021    FIO2 57% 06/03/2021     Lab Results   Component Value Date/Time    SPECIAL NOT REPORTED 06/03/2021 06:19 PM     Lab Results   Component Value Date/Time    CULTURE NO GROWTH 06/03/2021 06:19 PM       Radiology:  XR BONE SURVEY COMPLETE    Result Date: 5/28/2021  No definite focal lytic lesions identified on radiographic bone survey. Severe compression fracture at L2 is redemonstrated (previously seen on MRI dated 05/25/2021). Ribs appear mildly heterogeneous. CT LUMBAR SPINE WO CONTRAST    Result Date: 5/30/2021  Redemonstration of a pathologic compression fracture of L2 with extraosseous extension resulting in canal stenosis as well as bilateral L2-3 foraminal narrowing. This is better evaluated on the recent MRI examination. Multilevel degenerative change with degenerative bilateral foraminal narrowing at L5-S1. Small bibasilar effusions with adjacent infiltrates.      MRI CERVICAL SPINE W WO CONTRAST    Result Date: 5/27/2021  No evidence of metastatic disease inside the spinal canal. C2 and C3 vertebral body demonstrate fat marrow signal and remainder of the cervical vertebral bodies have diffuse T1 and T2 hypointensity. C2 and C3 may have been exposed to prior radiation. The appearance of the remainder of vertebral bodies may be related to anemia or other diffuse marrow abnormalities. Anterior aspect of C4 has enhancing lesion. Finding may be related to atypical hemangioma or marrow infiltration. At C5-C6, moderate canal stenosis and severe bilateral neural foraminal narrowing. Left foraminal disc protrusion/osteophytic ridging impinges on the exiting left C6 nerve root. Other mild to moderate degenerative changes of cervical spine as described. MRI THORACIC SPINE W WO CONTRAST    Addendum Date: 5/27/2021    ADDENDUM: Critical results were called by Dr. Huber Dey MD to Tiff Posada on 5/27/2021 at 19:26. The reported infiltrative mass lesion within the upper lumbar spine may also be related to hematoma . Result Date: 5/27/2021  Discrete enhancing lesions involving the spinous processes of the vertebral bodies of T4, T6 and T7 and anterior aspect of vertebral body of T4 concerning for metastatic disease. Diffuse T1 and T2 hypointensity of the marrow which may be effect of anemia or chemotherapy. Only partially included on the acquired images there is a diffuse infiltrative mass lesion surrounding the anterior and lateral aspect of L1 vertebral body. Clinical correlation and if appropriate contrast enhanced CT of abdomen and pelvis is recommended. The findings were sent to the Radiology Results [address] at 7:10 pm on 5/27/2021to be communicated to a licensed caregiver. US RENAL COMPLETE  Result Date: 5/27/2021  Diffuse increased renal parenchymal echogenicity bilaterally consistent with medical renal disease. No hydronephrosis in either kidney.        Assessment:        Principal Problem:    Multiple myeloma not having achieved remission ([facility]  Active Problems:    Acute renal failure (ARF) (HCC)    Compression fracture of lumbar vertebra (HCC)    Essential hypertension    Other hyperlipidemia    Subclinical hypothyroidism    Pathologic lumbar vertebral fracture    Normocytic normochromic anemia    Moderate protein-calorie malnutrition (HCC)    Spinal cord compression due to malignant neoplasm metastatic to spine (HCC)    BMI less than 19,adult    Hypokalemia    Left ventricular diastolic dysfunction    Postoperative pain    Acute blood loss as cause of postoperative anemia    Atelectasis  Resolved Problems:    * No resolved hospital problems. *      Plan:        Monitor H&H  Transfuse as needed  Neurosurgical eval & f/u as scheduled Dr Royce Pina   Pain management  PCA pump remains in place  Blood Pressure - Monitor and control   Lopressor as needed  Hematology eval & f/u as scheduled Dr Jayna Barrett therapy to be arranged  Nephrology eval & f/u as scheduled   Check bun and creatinine   Gentle hydration  Avoid nephrotoxins  Nutritional supplementation  Laxatives as needed  DVT prophylaxis  Activity as tolerated  Incentive spirometry  PT/OT  EPCs  Lovenox when cleared by neurosurgery  Discharge planning, anticipate placement  Will discharge when arrangements complete and ok with other services.   Follow-up with PCP in one week  Notify PCP of discharge     IP CONSULT TO NEUROSURGERY  IP CONSULT TO HEM/ONC  IP CONSULT TO DIETITIAN  INPATIENT CONSULT TO ORTHOTIST/PROSTHETIST  IP CONSULT TO NEPHROLOGY  IP CONSULT TO IV TEAM  IP CONSULT TO PHYSICAL MEDICINE Clarence Mancera DO  6/6/2021  4:11 PM

## 2021-06-06 NOTE — PROGRESS NOTES
Renal Progress Note    Patient :  Zach Elizabeth; 77 y.o. MRN# [id number]  Location:  [facility]/9359-34  Attending:  Leanne Alexander DO  Admit Date:  5/27/2021   Hospital Day: 10      Subjective: Following for AMERICO on CKD II-III with baseline 1.1. Admitted with back pain and spinal fracture s/p laminectomy. Still has HEMANT drain. Has new diagnosis of Multiple Myeloma. Kappa/Lambda 0.97/136. Immunoglobulin panel: IGA: 2250. Oncology consulted, need tissue diagnosis. Received 1 unit of PRBC yesterday for low HgB of 6.3. Now 8.1  Blood pressures stable  Urine output 200 but most not being measured. On Normal Saline @ 50/hr. Appetite poor. Starting to move around better. Creatinine increased to 2.12 from 1.8. Not on diuretics. C/O constipation.     Outpatient Medications:     Medications Prior to Admission: naproxen (NAPROSYN) 500 MG tablet, Take 500 mg by mouth 2 times daily (with meals)  latanoprost (XALATAN) 0.005 % ophthalmic solution, Place 1 drop into both eyes nightly  levothyroxine (SYNTHROID) 50 MCG tablet, Take 50 mcg by mouth Daily  lisinopril (PRINIVIL;ZESTRIL) 20 MG tablet, Take 20 mg by mouth daily  simvastatin (ZOCOR) 20 MG tablet, Take 20 mg by mouth nightly  amLODIPine (NORVASC) 5 MG tablet, Take 5 mg by mouth daily  aspirin 81 MG chewable tablet, Take 81 mg by mouth daily    Current Medications:     Scheduled Meds:    bisacodyl  10 mg Rectal Once    methocarbamol  500 mg Oral Q8H    polyethylene glycol  17 g Oral Daily    amLODIPine  10 mg Oral Daily    docusate sodium  200 mg Oral BID    sennosides-docusate sodium  2 tablet Oral Daily    sodium chloride flush  5-40 mL Intravenous 2 times per day    latanoprost  1 drop Both Eyes Nightly    levothyroxine  50 mcg Oral Daily    atorvastatin  20 mg Oral Daily     Continuous Infusions:    sodium chloride      HYDROmorphone 100 mcg/hr (06/04/21 1232)    sodium chloride 50 mL/hr at 06/05/21 2047    sodium chloride       PRN Meds: sodium chloride, HYDROmorphone, ondansetron, naloxone, oxyCODONE-acetaminophen, magnesium hydroxide, bisacodyl, metoprolol, sodium chloride flush, sodium chloride, nicotine, acetaminophen **OR** acetaminophen, sodium chloride flush    Input/Output:       I/O last 3 completed shifts: In: 1134.7 [P.O.:280; I.V.:493; Blood:361.7]  Out: 305 [Urine:200; Drains:105]. Patient Vitals for the past 96 hrs (Last 3 readings):   Weight   21 0853 98 lb 14.4 oz (44.9 kg)   21 2130 98 lb (44.5 kg)   21 1225 92 lb (41.7 kg)       Vital Signs:   Temperature:  Temp: 99 °F (37.2 °C)  TMax:   Temp (24hrs), Av.5 °F (36.9 °C), Min:98.2 °F (36.8 °C), Max:99.1 °F (37.3 °C)    Respirations:  Resp: 16  Pulse:   Pulse: 97  BP:    BP: (!) 158/76  BP Range: Systolic (70BZA), BYU:568 , Min:119 , ARJUN:791       Diastolic (61JQS), DVY:04, Min:48, Max:76      Physical Examination:     General:  Patient was lying flat she was alert and oriented x3  HEENT: Atraumatic  Eyes:   Pulse were reactive to light  Neck:   No JVD or lymphadenopathy. Chest:              Bilateral air entry and clear  Cardiac:  S1 S2 RR, no murmurs, gallops or rubs, JVP not raised. Abdomen: Soft, non-tender, no masses or organomegaly, BS audible. :   No suprapubic or flank tenderness. Neuro:  AAO x 3, No FND. SKIN:  No rashes, good skin turgor.   Extremities:  No edema    Labs:       Recent Labs     21  0706 21  1154 21  0552   WBC 13.6*  --  13.3*   RBC 2.18*  --  2.88*   HGB 6.3* 6.3* 8.1*   HCT 20.0* 20.9* 27.4*   MCV 91.7  --  95.1   MCH 28.9  --  28.1   MCHC 31.5  --  29.6   RDW 13.8  --  15.0*     --  202   MPV 10.0  --  10.2      BMP:   Recent Labs     21  0550 21  1154 21  0552    134* 139   K 4.1 4.1 4.3    103 106   CO2 20 21 20   BUN 31* 45* 42*   CREATININE 1.68* 2.15* 2.12*   GLUCOSE 147* 139* 89   CALCIUM 8.2* 8.1* 8.3*      SPEP:  Lab Results   Component Value Date    PROT 5.8 06/06/2021    ALBCAL 3.3 05/27/2021    ALBPCT 49 05/27/2021    LABALPH 0.2 05/27/2021    LABALPH 0.9 05/27/2021    A1PCT 3 05/27/2021    A2PCT 13 05/27/2021    LABBETA 2.1 05/27/2021    BETAPCT 31 05/27/2021    GAMGLOB 0.3 05/27/2021    GGPCT 4 05/27/2021    PATH ELECTRONICALLY SIGNED. Sasha Smith M.D. 05/29/2021     UPEP:     Lab Results   Component Value Date    LABPE  05/29/2021     MONOCLONAL IMMUNOGLOBULIN IS PRESENT AND IDENTIFIED AS       Urinalysis/Chemistries:      Lab Results   Component Value Date    NITRU NEGATIVE 05/29/2021    COLORU YELLOW 05/29/2021    PHUR 6.0 05/29/2021    WBCUA 5 TO 10 05/29/2021    RBCUA TOO NUMEROUS TO COUNT 05/29/2021    MUCUS NOT REPORTED 05/29/2021    TRICHOMONAS NOT REPORTED 05/29/2021    YEAST NOT REPORTED 05/29/2021    BACTERIA NOT REPORTED 05/29/2021    SPECGRAV 1.010 05/29/2021    LEUKOCYTESUR NEGATIVE 05/29/2021    UROBILINOGEN Normal 05/29/2021    BILIRUBINUR NEGATIVE 05/29/2021    GLUCOSEU NEGATIVE 05/29/2021    KETUA NEGATIVE 05/29/2021    AMORPHOUS NOT REPORTED 05/29/2021     Urine Sodium:     Lab Results   Component Value Date     05/27/2021     Urine Osmolarity:   Lab Results   Component Value Date    OSMOU 421 05/27/2021     Urine Creatinine:     Lab Results   Component Value Date    LABCREA 33.0 05/29/2021     Radiology:     CXR: No recent    Assessment:     1. Acute kidney injury and significant increase in creatinine. Differential at this point include hypoxic ATN due to acute drop in hemoglobin versus prerenal azotemia versus cast nephropathy versus myeloma kidney. 2.  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 Nephrotic range proteinuria  4. HTN: stable   5. Myeloma with nephrotic proteinuria . Kappa/Lambda 0.97/136. Immunoglobulin panel: IGA: 2250. Oncology consulted, need tissue diagnosis. Recent diagnosis ? Amyloidosis  6. Hypothyroidism :  7.  Pathologic L2 fracture.  She is s/p L2-3 laminectomy , facetotomy and T12- L4

## 2021-06-06 NOTE — CARE COORDINATION
Transition Planning:  Met with pt to discuss transition planning as pt needs to participate with therapy to support transitions plans. Pt has been limited by pain from review of notes and she confirms. She is agreeable to work with therapy today if available. Reviewed current goals to return home with homecare. CM discusses option to go to SNF for rehab, she agrees this may be a good idea to help her recover. She lives in [place], [place] and requests SNF in [place] but does not remember the name. CM provides list from Medicare. gov to patient to review. Freedom of choice offered. She will discuss with her daughter and provide choice.  CM will attempt to stop back as time permits to obtain choice

## 2021-06-07 ENCOUNTER — APPOINTMENT (OUTPATIENT)
Dept: GENERAL RADIOLOGY | Age: 76
DRG: 456 | End: 2021-06-07
Attending: INTERNAL MEDICINE
Payer: MEDICARE

## 2021-06-07 ENCOUNTER — APPOINTMENT (OUTPATIENT)
Dept: ULTRASOUND IMAGING | Age: 76
DRG: 456 | End: 2021-06-07
Attending: INTERNAL MEDICINE
Payer: MEDICARE

## 2021-06-07 LAB
ALBUMIN SERPL-MCNC: 2.4 G/DL (ref 3.5–5.2)
ALBUMIN/GLOBULIN RATIO: 0.6 (ref 1–2.5)
ALP BLD-CCNC: 58 U/L (ref 35–104)
ALT SERPL-CCNC: <5 U/L (ref 5–33)
ANION GAP SERPL CALCULATED.3IONS-SCNC: 13 MMOL/L (ref 9–17)
AST SERPL-CCNC: 27 U/L
BILIRUB SERPL-MCNC: 0.31 MG/DL (ref 0.3–1.2)
BUN BLDV-MCNC: 35 MG/DL (ref 8–23)
CALCIUM IONIZED: 1.18 MMOL/L (ref 1.13–1.33)
CALCIUM SERPL-MCNC: 8.5 MG/DL (ref 8.6–10.4)
CHLORIDE BLD-SCNC: 108 MMOL/L (ref 98–107)
CO2: 19 MMOL/L (ref 20–31)
CREAT SERPL-MCNC: 2.13 MG/DL (ref 0.5–0.9)
CREATININE URINE: 38.7 MG/DL (ref 28–217)
EOSINOPHIL,URINE: NORMAL
GFR AFRICAN AMERICAN: 27 ML/MIN
GFR NON-AFRICAN AMERICAN: 23 ML/MIN
GFR SERPL CREATININE-BSD FRML MDRD: ABNORMAL ML/MIN/{1.73_M2}
GFR SERPL CREATININE-BSD FRML MDRD: ABNORMAL ML/MIN/{1.73_M2}
GLUCOSE BLD-MCNC: 99 MG/DL (ref 70–99)
HCT VFR BLD CALC: 28.2 % (ref 36.3–47.1)
HEMOGLOBIN: 8.5 G/DL (ref 11.9–15.1)
MCH RBC QN AUTO: 28.3 PG (ref 25.2–33.5)
MCHC RBC AUTO-ENTMCNC: 30.1 G/DL (ref 28.4–34.8)
MCV RBC AUTO: 94 FL (ref 82.6–102.9)
NRBC AUTOMATED: 0 PER 100 WBC
PDW BLD-RTO: 15.1 % (ref 11.8–14.4)
PHOSPHORUS: 3.2 MG/DL (ref 2.6–4.5)
PLATELET # BLD: 254 K/UL (ref 138–453)
PMV BLD AUTO: 9.6 FL (ref 8.1–13.5)
POTASSIUM SERPL-SCNC: 4.3 MMOL/L (ref 3.7–5.3)
RBC # BLD: 3 M/UL (ref 3.95–5.11)
SODIUM BLD-SCNC: 140 MMOL/L (ref 135–144)
SODIUM,UR: 112 MMOL/L
TOTAL PROTEIN: 6.5 G/DL (ref 6.4–8.3)
WBC # BLD: 13.4 K/UL (ref 3.5–11.3)

## 2021-06-07 PROCEDURE — 6360000002 HC RX W HCPCS: Performed by: INTERNAL MEDICINE

## 2021-06-07 PROCEDURE — 6370000000 HC RX 637 (ALT 250 FOR IP): Performed by: STUDENT IN AN ORGANIZED HEALTH CARE EDUCATION/TRAINING PROGRAM

## 2021-06-07 PROCEDURE — 71045 X-RAY EXAM CHEST 1 VIEW: CPT

## 2021-06-07 PROCEDURE — 84300 ASSAY OF URINE SODIUM: CPT

## 2021-06-07 PROCEDURE — APPSS30 APP SPLIT SHARED TIME 16-30 MINUTES: Performed by: REGISTERED NURSE

## 2021-06-07 PROCEDURE — 82570 ASSAY OF URINE CREATININE: CPT

## 2021-06-07 PROCEDURE — 82330 ASSAY OF CALCIUM: CPT

## 2021-06-07 PROCEDURE — 85027 COMPLETE CBC AUTOMATED: CPT

## 2021-06-07 PROCEDURE — 97535 SELF CARE MNGMENT TRAINING: CPT

## 2021-06-07 PROCEDURE — 2060000000 HC ICU INTERMEDIATE R&B

## 2021-06-07 PROCEDURE — 76770 US EXAM ABDO BACK WALL COMP: CPT

## 2021-06-07 PROCEDURE — 97116 GAIT TRAINING THERAPY: CPT

## 2021-06-07 PROCEDURE — 84100 ASSAY OF PHOSPHORUS: CPT

## 2021-06-07 PROCEDURE — 2580000003 HC RX 258: Performed by: STUDENT IN AN ORGANIZED HEALTH CARE EDUCATION/TRAINING PROGRAM

## 2021-06-07 PROCEDURE — 97110 THERAPEUTIC EXERCISES: CPT

## 2021-06-07 PROCEDURE — 36415 COLL VENOUS BLD VENIPUNCTURE: CPT

## 2021-06-07 PROCEDURE — 80053 COMPREHEN METABOLIC PANEL: CPT

## 2021-06-07 PROCEDURE — 99232 SBSQ HOSP IP/OBS MODERATE 35: CPT | Performed by: INTERNAL MEDICINE

## 2021-06-07 PROCEDURE — 6370000000 HC RX 637 (ALT 250 FOR IP): Performed by: REGISTERED NURSE

## 2021-06-07 PROCEDURE — 99221 1ST HOSP IP/OBS SF/LOW 40: CPT | Performed by: PHYSICAL MEDICINE & REHABILITATION

## 2021-06-07 PROCEDURE — 87205 SMEAR GRAM STAIN: CPT

## 2021-06-07 RX ORDER — OXYCODONE HYDROCHLORIDE AND ACETAMINOPHEN 5; 325 MG/1; MG/1
2 TABLET ORAL EVERY 4 HOURS PRN
Status: DISCONTINUED | OUTPATIENT
Start: 2021-06-07 | End: 2021-06-10 | Stop reason: HOSPADM

## 2021-06-07 RX ORDER — METHOCARBAMOL 500 MG/1
500 TABLET, FILM COATED ORAL EVERY 8 HOURS
Qty: 30 TABLET | Refills: 0 | Status: SHIPPED | OUTPATIENT
Start: 2021-06-07 | End: 2021-06-17

## 2021-06-07 RX ORDER — OXYCODONE HYDROCHLORIDE AND ACETAMINOPHEN 5; 325 MG/1; MG/1
1 TABLET ORAL EVERY 4 HOURS PRN
Qty: 20 TABLET | Refills: 0 | Status: SHIPPED | OUTPATIENT
Start: 2021-06-07 | End: 2021-06-07

## 2021-06-07 RX ORDER — AMLODIPINE BESYLATE 10 MG/1
10 TABLET ORAL DAILY
Qty: 30 TABLET | Refills: 3 | Status: SHIPPED
Start: 2021-06-08 | End: 2021-07-06 | Stop reason: SDUPTHER

## 2021-06-07 RX ORDER — OXYCODONE HYDROCHLORIDE AND ACETAMINOPHEN 5; 325 MG/1; MG/1
1 TABLET ORAL EVERY 4 HOURS PRN
Status: DISCONTINUED | OUTPATIENT
Start: 2021-06-07 | End: 2021-06-10 | Stop reason: HOSPADM

## 2021-06-07 RX ORDER — PSEUDOEPHEDRINE HCL 30 MG
200 TABLET ORAL DAILY PRN
Qty: 30 CAPSULE | Refills: 1 | Status: SHIPPED | OUTPATIENT
Start: 2021-06-07 | End: 2021-07-07

## 2021-06-07 RX ORDER — OXYCODONE HYDROCHLORIDE AND ACETAMINOPHEN 5; 325 MG/1; MG/1
1 TABLET ORAL EVERY 4 HOURS PRN
Qty: 20 TABLET | Refills: 0 | Status: SHIPPED | OUTPATIENT
Start: 2021-06-07 | End: 2021-06-14

## 2021-06-07 RX ORDER — SENNA AND DOCUSATE SODIUM 50; 8.6 MG/1; MG/1
2 TABLET, FILM COATED ORAL DAILY
Qty: 60 TABLET | Refills: 1 | Status: SHIPPED | OUTPATIENT
Start: 2021-06-08

## 2021-06-07 RX ORDER — FUROSEMIDE 10 MG/ML
40 INJECTION INTRAMUSCULAR; INTRAVENOUS ONCE
Status: COMPLETED | OUTPATIENT
Start: 2021-06-07 | End: 2021-06-07

## 2021-06-07 RX ADMIN — SODIUM CHLORIDE, PRESERVATIVE FREE 10 ML: 5 INJECTION INTRAVENOUS at 07:57

## 2021-06-07 RX ADMIN — METHOCARBAMOL TABLETS 500 MG: 500 TABLET, COATED ORAL at 21:41

## 2021-06-07 RX ADMIN — LATANOPROST 1 DROP: 50 SOLUTION OPHTHALMIC at 21:41

## 2021-06-07 RX ADMIN — METHOCARBAMOL TABLETS 500 MG: 500 TABLET, COATED ORAL at 05:29

## 2021-06-07 RX ADMIN — OXYCODONE HYDROCHLORIDE AND ACETAMINOPHEN 1 TABLET: 5; 325 TABLET ORAL at 21:40

## 2021-06-07 RX ADMIN — HEPARIN SODIUM 5000 UNITS: 5000 INJECTION INTRAVENOUS; SUBCUTANEOUS at 21:41

## 2021-06-07 RX ADMIN — OXYCODONE HYDROCHLORIDE AND ACETAMINOPHEN 2 TABLET: 5; 325 TABLET ORAL at 16:30

## 2021-06-07 RX ADMIN — ATORVASTATIN CALCIUM 20 MG: 20 TABLET, FILM COATED ORAL at 16:31

## 2021-06-07 RX ADMIN — OXYCODONE HYDROCHLORIDE AND ACETAMINOPHEN 2 TABLET: 5; 325 TABLET ORAL at 10:14

## 2021-06-07 RX ADMIN — OXYCODONE HYDROCHLORIDE AND ACETAMINOPHEN 1 TABLET: 5; 325 TABLET ORAL at 00:58

## 2021-06-07 RX ADMIN — FUROSEMIDE 40 MG: 10 INJECTION, SOLUTION INTRAMUSCULAR; INTRAVENOUS at 13:38

## 2021-06-07 RX ADMIN — AMLODIPINE BESYLATE 10 MG: 10 TABLET ORAL at 07:52

## 2021-06-07 RX ADMIN — LEVOTHYROXINE SODIUM 50 MCG: 50 TABLET ORAL at 05:29

## 2021-06-07 RX ADMIN — OXYCODONE HYDROCHLORIDE AND ACETAMINOPHEN 1 TABLET: 5; 325 TABLET ORAL at 05:29

## 2021-06-07 RX ADMIN — METHOCARBAMOL TABLETS 500 MG: 500 TABLET, COATED ORAL at 13:38

## 2021-06-07 RX ADMIN — HEPARIN SODIUM 5000 UNITS: 5000 INJECTION INTRAVENOUS; SUBCUTANEOUS at 08:04

## 2021-06-07 ASSESSMENT — PAIN DESCRIPTION - LOCATION
LOCATION: BACK

## 2021-06-07 ASSESSMENT — ENCOUNTER SYMPTOMS
VOMITING: 0
CONSTIPATION: 1
NAUSEA: 0
SHORTNESS OF BREATH: 0
COUGH: 0
ABDOMINAL PAIN: 0
BACK PAIN: 1

## 2021-06-07 ASSESSMENT — PAIN DESCRIPTION - ORIENTATION
ORIENTATION: LOWER

## 2021-06-07 ASSESSMENT — PAIN SCALES - GENERAL
PAINLEVEL_OUTOF10: 7
PAINLEVEL_OUTOF10: 4
PAINLEVEL_OUTOF10: 7
PAINLEVEL_OUTOF10: 8
PAINLEVEL_OUTOF10: 8
PAINLEVEL_OUTOF10: 3
PAINLEVEL_OUTOF10: 6

## 2021-06-07 ASSESSMENT — PAIN DESCRIPTION - ONSET
ONSET: ON-GOING
ONSET: ON-GOING

## 2021-06-07 ASSESSMENT — PAIN DESCRIPTION - DESCRIPTORS
DESCRIPTORS: ACHING

## 2021-06-07 ASSESSMENT — PAIN DESCRIPTION - PROGRESSION
CLINICAL_PROGRESSION: NOT CHANGED
CLINICAL_PROGRESSION: NOT CHANGED

## 2021-06-07 ASSESSMENT — PAIN DESCRIPTION - PAIN TYPE
TYPE: SURGICAL PAIN;ACUTE PAIN
TYPE: ACUTE PAIN;SURGICAL PAIN
TYPE: ACUTE PAIN;SURGICAL PAIN

## 2021-06-07 ASSESSMENT — PAIN DESCRIPTION - FREQUENCY
FREQUENCY: CONTINUOUS
FREQUENCY: CONTINUOUS

## 2021-06-07 NOTE — PROGRESS NOTES
Neurosurgery MARIBELL/Resident    Daily Progress Note   No chief complaint on file. 6/7/2021  11:38 AM    Chart reviewed. No acute events overnight. No new complaints. Pain improving, well controlled on oral pain medications. BM yesterday. Ambulating short distances. Vitals:    06/06/21 2311 06/07/21 0425 06/07/21 0548 06/07/21 0738   BP: (!) 148/77 (!) 148/74  (!) 155/75   Pulse: 111 90  94   Resp: 16 18  16   Temp: 98.7 °F (37.1 °C) 98.6 °F (37 °C)  98.6 °F (37 °C)   TempSrc: Oral Oral  Oral   SpO2: 94% 96%  94%   Weight:   107 lb 6.4 oz (48.7 kg)    Height:           PE:   AOx3   Motor   L iliopsoas 5/5 , R iliopsoas 5/5  L quadriceps 5/5; R quadriceps 5/5  L Dorsiflexion 5/5; R dorsiflexion 5/5  L Plantarflexion 5/5; R plantarflexion 5/5  L EHL 5/5; R EHL 5/5     Incision dry, open to air    Lab Results   Component Value Date    WBC 13.4 (H) 06/07/2021    HGB 8.5 (L) 06/07/2021    HCT 28.2 (L) 06/07/2021     06/07/2021    ALT <5 (L) 06/07/2021    AST 27 06/07/2021     06/07/2021    K 4.3 06/07/2021     (H) 06/07/2021    CREATININE 2.13 (H) 06/07/2021    BUN 35 (H) 06/07/2021    CO2 19 (L) 06/07/2021    TSH 7.01 (H) 05/27/2021    INR 1.2 05/27/2021         A/P  77 y.o. female who presents with L2 pathologic fracture with cord compression   POD#4 s/p L2 and L3 laminectomy and decompression, T12-L4 instrumented fusion     - pain control  - activity as tolerated, PT and OT  - follow up 2 weeks post op for incision site check     Please contact neurosurgery with any changes in patients neurologic status.        Mag Han, CNP  6/7/21  11:38 AM

## 2021-06-07 NOTE — PROGRESS NOTES
Occupational Therapy  Facility/Department: [facility] NEURO  Daily Treatment Note  NAME: Esther Orozco  : 1945  MRN: [id number]    Date of Service: 2021    Discharge Recommendations:  Patient would benefit from continued therapy after discharge to increase pt func mobility, ADL status, and high-level IADLs  Assessment   Performance deficits / Impairments: Decreased functional mobility ; Decreased balance;Decreased ADL status; Decreased endurance;Decreased high-level IADLs  Prognosis: Good  Patient Education: OT role, OT POC, purpose of tx, log roll techs, Don/doff TLSO brace, RW use, hand placement during transfers - good return  REQUIRES OT FOLLOW UP: Yes  Activity Tolerance  Activity Tolerance: Patient Tolerated treatment well  Safety Devices  Safety Devices in place: Yes  Type of devices: Gait belt;Left in bed;Call light within reach; Bed alarm in place  Restraints  Initially in place: No     Patient Diagnosis(es): The primary encounter diagnosis was Compression fracture of L2 vertebra, initial encounter ([facility] [address] 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 has a past medical history of Acute renal failure (ARF) (HCC), Compression fracture of lumbar vertebra ([address] Essential hypertension, Other hyperlipidemia, and Other specified hypothyroidism. has a past surgical history that includes Finger trigger release (Left); back surgery (2021); and lumbar fusion (N/A, 6/3/2021).   Restrictions  Restrictions/Precautions  Restrictions/Precautions: Fall Risk  Required Braces or Orthoses?: Yes  Required Braces or Orthoses  Spinal: Thoracic Lumbar Sacral Orthotics  Spinal Other: TLSO while OOB, okay to don while in sitting EOB  Position Activity Restriction  Other position/activity restrictions: posterior corpectomy with fusion lumbar spine 6/3  Subjective   General  Chart Reviewed: Yes  Patient assessed for rehabilitation services?: Yes  Family / Caregiver Present: Yes ( and sister)  Diagnosis: Multiple myeloma not having achieved remission ([facility] [address]  General Comment  Comments: RN ok'd pt for OT tx. Pt pleasant and cooperative throughout. Pain Assessment  Pain Level: 7  Pain Type: Acute pain;Surgical pain  Pain Location: Back  Pain Orientation: Lower  Pain Descriptors: Aching  Non-Pharmaceutical Pain Intervention(s): Ambulation/Increased Activity; Distraction; Therapeutic presence  Vital Signs  Patient Currently in Pain: Yes   Orientation  Orientation  Overall Orientation Status: Within Functional Limits  Objective    ADL  Grooming: Stand by assistance;Setup; Increased time to complete (Pt brushed teeth, washed face standing at sink, SBA for safety, utilizing sink for BUE support)  UE Dressing: Minimal assistance;Setup; Increased time to complete (Don/doff TLSO brace sitting EOB, req Min A for straps)  Toileting: Setup; Increased time to complete; Moderate assistance (Pt completed toileting seated in bathroom, req Mod A for stand to sit transfer. Pt utilized grab bars for transfers)  Additional Comments: Pt completed sit to stand from toilet before writer was ready, pt educated on importance of safety awareness. Pt verbalized good understanding. Pt req increased time to complete ADLs d/t increased pain in back  Balance  Sitting Balance: Stand by assistance (Seated EOB, on toilet)  Standing Balance: Contact guard assistance  Standing Balance  Time: Approx 10 min  Activity: Standing at sink for grooming tasks, func mobility To/From bathroom  Comment: No SOB or LOB noted.  Pt reports increased pain in lower back during standing  Functional Mobility  Functional - Mobility Device: Rolling Walker  Activity: To/from bathroom  Assist Level: Contact guard assistance  Toilet Transfers  Toilet - Technique: Ambulating  Equipment

## 2021-06-07 NOTE — PROGRESS NOTES
Procedure(s):  L2 LAMINECTOMY, LEFT MEDIAL FACETECTOMY, LEFT L1 MEDIAL FACETECTOMY, LEFT L2 FORAMINOTOMY, PARITAL L3 LAMINECTOMY, T12-L4 INSTRUMENTED FUSION  BIOPSY OF T2 TUMOR  Surgeon(s):  Ronel Orellana DO    Postop the patient's hemoglobin dropped to 6.3  One unit of packed red blood cells transfused     The patient's postop course has been as expected  Discharge planning initiated    Medications: Allergies:  No Known Allergies    Current Meds:   Scheduled Meds:    furosemide  40 mg Intravenous Once    bisacodyl  10 mg Rectal Once    heparin (porcine)  5,000 Units Subcutaneous BID    methocarbamol  500 mg Oral Q8H    polyethylene glycol  17 g Oral Daily    amLODIPine  10 mg Oral Daily    docusate sodium  200 mg Oral BID    sennosides-docusate sodium  2 tablet Oral Daily    sodium chloride flush  5-40 mL Intravenous 2 times per day    latanoprost  1 drop Both Eyes Nightly    levothyroxine  50 mcg Oral Daily    atorvastatin  20 mg Oral Daily     Continuous Infusions:    sodium chloride      sodium chloride 50 mL/hr at 06/06/21 2024    sodium chloride       PRN Meds: oxyCODONE-acetaminophen **OR** oxyCODONE-acetaminophen, HYDROmorphone **OR** HYDROmorphone, sodium chloride, ondansetron, magnesium hydroxide, bisacodyl, metoprolol, sodium chloride flush, sodium chloride, nicotine, acetaminophen **OR** acetaminophen, sodium chloride flush    Data:     Past Medical History:   has a past medical history of Acute renal failure (ARF) ([address] Compression fracture of lumbar vertebra ([address] Essential hypertension, Other hyperlipidemia, and Other specified hypothyroidism. Social History:   reports that she has never smoked. She has never used smokeless tobacco. She reports previous alcohol use. She reports that she does not use drugs.      Family History:   Family History   Problem Relation Age of Onset    No Known Problems Mother     No Known Problems Father        Review of Systems:     Review of Systems   Constitutional: Positive for activity change (Improving). Respiratory: Negative for cough and shortness of breath. Cardiovascular: Negative for chest pain and palpitations. Gastrointestinal: Positive for constipation. Negative for abdominal pain, nausea and vomiting. Genitourinary: Negative for flank pain and hematuria. Musculoskeletal: Positive for back pain (Pain 6/10 with ambulation) and gait problem (Still somewhat unsteady). Physical Examination:        Physical Exam  Vitals reviewed. Constitutional:       General: She is not in acute distress. Appearance: She is not diaphoretic. HENT:      Head: Normocephalic. Nose: Nose normal.   Eyes:      General: No scleral icterus. Conjunctiva/sclera: Conjunctivae normal.   Neck:      Trachea: No tracheal deviation. Cardiovascular:      Rate and Rhythm: Normal rate and regular rhythm. Pulmonary:      Effort: Pulmonary effort is normal. No respiratory distress. Breath sounds: Normal breath sounds. No wheezing or rales. Chest:      Chest wall: No tenderness. Abdominal:      General: Bowel sounds are normal. There is no distension. Palpations: Abdomen is soft. Tenderness: There is no abdominal tenderness. Musculoskeletal:         General: No tenderness. Cervical back: Neck supple. Skin:     General: Skin is warm and dry. Coloration: Skin is pale. Comments: Wound is dressed, dry and clean    Neurological:      General: No focal deficit present. Mental Status: She is alert and oriented to person, place, and time. Motor: No weakness. Vitals:  /60   Pulse 103   Temp 98.4 °F (36.9 °C) (Oral)   Resp 22   Ht 5' (1.524 m)   Wt 107 lb 6.4 oz (48.7 kg)   SpO2 100%   BMI 20.98 kg/m²   Temp (24hrs), Av.6 °F (37 °C), Min:98.3 °F (36.8 °C), Max:99 °F (37.2 °C)    No results for input(s): POCGLU in the last 72 hours. I/O (24Hr):     Intake/Output Summary (Last 24 hours) at 6/7/2021 1222  Last data filed at 6/7/2021 0554  Gross per 24 hour   Intake 945 ml   Output 225 ml   Net 720 ml       Labs:  Hematology:  Recent Labs     06/05/21  0706 06/05/21  1154 06/06/21  0552 06/07/21  0535   WBC 13.6*  --  13.3* 13.4*   RBC 2.18*  --  2.88* 3.00*   HGB 6.3* 6.3* 8.1* 8.5*   HCT 20.0* 20.9* 27.4* 28.2*   MCV 91.7  --  95.1 94.0   MCH 28.9  --  28.1 28.3   MCHC 31.5  --  29.6 30.1   RDW 13.8  --  15.0* 15.1*     --  202 254   MPV 10.0  --  10.2 9.6     Chemistry:  Recent Labs     06/05/21  1154 06/06/21  0552 06/07/21  0535   * 139 140   K 4.1 4.3 4.3    106 108*   CO2 21 20 19*   GLUCOSE 139* 89 99   BUN 45* 42* 35*   CREATININE 2.15* 2.12* 2.13*   ANIONGAP 10 13 13   LABGLOM 22* 23* 23*   GFRAA 27* 28* 27*   CALCIUM 8.1* 8.3* 8.5*   CAION  --   --  1.18   PHOS  --  3.1 3.2     Recent Labs     06/06/21  0552 06/07/21  0535   PROT 5.8* 6.5   LABALBU 2.2* 2.4*   AST 24 27   ALT <5* <5*   ALKPHOS 49 58   BILITOT 0.32 0.31     ABG:  Lab Results   Component Value Date    PHART 7.374 06/03/2021    LAS8NAW 38.5 06/03/2021    PO2ART 244.0 06/03/2021    MDJ2EIO 21.9 06/03/2021    NBEA 2.4 06/03/2021    PBEA NOT REPORTED 06/03/2021    J0PJLBOQ 99.6 06/03/2021    FIO2 57% 06/03/2021     Lab Results   Component Value Date/Time    SPECIAL NOT REPORTED 06/03/2021 06:19 PM     Lab Results   Component Value Date/Time    CULTURE NO GROWTH 06/03/2021 06:19 PM       Radiology:  XR BONE SURVEY COMPLETE    Result Date: 5/28/2021  No definite focal lytic lesions identified on radiographic bone survey. Severe compression fracture at L2 is redemonstrated (previously seen on MRI dated 05/25/2021). Ribs appear mildly heterogeneous. CT LUMBAR SPINE WO CONTRAST    Result Date: 5/30/2021  Redemonstration of a pathologic compression fracture of L2 with extraosseous extension resulting in canal stenosis as well as bilateral L2-3 foraminal narrowing.   This is better evaluated on the recent MRI examination. Multilevel degenerative change with degenerative bilateral foraminal narrowing at L5-S1. Small bibasilar effusions with adjacent infiltrates. MRI CERVICAL SPINE W WO CONTRAST    Result Date: 5/27/2021  No evidence of metastatic disease inside the spinal canal. C2 and C3 vertebral body demonstrate fat marrow signal and remainder of the cervical vertebral bodies have diffuse T1 and T2 hypointensity. C2 and C3 may have been exposed to prior radiation. The appearance of the remainder of vertebral bodies may be related to anemia or other diffuse marrow abnormalities. Anterior aspect of C4 has enhancing lesion. Finding may be related to atypical hemangioma or marrow infiltration. At C5-C6, moderate canal stenosis and severe bilateral neural foraminal narrowing. Left foraminal disc protrusion/osteophytic ridging impinges on the exiting left C6 nerve root. Other mild to moderate degenerative changes of cervical spine as described. MRI THORACIC SPINE W WO CONTRAST    Addendum Date: 5/27/2021    ADDENDUM: Critical results were called by Dr. Diann Hope MD to [employer] [facility]. [facility] on 5/27/2021 at 19:26. The reported infiltrative mass lesion within the upper lumbar spine may also be related to hematoma . Result Date: 5/27/2021  Discrete enhancing lesions involving the spinous processes of the vertebral bodies of T4, T6 and T7 and anterior aspect of vertebral body of T4 concerning for metastatic disease. Diffuse T1 and T2 hypointensity of the marrow which may be effect of anemia or chemotherapy. Only partially included on the acquired images there is a diffuse infiltrative mass lesion surrounding the anterior and lateral aspect of L1 vertebral body. Clinical correlation and if appropriate contrast enhanced CT of abdomen and pelvis is recommended. The findings were sent to the Radiology Results [address] at 7:10 pm on 5/27/2021to be communicated to a licensed caregiver.      US RENAL COMPLETE Result Date: 5/27/2021  Diffuse increased renal parenchymal echogenicity bilaterally consistent with medical renal disease. No hydronephrosis in either kidney. Assessment:        Principal Problem:    Multiple myeloma not having achieved remission ([facility] [address]  Active Problems:    Acute renal failure (ARF) (HCC)    Compression fracture of lumbar vertebra (HCC)    Essential hypertension    Other hyperlipidemia    Subclinical hypothyroidism    Pathologic lumbar vertebral fracture    Normocytic normochromic anemia    Moderate protein-calorie malnutrition (HCC)    Spinal cord compression due to malignant neoplasm metastatic to spine (HCC)    BMI less than 19,adult    Hypokalemia    Left ventricular diastolic dysfunction    Postoperative pain    Acute blood loss as cause of postoperative anemia    Atelectasis  Resolved Problems:    * No resolved hospital problems. *      Plan:        Monitor H&H  Transfuse as needed  Neurosurgical eval & f/u as scheduled Dr Viktoriya Anthony   Pain management  PCA pump remains in place  Blood Pressure - Monitor and control   Lopressor as needed  Hematology eval & f/u as scheduled Dr Aly Parmar therapy to be arranged  Nephrology eval & f/u as scheduled   DC'd naproxen  DC'd lisinopril  Check bun and creatinine   Gentle hydration  Avoid nephrotoxins  Nutritional supplementation  Laxatives as needed  DVT prophylaxis  Activity as tolerated  Incentive spirometry  PT/OT  EPCs  Heparin initiated  Discharge planning, anticipate placement  Patient is not sure she is comfortable going home  Will discharge when arrangements complete and ok with other services.   Follow-up with PCP in one week  Notify PCP of discharge   Med rec done  MATTHEW done  DCP 33 min+    IP CONSULT TO NEUROSURGERY  IP CONSULT TO HEM/ONC  IP CONSULT TO DIETITIAN  INPATIENT CONSULT TO ORTHOTIST/PROSTHETIST  IP CONSULT TO NEPHROLOGY  IP CONSULT TO IV TEAM  IP CONSULT TO PHYSICAL MEDICINE Clarence Mancera DO  6/7/2021  12:22 PM

## 2021-06-07 NOTE — PROGRESS NOTES
Physical Therapy  Facility/Department: [facility] NEURO  Daily Treatment Note  NAME: Thompson Del Rosario  : 1945  MRN: [id number]    Date of Service: 2021    Discharge Recommendations:  Patient would benefit from continued therapy after discharge   PT Equipment Recommendations  Equipment Needed: No    Assessment   Body structures, Functions, Activity limitations: Decreased functional mobility ; Decreased ADL status; Decreased ROM; Decreased strength;Decreased endurance;Decreased balance; Increased pain  Assessment: Pt ambulated 100ft with RW and CGA, min A to don/doff TLSO and Min A for bed mobility. Pt is limited due to increased pain with mobility and increased fatigue Pt could benefit from continued PT in order to improve functional mobility and ambulation. Pt currently requires 24 hour care to complete functional tasks. Prognosis: Good  REQUIRES PT FOLLOW UP: Yes  Activity Tolerance  Activity Tolerance: Patient limited by endurance; Patient Tolerated treatment well;Patient limited by pain; Patient limited by fatigue     Patient Diagnosis(es): There were no encounter diagnoses. has a past medical history of Acute renal failure (ARF) ([facility]), Compression fracture of lumbar vertebra ([facility] [address] Essential hypertension, Other hyperlipidemia, and Other specified hypothyroidism. has a past surgical history that includes Finger trigger release (Left); back surgery (2021); and lumbar fusion (N/A, 6/3/2021). Restrictions  Restrictions/Precautions  Restrictions/Precautions: Fall Risk  Required Braces or Orthoses?: Yes  Required Braces or Orthoses  Spinal: Thoracic Lumbar Sacral Orthotics  Spinal Other: TLSO while OOB, okay to don while in sitting EOB  Position Activity Restriction  Other position/activity restrictions: posterior corpectomy with fusion lumbar spine 6/3  Subjective   General  Chart Reviewed: Yes  Response To Previous Treatment: Patient with no complaints from previous session.   Family / Caregiver Short term goal 4: Demo Good- dynamic standing balance  Short term goal 5: Demo log roll technique bed mobility independently    Plan    Plan  Times per week: 5-6x/wk  Current Treatment Recommendations: Strengthening, ROM, Balance Training, Functional Mobility Training, Transfer Training, ADL/Self-care Training, Stair training, Gait Training, Endurance Training, Home Exercise Program, Safety Education & Training, Patient/Caregiver Education & Training, Positioning  Safety Devices  Type of devices:  All fall risk precautions in place, Call light within reach, Gait belt, Patient at risk for falls, Left in bed, Nurse notified (pt left with family)  Restraints  Initially in place: No     Therapy Time   Individual Concurrent Group Co-treatment   Time In 0845         Time Out 0914         Minutes 29         Timed Code Treatment Minutes: [address] [facility], [facility]

## 2021-06-07 NOTE — CONSULTS
Physical Medicine & Rehabilitation  Consult Note      Admitting Physician: Kadeem Brooks DO    Primary Care Provider: No primary care provider on file. Reason for Consult:  Acute Inpatient Rehabilitation    Chief Complaint: Back pain    History of Present Illness:  Referring Provider is requesting an evaluation for appropriate placement upon discharge from acute care. Ms. Larry Vu is a 77 y.o. female who was admitted to [facility] on 5/27/2021 with No chief complaint on file. 71-year-old female with increased back pain, recent UTI and right ureteral stone for treated with Bactrim for 7 days. CT scan 5/12 and follow-up MRI showed L2 burst fracture with 6 mm retropulsion of spinal canal.  There was concern for possible pathologic fracture. Follow-up MRI showed pathologic compression fracture extending to the spinal canal with severe spinal canal stenosis patient transferred [facility]. [facility]    Pathology oncology 6/6pathologic L2 cord compressionawaiting pathology report, underwent L2-3 laminectomy and T12-L4 fusion 6/3, IgA lambda multiple myeloma plan for radiation after recovery from surgery    Internal medicinetransfuse as needed PCA pump in place 66, radiation therapy Lovenox when cleared by neurosurgery    Nephrologyreceived 1 unit packed red blood cells today anemia with hemoglobin 6.3, acute kidney injury possibly second hypoxic ATN acute drop in hemoglobin versus cast nephropathy versus myeloma kidney, nephrotic range proteinuria    NeurosurgeryPCA pump discontinued 6/6, status post L2 and 3 laminectomy and decompression and T12-L4 instrumented fusion.       MRI thoracic spine:  Discrete enhancing lesions involving the spinous processes of the vertebral   bodies of T4, T6 and T7 and anterior aspect of vertebral body of T4   concerning for metastatic disease.       Diffuse T1 and T2 hypointensity of the marrow which may be effect of anemia   or chemotherapy.       Only partially included on the acquired images there is a diffuse   infiltrative mass lesion surrounding the anterior and lateral aspect of L1   vertebral body.  Clinical correlation and if appropriate contrast enhanced CT   of abdomen and pelvis is recommended.      MRI cervical spine  Anterior aspect of C4 has enhancing lesion. Finding may be related to   atypical hemangioma or marrow infiltration.       At C5-C6, moderate canal stenosis and severe bilateral neural foraminal   narrowing.  Left foraminal disc protrusion/osteophytic ridging impinges on   the exiting left C6 nerve root            Review of Systems:  Constitutional: negative for anorexia, chills, fatigue, fevers, sweats and weight loss  Eyes: negative for redness and visual disturbance  Ears, nose, mouth, throat, and face: negative for earaches, sore throat and tinnitus  Respiratory: negative for cough and shortness of breath  Cardiovascular: negative for chest pain, dyspnea and palpitations  Gastrointestinal: negative for abdominal pain, change in bowel habits, constipation, nausea and vomiting  Genitourinary:negative for dysuria, frequency, hesitancy and urinary incontinence  Integument/breast: negative for pruritus and rash  Musculoskeletal:negative for muscle weakness and stiff joints  Neurological: negative for dizziness, headaches and weakness  Behavioral/Psych: negative for decreased appetite, depression and fatigue    Functional History:  PTA: Independent with all activities. Current:  PT:  Restrictions/Precautions: Fall Risk  Other position/activity restrictions: posterior corpectomy with fusion lumbar spine 6/3  Required Braces or Orthoses  Spinal: Thoracic Lumbar Sacral Orthotics  Spinal Other: TLSO while OOB, okay to don while in sitting EOB   Transfers  Sit to Stand: Stand by assistance  Stand to sit: Stand by assistance  Comment: completed 2 times with stable heart rate.  Pt denied headache throughout  Ambulation 1  Surface: level tile Device: Rolling Walker  Assistance: Contact guard assistance  Gait Deviations: Slow Jessica, Decreased step length, Decreased step height  Distance: 100 ft  Comments: limited by fatigue and increased pain. OT: 6/4  ADL  Feeding: Independent;Setup  Grooming: Independent;Setup  UE Bathing: Stand by assistance;Setup; Increased time to complete  LE Bathing: Setup; Increased time to complete; Moderate assistance  UE Dressing: Minimal assistance;Setup; Increased time to complete (Pt donned/doffed TLSO brace with Min A to buckle and tighten)  LE Dressing: Setup; Increased time to complete; Moderate assistance (Pt educated on figure 4 tech, attempt made w/ increased pain, required Max A to don socks)  Toileting: Setup; Increased time to complete; Moderate assistance  Tone RUE  RUE Tone: Normotonic  Tone LUE  LUE Tone: Normotonic    ST:      Past Medical History:        Diagnosis Date    Acute renal failure (ARF) ([facility] [address] 5/25/2021    Compression fracture of lumbar vertebra ([facility] [address] 5/27/2021    Essential hypertension 5/27/2021    Other hyperlipidemia 5/27/2021    Other specified hypothyroidism 5/27/2021       Past Surgical History:        Procedure Laterality Date    BACK SURGERY  06/03/2021    Procedure: L2 LAMINECTOMY, MEDIAL FASETECTOMY, LEFT L1 INFERIOR MEDIAL FASETECTOMY, LEFT L2 FORAMINOTOMY, PARITAL L3 LAMINECTOMY, FUSION T12-L4 FUSION     FINGER TRIGGER RELEASE Left     LUMBAR FUSION N/A 6/3/2021    L2 LAMINECTOMY, MEDIAL FASETECTOMY, LEFT L1 INFERIOR MEDIAL FASETECTOMY, LEFT L2 FORAMINOTOMY, PARITAL L3 LAMINECTOMY, FUSION T12-L4 FUSION performed by Eve Joyce DO at [facility]:    No Known Allergies     Current Medications:   Current Facility-Administered Medications: oxyCODONE-acetaminophen (PERCOCET) 5-325 MG per tablet 1 tablet, 1 tablet, Oral, Q4H PRN **OR** oxyCODONE-acetaminophen (PERCOCET) 5-325 MG per tablet 2 tablet, 2 tablet, Oral, Q4H PRN  furosemide (LASIX) injection 40 mg, 40 mg, Not on file   Occupational History    Not on file   Tobacco Use    Smoking status: Never Smoker    Smokeless tobacco: Never Used   Substance and Sexual Activity    Alcohol use: Not Currently    Drug use: Never    Sexual activity: Not on file   Other Topics Concern    Not on file   Social History Narrative    Not on file     Social Determinants of Health     Financial Resource Strain:     Difficulty of Paying Living Expenses:    Food Insecurity:     Worried About Running Out of Food in the Last Year:     [address] in the Last Year:    Transportation Needs:     Lack of Transportation (Medical):      Lack of Transportation (Non-Medical):    Physical Activity:     Days of Exercise per Week:     Minutes of Exercise per Session:    Stress:     Feeling of Stress :    Social Connections:     Frequency of Communication with Friends and Family:     Frequency of Social Gatherings with Friends and Family:     Attends [employer] Services:     Active Member of Clubs or Organizations:     Attends Club or Organization Meetings:     Marital Status:    Intimate Partner Violence:     Fear of Current or Ex-Partner:     Emotionally Abused:     Physically Abused:     Sexually Abused:    Social/Functional History  Lives With: Spouse  Type of Home: House  Home Layout: One level  Home Access: Stairs to enter with rails  Entrance Stairs - Number of Steps: 4  Entrance Stairs - Rails: Left  Bathroom Shower/Tub: Walk-in shower, Shower chair without back  [employer]: Standard (has a raised toilet seat in 2nd bathroom)  Bathroom Equipment: Grab bars in [address]: Rolling walker, 4 wheeled walker, [employer], Reacher  ADL Assistance: Independent  Homemaking Assistance: Independent  Homemaking Responsibilities: Yes  Meal Prep Responsibility: Primary  Laundry Responsibility: Primary  Cleaning Responsibility: Primary  Shopping Responsibility: Primary  Ambulation Assistance: Independent  Transfer remission ([facility] [address]    1. Low back pain pathologic L2 cord compression status post L2-L3 laminectomy and decompression T12-L4 6/3TLSO  2. PainIV Dilaudid, Dc'd yest) Percocet, Tylenol, Robaxin  3. Anemia  4. Possible multiple myelomaplan for radiation after recovery from surgeryhematology oncology awaiting biopsy results  5. Acute kidney injury  6. Recent history of UTI and right ureteral stone  7. Hypertension/hyperlipidemiaNorvasc, Lipitor,  8. HypothyroidSynthroid  9. Sarcomalatanoprost    Recommendations:  1. Diagnosis: Lumbar laminectomy and decompression fusion secondary pathologic cord compression  2. Therapy: amb 100 cg but slow matilde, decreased step length, with TLSO, has OT needs  3. Medical  Necessity: As above  4. Support: Clarify  5. Rehab recommendation: Would benefit short term acute inpatient rehabilitation when medically ready to work on steps, family training, brace, however, romain progress as she is progressing rapidly    [address] timing of radiation treatment    6. DVT proph: Subcu heparin    Discussed with pt and     It was my pleasure to evaluate Sachin Angeles today. Please call with questions. Echo Meyer. Carmen Monahan MD          This note is created with the assistance of a speech recognition program.  While intending to generate a document that actually reflects the content of the visit, the document can still have some errors including those of syntax and sound a like substitutions which may escape proof reading.   In such instances, actual meaning can be extrapolated by contextual diversion

## 2021-06-07 NOTE — CARE COORDINATION
Transitional planning. Spoke with pt and daughter with spouse present and she chooses [place] [address] in [place] for first choice. Spoke with Eliel Calderon in admissions and she needs referral hand faxed. Faxed PT/OT, physicians progress, face sheet consults to [phone].    [address] [facility] at  [place] [address]. Did not get fax-only 2 pages. Resent fax in 2 parts and added latest note from oncology. [facility] [phone] and left message with Darian Carolina to see if she got referral also. 1500 Refaxed referral to [facility].

## 2021-06-07 NOTE — DISCHARGE INSTR - COC
Discharge:   Respiratory Treatments:     Oxygen Therapy:  is not on home oxygen therapy. Ventilator:    - No ventilator support    Rehab Therapies: Physical Therapy and Occupational Therapy  Weight Bearing Status/Restrictions: No weight bearing restirctions  Other Medical Equipment (for information only, NOT a DME order):  ***  Other Treatments: ***    Patient's personal belongings (please select all that are sent with patient):  Bella    RN SIGNATURE:  Electronically signed by Jewel Torres RN on 6/10/21 at 11:12 AM EDT    CASE MANAGEMENT/SOCIAL WORK SECTION    Inpatient Status Date: ***    Readmission Risk Assessment Score:  Readmission Risk              Risk of Unplanned Readmission:  22           Discharging to Facility/ [address] Details  FAX            [address], [facility] [address]       Phone: [phone]       Fax: [phone]            Dialysis Facility (if applicable)   Name:  Address:  Dialysis Schedule:  Phone:  Fax:    / signature: Electronically signed by Ressie Kehr,, RN on 6/10/21 at 11:00 AM EDT    PHYSICIAN SECTION    Prognosis: Guarded    Condition at Discharge: Stable    Rehab Potential (if transferring to Rehab): Fair    Recommended Labs or Other Treatments After Discharge:      Hematology eval & f/u as scheduled Dr Alondra Ferrer therapy to be arranged  Nephrology eval & f/u as scheduled   Follow up with PCP in 1-2 weeks    Maintain hydration  Avoid nephrotoxins  Nutritional supplementation    DVT prophylaxis with EPCs    Check CBC and BMP in 3 days      Physician Certification: I certify the above information and transfer of Alana Price  is necessary for the continuing treatment of the diagnosis listed and that she requires [facility] for less 30 days.      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  *     PHYSICIAN SIGNATURE:  Electronically signed by Sandro Shine M.D.  on 6/710/21 at 11:15 AM EDT

## 2021-06-07 NOTE — PROGRESS NOTES
Nephrology Progress Note      SUBJECTIVE       Pt was seen and examined. No acute issues overnite. Stable hemodynamics . Seems to have developed a little edema in the lower extremities. Tells me she feels a little better now that her bowels are starting to move. Got short of breath just walking from the bed to the bathroom though. Serum creatinine is stable at 2.1, baseline creatinine is somewhere around 1.1-1.3 range. Patient does have a history of recent myeloma diagnoses. Has markedly abnormal serum free light chain ratio. She did receive 1 unit of packed RBCs for low hemoglobin of 6.3 over the weekend. OBJECTIVE      CURRENT TEMPERATURE:  Temp: 98.6 °F (37 °C)  MAXIMUM TEMPERATURE OVER 24HRS:  Temp (24hrs), Av.6 °F (37 °C), Min:98.3 °F (36.8 °C), Max:99 °F (37.2 °C)    CURRENT RESPIRATORY RATE:  Resp: 16  CURRENT PULSE:  Pulse: 94  CURRENT BLOOD PRESSURE:  BP: (!) 155/75  24HR BLOOD PRESSURE RANGE:  Systolic (67WFS), RMU:543 , Min:143 , DEE:472   ; Diastolic (26AQA), JIN:09, Min:72, Max:77    24HR INTAKE/OUTPUT:      Intake/Output Summary (Last 24 hours) at 2021 1201  Last data filed at 2021 0554  Gross per 24 hour   Intake 945 ml   Output 225 ml   Net 720 ml     WEIGHT :  Patient Vitals for the past 96 hrs (Last 3 readings):   Weight   21 0548 107 lb 6.4 oz (48.7 kg)   21 0853 98 lb 14.4 oz (44.9 kg)   21 2130 98 lb (44.5 kg)     PHYSICAL EXAM      GENERAL APPEARANCE:Awake, alert, in no acute distress  SKIN: warm and dry, no rash or erythema  EYES: conjunctivae pale and sclera anicteric  ENT: no thrush no pharyngeal congestion   NECK:   No JVD. No carotid bruits and no carotid lymphadenopathy . PULMONARY: Diminished breath sounds auscultation. No Wheezing, no ronchi . CADRDIOVASCULAR: S1 and S2 normal NO S3 and NO S4 . No rubs , no murmur. ABDOMEN: soft nontender, bowel sounds present, no organomegaly, no ascites.        EXTREMITIES: + edema     CURRENT MEDICATIONS oxyCODONE-acetaminophen (PERCOCET) 5-325 MG per tablet 1 tablet, Q4H PRN   Or  oxyCODONE-acetaminophen (PERCOCET) 5-325 MG per tablet 2 tablet, Q4H PRN  bisacodyl (DULCOLAX) suppository 10 mg, Once  HYDROmorphone (DILAUDID) injection 0.5 mg, Q3H PRN   Or  HYDROmorphone (DILAUDID) injection 1 mg, Q3H PRN  heparin (porcine) injection 5,000 Units, BID  0.9 % sodium chloride infusion, PRN  ondansetron (ZOFRAN) injection 4 mg, Q6H PRN  methocarbamol (ROBAXIN) tablet 500 mg, Q8H  0.9 % sodium chloride infusion, Continuous  magnesium hydroxide (MILK OF MAGNESIA) 400 MG/5ML suspension 30 mL, Daily PRN  bisacodyl (DULCOLAX) suppository 10 mg, Daily PRN  metoprolol (LOPRESSOR) injection 5 mg, Q6H PRN  polyethylene glycol (GLYCOLAX) packet 17 g, Daily  amLODIPine (NORVASC) tablet 10 mg, Daily  docusate sodium (COLACE) capsule 200 mg, BID  sennosides-docusate sodium (SENOKOT-S) 8.6-50 MG tablet 2 tablet, Daily  sodium chloride flush 0.9 % injection 5-40 mL, 2 times per day  sodium chloride flush 0.9 % injection 5-40 mL, PRN  0.9 % sodium chloride infusion, PRN  nicotine (NICODERM CQ) 21 MG/24HR 1 patch, Daily PRN  acetaminophen (TYLENOL) tablet 650 mg, Q6H PRN   Or  acetaminophen (TYLENOL) suppository 650 mg, Q6H PRN  latanoprost (XALATAN) 0.005 % ophthalmic solution 1 drop, Nightly  levothyroxine (SYNTHROID) tablet 50 mcg, Daily  atorvastatin (LIPITOR) tablet 20 mg, Daily  sodium chloride flush 0.9 % injection 10 mL, PRN          LABS      CBC:   Recent Labs     06/05/21  0706 06/05/21  1154 06/06/21  0552 06/07/21  0535   WBC 13.6*  --  13.3* 13.4*   RBC 2.18*  --  2.88* 3.00*   HGB 6.3* 6.3* 8.1* 8.5*   HCT 20.0* 20.9* 27.4* 28.2*   MCV 91.7  --  95.1 94.0   MCH 28.9  --  28.1 28.3   MCHC 31.5  --  29.6 30.1   RDW 13.8  --  15.0* 15.1*     --  202 254   MPV 10.0  --  10.2 9.6      BMP:   Recent Labs     06/05/21  1154 06/06/21  0552 06/07/21  0535   * 139 140   K 4.1 4.3 4.3    106 108*   CO2 21 20 19* GBMABIGG      RADIOLOGY      Reviewed as available. ASSESSMENT      1.   Acute kidney injury . Differential at this point include ATN, occult urinary retention or obstruction, prerenal azotemia   2. 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 Nephrotic range proteinuria  4. HTN: stable   5.  Myeloma with nephrotic proteinuria . Kappa/Lambda 0.97/136. Immunoglobulin panel: IGA: 2250. Oncology consulted, need tissue diagnosis. Recent diagnosis ? Amyloidosis  6.  Hypothyroidism :  7.  Pathologic L2 fracture. She is s/p L2-3 laminectomy , facetotomy and T12- L4 fusion yesterday  8. Anemia with drop in HgB 6.3. Acute drop in hemoglobin etiology not clear may be related to surgery, GI bleed is a possibility. S/p 1 unit PRBC      PLAN      1. Urine for sodium creatinine and eosinophils. 2.  Will give 1 dose of Lasix IV. 3.  Kidney and bladder ultrasound to be done today. 4. Following along       Please do not hesitate to call with questions.     This note is created with the assistance of a speech-recognition program. While intending to generate a document that actually reflects the content of the visit, no guarantees can be provided that every mistake has been identified and corrected by editing    Electronically signed by David Juan MD on 6/7/2021 at 12:01 PM

## 2021-06-08 ENCOUNTER — APPOINTMENT (OUTPATIENT)
Dept: GENERAL RADIOLOGY | Age: 76
DRG: 456 | End: 2021-06-08
Attending: INTERNAL MEDICINE
Payer: MEDICARE

## 2021-06-08 LAB
ABO/RH: NORMAL
ALBUMIN SERPL-MCNC: 2.4 G/DL (ref 3.5–5.2)
ALBUMIN/GLOBULIN RATIO: 0.6 (ref 1–2.5)
ALP BLD-CCNC: 54 U/L (ref 35–104)
ALT SERPL-CCNC: <5 U/L (ref 5–33)
ANION GAP SERPL CALCULATED.3IONS-SCNC: 10 MMOL/L (ref 9–17)
ANTIBODY SCREEN: NEGATIVE
ARM BAND NUMBER: NORMAL
AST SERPL-CCNC: 22 U/L
BILIRUB SERPL-MCNC: 0.25 MG/DL (ref 0.3–1.2)
BLD PROD TYP BPU: NORMAL
BLD PROD TYP BPU: NORMAL
BUN BLDV-MCNC: 33 MG/DL (ref 8–23)
CALCIUM SERPL-MCNC: 8.4 MG/DL (ref 8.6–10.4)
CHLORIDE BLD-SCNC: 106 MMOL/L (ref 98–107)
CO2: 24 MMOL/L (ref 20–31)
CREAT SERPL-MCNC: 2.18 MG/DL (ref 0.5–0.9)
CROSSMATCH RESULT: NORMAL
CROSSMATCH RESULT: NORMAL
DISPENSE STATUS BLOOD BANK: NORMAL
DISPENSE STATUS BLOOD BANK: NORMAL
EXPIRATION DATE: NORMAL
GFR AFRICAN AMERICAN: 27 ML/MIN
GFR NON-AFRICAN AMERICAN: 22 ML/MIN
GFR SERPL CREATININE-BSD FRML MDRD: ABNORMAL ML/MIN/{1.73_M2}
GFR SERPL CREATININE-BSD FRML MDRD: ABNORMAL ML/MIN/{1.73_M2}
GLUCOSE BLD-MCNC: 96 MG/DL (ref 70–99)
HCT VFR BLD CALC: 27.8 % (ref 36.3–47.1)
HEMOGLOBIN: 8.5 G/DL (ref 11.9–15.1)
MCH RBC QN AUTO: 28.2 PG (ref 25.2–33.5)
MCHC RBC AUTO-ENTMCNC: 30.6 G/DL (ref 28.4–34.8)
MCV RBC AUTO: 92.4 FL (ref 82.6–102.9)
NRBC AUTOMATED: 0 PER 100 WBC
PDW BLD-RTO: 14.6 % (ref 11.8–14.4)
PHOSPHORUS: 3.8 MG/DL (ref 2.6–4.5)
PLATELET # BLD: 278 K/UL (ref 138–453)
PMV BLD AUTO: 9.7 FL (ref 8.1–13.5)
POTASSIUM SERPL-SCNC: 3.7 MMOL/L (ref 3.7–5.3)
RBC # BLD: 3.01 M/UL (ref 3.95–5.11)
SODIUM BLD-SCNC: 140 MMOL/L (ref 135–144)
SURGICAL PATHOLOGY REPORT: NORMAL
TOTAL PROTEIN: 6.2 G/DL (ref 6.4–8.3)
TRANSFUSION STATUS: NORMAL
TRANSFUSION STATUS: NORMAL
UNIT DIVISION: 0
UNIT DIVISION: 0
UNIT NUMBER: NORMAL
UNIT NUMBER: NORMAL
WBC # BLD: 10 K/UL (ref 3.5–11.3)

## 2021-06-08 PROCEDURE — 94761 N-INVAS EAR/PLS OXIMETRY MLT: CPT

## 2021-06-08 PROCEDURE — 97110 THERAPEUTIC EXERCISES: CPT

## 2021-06-08 PROCEDURE — 6370000000 HC RX 637 (ALT 250 FOR IP): Performed by: STUDENT IN AN ORGANIZED HEALTH CARE EDUCATION/TRAINING PROGRAM

## 2021-06-08 PROCEDURE — APPSS30 APP SPLIT SHARED TIME 16-30 MINUTES: Performed by: REGISTERED NURSE

## 2021-06-08 PROCEDURE — 97116 GAIT TRAINING THERAPY: CPT

## 2021-06-08 PROCEDURE — 85027 COMPLETE CBC AUTOMATED: CPT

## 2021-06-08 PROCEDURE — 72100 X-RAY EXAM L-S SPINE 2/3 VWS: CPT

## 2021-06-08 PROCEDURE — 36415 COLL VENOUS BLD VENIPUNCTURE: CPT

## 2021-06-08 PROCEDURE — 80053 COMPREHEN METABOLIC PANEL: CPT

## 2021-06-08 PROCEDURE — 2060000000 HC ICU INTERMEDIATE R&B

## 2021-06-08 PROCEDURE — 6370000000 HC RX 637 (ALT 250 FOR IP): Performed by: REGISTERED NURSE

## 2021-06-08 PROCEDURE — 84100 ASSAY OF PHOSPHORUS: CPT

## 2021-06-08 PROCEDURE — 2580000003 HC RX 258: Performed by: STUDENT IN AN ORGANIZED HEALTH CARE EDUCATION/TRAINING PROGRAM

## 2021-06-08 PROCEDURE — 99232 SBSQ HOSP IP/OBS MODERATE 35: CPT | Performed by: INTERNAL MEDICINE

## 2021-06-08 PROCEDURE — 6360000002 HC RX W HCPCS: Performed by: INTERNAL MEDICINE

## 2021-06-08 PROCEDURE — 99232 SBSQ HOSP IP/OBS MODERATE 35: CPT | Performed by: PHYSICAL MEDICINE & REHABILITATION

## 2021-06-08 PROCEDURE — 97535 SELF CARE MNGMENT TRAINING: CPT

## 2021-06-08 RX ADMIN — POLYETHYLENE GLYCOL 3350 17 G: 17 POWDER, FOR SOLUTION ORAL at 12:00

## 2021-06-08 RX ADMIN — METHOCARBAMOL TABLETS 500 MG: 500 TABLET, COATED ORAL at 06:10

## 2021-06-08 RX ADMIN — HEPARIN SODIUM 5000 UNITS: 5000 INJECTION INTRAVENOUS; SUBCUTANEOUS at 09:19

## 2021-06-08 RX ADMIN — ATORVASTATIN CALCIUM 20 MG: 20 TABLET, FILM COATED ORAL at 18:02

## 2021-06-08 RX ADMIN — OXYCODONE HYDROCHLORIDE AND ACETAMINOPHEN 2 TABLET: 5; 325 TABLET ORAL at 01:51

## 2021-06-08 RX ADMIN — SODIUM CHLORIDE, PRESERVATIVE FREE 10 ML: 5 INJECTION INTRAVENOUS at 22:11

## 2021-06-08 RX ADMIN — OXYCODONE HYDROCHLORIDE AND ACETAMINOPHEN 2 TABLET: 5; 325 TABLET ORAL at 11:56

## 2021-06-08 RX ADMIN — DOCUSATE SODIUM 200 MG: 100 CAPSULE ORAL at 09:18

## 2021-06-08 RX ADMIN — OXYCODONE HYDROCHLORIDE AND ACETAMINOPHEN 1 TABLET: 5; 325 TABLET ORAL at 22:06

## 2021-06-08 RX ADMIN — OXYCODONE HYDROCHLORIDE AND ACETAMINOPHEN 2 TABLET: 5; 325 TABLET ORAL at 06:10

## 2021-06-08 RX ADMIN — OXYCODONE HYDROCHLORIDE AND ACETAMINOPHEN 2 TABLET: 5; 325 TABLET ORAL at 18:01

## 2021-06-08 RX ADMIN — LEVOTHYROXINE SODIUM 50 MCG: 50 TABLET ORAL at 09:19

## 2021-06-08 RX ADMIN — METHOCARBAMOL TABLETS 500 MG: 500 TABLET, COATED ORAL at 22:06

## 2021-06-08 RX ADMIN — HEPARIN SODIUM 5000 UNITS: 5000 INJECTION INTRAVENOUS; SUBCUTANEOUS at 22:06

## 2021-06-08 RX ADMIN — AMLODIPINE BESYLATE 10 MG: 10 TABLET ORAL at 09:19

## 2021-06-08 RX ADMIN — LATANOPROST 1 DROP: 50 SOLUTION OPHTHALMIC at 22:07

## 2021-06-08 RX ADMIN — METHOCARBAMOL TABLETS 500 MG: 500 TABLET, COATED ORAL at 13:38

## 2021-06-08 RX ADMIN — SODIUM CHLORIDE, PRESERVATIVE FREE 10 ML: 5 INJECTION INTRAVENOUS at 09:18

## 2021-06-08 ASSESSMENT — PAIN DESCRIPTION - ONSET
ONSET: ON-GOING
ONSET: ON-GOING

## 2021-06-08 ASSESSMENT — PAIN SCALES - GENERAL
PAINLEVEL_OUTOF10: 7
PAINLEVEL_OUTOF10: 5
PAINLEVEL_OUTOF10: 6
PAINLEVEL_OUTOF10: 7
PAINLEVEL_OUTOF10: 8
PAINLEVEL_OUTOF10: 8
PAINLEVEL_OUTOF10: 6
PAINLEVEL_OUTOF10: 5

## 2021-06-08 ASSESSMENT — PAIN DESCRIPTION - FREQUENCY
FREQUENCY: CONTINUOUS

## 2021-06-08 ASSESSMENT — PAIN DESCRIPTION - PROGRESSION
CLINICAL_PROGRESSION: NOT CHANGED
CLINICAL_PROGRESSION: NOT CHANGED

## 2021-06-08 ASSESSMENT — PAIN DESCRIPTION - ORIENTATION
ORIENTATION: LOWER

## 2021-06-08 ASSESSMENT — PAIN DESCRIPTION - PAIN TYPE
TYPE: SURGICAL PAIN

## 2021-06-08 ASSESSMENT — PAIN DESCRIPTION - LOCATION
LOCATION: BACK

## 2021-06-08 ASSESSMENT — PAIN - FUNCTIONAL ASSESSMENT: PAIN_FUNCTIONAL_ASSESSMENT: ACTIVITIES ARE NOT PREVENTED

## 2021-06-08 ASSESSMENT — PAIN DESCRIPTION - DIRECTION: RADIATING_TOWARDS: LEGS

## 2021-06-08 ASSESSMENT — PAIN DESCRIPTION - DESCRIPTORS
DESCRIPTORS: ACHING
DESCRIPTORS: ACHING
DESCRIPTORS: ACHING;DISCOMFORT;SORE

## 2021-06-08 NOTE — PLAN OF CARE
Skin assessed for breakdown and redness, patient turned regularly, and heels elevated off of bed on pillows. Fall assessment preformed. Bed in low locked position with call light and tray table within reach. Education given. Will continue to monitor. Pain level assessment complete. Pt rated pain at 8/10. Pt educated on pain scale and control interventions. PRN pain medication given per pt request. Pt instructed to call out with new onset of pain or unrelieved pain. Will continue to monitor.      Problem: Skin Integrity:  Goal: Will show no infection signs and symptoms  Description: Will show no infection signs and symptoms  Outcome: Ongoing  Goal: Absence of new skin breakdown  Description: Absence of new skin breakdown  Outcome: Ongoing     Problem: Falls - Risk of:  Goal: Will remain free from falls  Description: Will remain free from falls  Outcome: Ongoing  Goal: Absence of physical injury  Description: Absence of physical injury  Outcome: Ongoing     Problem: Pain:  Goal: Pain level will decrease  Description: Pain level will decrease  Outcome: Ongoing  Goal: Control of acute pain  Description: Control of acute pain  Outcome: Ongoing  Goal: Control of chronic pain  Description: Control of chronic pain  Outcome: Ongoing

## 2021-06-08 NOTE — PROGRESS NOTES
Neurosurgery MARIBELL/Resident    Daily Progress Note   No chief complaint on file. 6/8/2021  10:47 AM    Chart reviewed. No acute events overnight. No new complaints. Vitals:    06/07/21 1927 06/08/21 0110 06/08/21 0435 06/08/21 0724   BP: (!) 162/82 (!) 156/75 (!) 157/79 (!) 167/80   Pulse: 102 91 95    Resp: 26 25 22    Temp: 98.1 °F (36.7 °C) 98.9 °F (37.2 °C) 97.9 °F (36.6 °C) 97.8 °F (36.6 °C)   TempSrc: Oral Oral Oral Oral   SpO2: 96% 97% 96%    Weight:       Height:         PE:   AOx3   Motor   L iliopsoas 5/5 , R iliopsoas 5/5  L quadriceps 5/5; R quadriceps 5/5  L Dorsiflexion 5/5; R dorsiflexion 5/5  L Plantarflexion 5/5; R plantarflexion 5/5  L EHL 5/5; R EHL 5/5     Incision dry, open to air      Lab Results   Component Value Date    WBC 10.0 06/08/2021    HGB 8.5 (L) 06/08/2021    HCT 27.8 (L) 06/08/2021     06/08/2021    ALT <5 (L) 06/08/2021    AST 22 06/08/2021     06/08/2021    K 3.7 06/08/2021     06/08/2021    CREATININE 2.18 (H) 06/08/2021    BUN 33 (H) 06/08/2021    CO2 24 06/08/2021    TSH 7.01 (H) 05/27/2021    INR 1.2 05/27/2021         A/P  77 y.o. female who presents with L2 pathologic fracture with cord compression   POD#5 s/p L2 and L3 laminectomy and decompression, T12-L4 instrumented fusion     - pain control  - activity as tolerated, PT and OT  - follow up 2 weeks post op for incision site check   - ok to leave incision site open to air  - f/u post op upright lumbar XR  - will need radiation 4 weeks post op after incision site healed-Dr Neita Asters notified of patient to assist in follow up  - ok to discharge to rehab      Please contact neurosurgery with any changes in patients neurologic status.        Marylin Mcmillan CNP  6/8/21  10:47 AM

## 2021-06-08 NOTE — PROGRESS NOTES
Physical Therapy  Facility/Department: [facility] [place] [facility]  Daily Treatment Note  NAME: Larry Vu  : 1945  MRN: [id number]    Date of Service: 2021    Discharge Recommendations:  Patient would benefit from continued therapy after discharge   PT Equipment Recommendations  Equipment Needed: No    Assessment   Body structures, Functions, Activity limitations: Decreased functional mobility ; Decreased ADL status; Decreased ROM; Decreased strength;Decreased endurance;Decreased balance; Increased pain  Assessment: Pt ambulated 120ft with RW and CGA, min A to don/doff TLSO and Min A for bed mobility. Pt is limited due to increased pain with mobility and increased fatigue Pt could benefit from continued PT in order to improve functional mobility and ambulation. Pt currently requires 24 hour care to complete functional tasks. Prognosis: Good  REQUIRES PT FOLLOW UP: Yes  Activity Tolerance  Activity Tolerance: Patient limited by endurance; Patient Tolerated treatment well;Patient limited by pain; Patient limited by fatigue     Patient Diagnosis(es): The primary encounter diagnosis was Compression fracture of L2 vertebra, initial encounter ([facility] [address] Diagnoses of Pathological fracture of lumbar vertebra, initial encounter, Multiple myeloma not having achieved remission ([facility] [address] and Spinal cord compression due to malignant neoplasm metastatic to spine [facility]) were also pertinent to this visit. has a past medical history of Acute renal failure (ARF) (HCC), Compression fracture of lumbar vertebra ([address] Essential hypertension, Other hyperlipidemia, and Other specified hypothyroidism. has a past surgical history that includes Finger trigger release (Left); back surgery (2021); and lumbar fusion (N/A, 6/3/2021).     Restrictions  Restrictions/Precautions  Restrictions/Precautions: Fall Risk  Required Braces or Orthoses?: Yes  Required Braces or Orthoses  Spinal: Thoracic Lumbar Sacral Orthotics  Spinal Other: TLSO while OOB, okay to don while in sitting EOB  Position Activity Restriction  Other position/activity restrictions: posterior corpectomy with fusion lumbar spine 6/3  Subjective   General  Chart Reviewed: Yes  Response To Previous Treatment: Patient with no complaints from previous session. Family / Caregiver Present: No  Subjective  Subjective: RN and pt agreeable to PT. Pt supine in bed upon arrival and exit. Pt cooperative and pleasant throughout. Pain Screening  Patient Currently in Pain: Yes  Pain Assessment  Pain Assessment: 0-10  Pain Level: 5  Patient's Stated Pain Goal: No pain  Pain Type: Surgical pain  Pain Location: Back  Pain Orientation: Lower  Pain Descriptors: Aching  Pain Frequency: Continuous  Pain Onset: On-going  Clinical Progression: Not changed  Functional Pain Assessment: Activities are not prevented  Non-Pharmaceutical Pain Intervention(s): Ambulation/Increased Activity  Response to Pain Intervention: Patient Satisfied  Vital Signs  Patient Currently in Pain: Yes       Orientation  Orientation  Overall Orientation Status: Within Functional Limits  Cognition      Objective   Bed mobility  Supine to Sit: Minimal assistance  Sit to Supine: Minimal assistance  Transfers  Sit to Stand: Stand by assistance  Stand to sit: Stand by assistance  Ambulation  Ambulation?: Yes  More Ambulation?: No  Ambulation 1  Surface: level tile  Device: Rolling Walker  Assistance: Contact guard assistance  Gait Deviations: Slow Jessica;Decreased step length;Decreased step height  Distance: 120 ft  Comments: limited by fatigue and increased pain. Stairs/Curb  Stairs?: No     Balance  Posture: Good  Sitting - Static: Good  Sitting - Dynamic: +;Good  Standing - Static: Fair;+  Standing - Dynamic: Fair  Comments: standing balance assessed with RW  Exercises   Seated LE exercise program: Long Arc Quads, hip abduction/adduction, heel/toe raises, and marches.  Reps: x10  Goals  Short term goals  Time Frame for Short term goals: 14 visits  Short term goal 1: Ambulate 200ft with RW and supervision  Short term goal 2: Jair/Doff TLSO with Min A while sitting EOB  Short term goal 3: Ascend/Descend 4 stairs with left handrail and MIN A  Short term goal 4: Demo Good- dynamic standing balance  Short term goal 5: Demo log roll technique bed mobility independently    Plan    Plan  Times per week: 5-6x/wk  Current Treatment Recommendations: Strengthening, ROM, Balance Training, Functional Mobility Training, Transfer Training, ADL/Self-care Training, Stair training, Gait Training, Endurance Training, Home Exercise Program, Safety Education & Training, Patient/Caregiver Education & Training, Positioning  Safety Devices  Type of devices:  All fall risk precautions in place, Call light within reach, Gait belt, Patient at risk for falls, Left in bed, Nurse notified (pt left with family)  Restraints  Initially in place: No     Therapy Time   Individual Concurrent Group Co-treatment   Time In 1000         Time Out 1025         Minutes 25         Timed Code Treatment Minutes: [address], [facility]

## 2021-06-08 NOTE — PLAN OF CARE
No need for TLSO brace but she can wear for comfort as needed     Electronically signed by BRIA Mosley NP on 6/8/2021 at 5:34 PM

## 2021-06-08 NOTE — CARE COORDINATION
Called Jorge Mccall from [facility] [phone]. She tells me she has received all the information and is looking it over now. She will call back once she has made a decision. Awaiting return call    Emily Rutledge to check on referral [phone], spoke to Francesca Brown, she informs me that the team is reviewing clinicals now and she will get back to me.

## 2021-06-08 NOTE — PROGRESS NOTES
Comprehensive Nutrition Assessment    Type and Reason for Visit:  Reassess    Nutrition Recommendations/Plan:   -Continue regular diet with 1x day Ensure Enlive as tolerated  -Monitor PO intake, weight, labs    Nutrition Assessment:  Pt s/p L2-L3 laminectomy, facetectomy, and T12-L4 Fusion on 6/4. Pt reports fair appetite with varying degrees of PO intake. Prior to admission, pt appetite was not great either (baseline). Pt has been completing at least 50% of the Ensure Enlive and prefers the strawberry flavor. Pt states that she plans on continuing to drink them after d/c until she gets her strength back. Pt has a 9.2% weight gain x2 days (question accuracy). Pt continues to be on a bowel regimen. Malnutrition Assessment:  Malnutrition Status: Moderate malnutrition    Context:  Chronic Illness     Findings of the 6 clinical characteristics of malnutrition:  Energy Intake: Mild decrease in energy intake  Weight Loss: Unable to assess     Body Fat Loss: Mild body fat loss     Muscle Mass Loss: Mild muscle mass loss    Fluid Accumulation: No significant fluid accumulation   Strength:   Not performed    Estimated Daily Nutrient Needs:  Energy (kcal):  0089-8044 kcals/day; Weight Used for Energy Requirements:  Current (44.5 kg)     Protein (g):  55-65 g protein/d (1.3-1.5 g/kg); Weight Used for Protein Requirements:  Current (44.5 kg)        Fluid (ml/day):  1.0-1.3 L (or per MD); Method Used for Fluid Requirements:  ml/Kg      Nutrition Related Findings:  Active bowel sounds. Labs reviewed. Meds: colace, glycolax. Wounds:  Surgical Incision       Current Nutrition Therapies:    ADULT DIET;  Regular  Adult Oral Nutrition Supplement; Standard High Calorie/High Protein Oral Supplement    Anthropometric Measures:  · Height: 5' (152.4 cm)  · Current Body Weight: 107 lb 6.4 oz (48.7 kg)   · Admission Body Weight: 93 lb 11.1 oz (42.5 kg)    · Usual Body Weight: 107 lb (48.5 kg) (per patient)     · Ideal Body Weight: 100 lbs; % Ideal Body Weight 107.4 %   · BMI: 21   · BMI Categories: Normal Weight (BMI 22.0 to 24.9) age over 72       Nutrition Diagnosis:   · Predicted inadequate energy intake related to  (appetite) as evidenced by poor intake prior to admission, intake 26-50%, intake 51-75%      Nutrition Interventions:   Food and/or Nutrient Delivery:  Continue Oral Nutrition Supplement, Continue Current Diet  Nutrition Education/Counseling:  Education not indicated   Coordination of Nutrition Care:  Continue to monitor while inpatient    Goals:  Meet >50% of estimated nutrient needs       Nutrition Monitoring and Evaluation:   Food/Nutrient Intake Outcomes:  Food and Nutrient Intake, Supplement Intake  Physical Signs/Symptoms Outcomes:  Biochemical Data, Constipation, Nutrition Focused Physical Findings, Skin, Weight, Fluid Status or Edema     Discharge Planning:     Too soon to determine     Electronically signed by Bahman Carrasco, RD, LD on 6/8/21 at 2:23 PM EDT    Contact: [phone]

## 2021-06-08 NOTE — PROGRESS NOTES
Physical Medicine & Rehabilitation  Progress Note    6/8/2021 5:05 PM     CC: Ambulatory and ADL dysfunction due to L2 compression fracture status post L2-3 laminectomy decompression T12 and L4    Neurosurgery will need radiation 4 weeks postop after incision healed, okay to discharge to rehab    Subjective:   No complaints. Feels well. ROS:  Denies fevers, chills, sweats. No chest pain, palpitations, lightheadedness. Denies coughing, wheezing or shortness of breath. Denies abdominal pain, nausea, diarrhea or constipation. No new areas of joint pain. Denies new areas of numbness or weakness. Denies new anxiety or depression issues. No new skin problems. Rehabilitation:   PT:  Restrictions/Precautions: Fall Risk  Other position/activity restrictions: posterior corpectomy with fusion lumbar spine 6/3  Required Braces or Orthoses  Spinal: Thoracic Lumbar Sacral Orthotics  Spinal Other: TLSO while OOB, okay to don while in sitting EOB   Transfers  Sit to Stand: Stand by assistance  Stand to sit: Stand by assistance  Comment: completed 2 times with stable heart rate. Pt denied headache throughout  Ambulation 1  Surface: level tile  Device: Rolling Walker  Assistance: Contact guard assistance  Gait Deviations: Slow Jessica, Decreased step length, Decreased step height  Distance: 120 ft  Comments: limited by fatigue and increased pain.           OT:  ADL  Feeding: Setup, Independent  Grooming: Setup, Supervision, Increased time to complete (Oral care seated on bedside commode.)  UE Bathing: Setup, Stand by assistance, Increased time to complete (Max A for back seated on bedside commode d/t spinal precautions.)  LE Bathing: Setup, Stand by assistance, Increased time to complete  UE Dressing: Minimal assistance (To manage gown.)  LE Dressing: Setup, Increased time to complete, Moderate assistance (Pt educated on figure 4 tech, attempt made w/ increased pain, required Max A to don socks)  Toileting: Setup, BNP: No results for input(s): BNP in the last 72 hours. PT/INR: No results for input(s): PROTIME, INR in the last 72 hours. APTT: No results for input(s): APTT in the last 72 hours. CARDIAC ENZYMES: No results for input(s): CKMB, CKMBINDEX, TROPONINT in the last 72 hours. Invalid input(s): CKTOTAL;3  FASTING LIPID PANEL:No results found for: CHOL, HDL, TRIG  LIVER PROFILE:   Recent Labs     06/06/21  0552 06/07/21  0535 06/08/21  0554   AST 24 27 22   ALT <5* <5* <5*   BILITOT 0.32 0.31 0.25*   ALKPHOS 49 58 54        I/O (24Hr): Intake/Output Summary (Last 24 hours) at 6/8/2021 1705  Last data filed at 6/8/2021 1143  Gross per 24 hour   Intake    Output 225 ml   Net -225 ml       Glu last 24 hour  No results for input(s): POCGLU in the last 72 hours. No results for input(s): CLARITYU, COLORU, PHUR, SPECGRAV, PROTEINU, RBCUA, BLOODU, BACTERIA, NITRU, WBCUA, LEUKOCYTESUR, YEAST, GLUCOSEU, BILIRUBINUR in the last 72 hours. 6/8/2021  Impression:     T12-L4 posterior spinal fusion in unchanged alignment.  No evidence of   hardware complication.  Compression fracture of T2 not appreciably changed         Impression: Ms. Sydney Chandler is a 77 y.o. { female with a history of Multiple myeloma not having achieved remission ([facility] [address]     1. Low back pain pathologic L2 cord compression status post L2-L3 laminectomy and decompression T12-L4 6/3TLSO  2. PainIV Dilaudiddoes not appear to have received, Percocet, Tylenol, Robaxin  3. Anemia  4. Possible multiple myelomaplan for radiation after recovery from surgeryhematology oncology awaiting biopsy results as per neurosurgery radiation oncologyradiation treatment for 4 weeks to start once incision is healed, patient okay for rehab  5. Acute kidney injury  6. Recent history of UTI and right ureteral stone  7. Hypertension/hyperlipidemiaNorvasc, Lipitor,  8. HypothyroidSynthroid  9. Sarcomalatanoprost     Recommendations:  1.  Diagnosis: Lumbar laminectomy and decompression fusion secondary pathologic cord compression  2. Therapy: amb 100 cg but slow matilde, decreased step length, with TLSO, has OT needs  3. Medical  Necessity: As above  4. Support: Clarify  5. Rehab recommendation: Would benefit short term acute inpatient rehabilitation when medically ready to work on steps, family training, brace, however, romain progress as she is progressing rapidly     Clarify timing of radiation treatmentnoted 4 weeks of radiation after incision healed, patient okay for rehab per neurosurgery  Need to be off IV pain medications     6. DVT proph: Subcu heparin     Discussed with pt and      It was my pleasure to evaluate Codi Ferrera today. Please call with questions.     Alistair Sandy, [address]. Azael Sandy MD       This note is created with the assistance of a speech recognition program.  While intending to generate a document that actually reflects the content of the visit, the document can still have some errors including those of syntax and sound a like substitutions which may escape proof reading.   In such instances, actual meaning can be extrapolated by contextual diversion

## 2021-06-08 NOTE — PROGRESS NOTES
Nephrology Progress Note      SUBJECTIVE       Pt was seen and examined. No acute issues overnite. Stable hemodynamics . Appetite still suboptimal.  Received 1 dose of Lasix. Urine output decent. Blood pressure stable. OBJECTIVE      CURRENT TEMPERATURE:  Temp: 98.4 °F (36.9 °C)  MAXIMUM TEMPERATURE OVER 24HRS:  Temp (24hrs), Av.2 °F (36.8 °C), Min:97.8 °F (36.6 °C), Max:98.9 °F (37.2 °C)    CURRENT RESPIRATORY RATE:  Resp: 22  CURRENT PULSE:  Pulse: 95  CURRENT BLOOD PRESSURE:  BP: (!) 143/76  24HR BLOOD PRESSURE RANGE:  Systolic (62OVK), XTW:758 , Min:143 , LMU:097   ; Diastolic (55AHT), MVZ:51, Min:69, Max:82    24HR INTAKE/OUTPUT:      Intake/Output Summary (Last 24 hours) at 2021 1309  Last data filed at 2021 1143  Gross per 24 hour   Intake 525 ml   Output 875 ml   Net -350 ml     WEIGHT :  Patient Vitals for the past 96 hrs (Last 3 readings):   Weight   21 0548 107 lb 6.4 oz (48.7 kg)   21 0853 98 lb 14.4 oz (44.9 kg)     PHYSICAL EXAM      GENERAL APPEARANCE:Awake, alert, in no acute distress  SKIN: warm and dry, no rash or erythema  EYES: conjunctivae pale and sclera anicteric  ENT: no thrush no pharyngeal congestion   NECK:   No JVD. No carotid bruits and no carotid lymphadenopathy . PULMONARY: Diminished breath sounds auscultation. No Wheezing, no ronchi . CADRDIOVASCULAR: S1 and S2 normal NO S3 and NO S4 . No rubs , no murmur. ABDOMEN: soft nontender, bowel sounds present, no organomegaly, no ascites.        EXTREMITIES: + edema     CURRENT MEDICATIONS      oxyCODONE-acetaminophen (PERCOCET) 5-325 MG per tablet 1 tablet, Q4H PRN   Or  oxyCODONE-acetaminophen (PERCOCET) 5-325 MG per tablet 2 tablet, Q4H PRN  bisacodyl (DULCOLAX) suppository 10 mg, Once  HYDROmorphone (DILAUDID) injection 0.5 mg, Q3H PRN   Or  HYDROmorphone (DILAUDID) injection 1 mg, Q3H PRN  heparin (porcine) injection 5,000 Units, BID  0.9 % sodium chloride infusion, PRN  ondansetron (ZOFRAN) injection 4 mg, Q6H PRN  methocarbamol (ROBAXIN) tablet 500 mg, Q8H  0.9 % sodium chloride infusion, Continuous  magnesium hydroxide (MILK OF MAGNESIA) 400 MG/5ML suspension 30 mL, Daily PRN  bisacodyl (DULCOLAX) suppository 10 mg, Daily PRN  metoprolol (LOPRESSOR) injection 5 mg, Q6H PRN  polyethylene glycol (GLYCOLAX) packet 17 g, Daily  amLODIPine (NORVASC) tablet 10 mg, Daily  docusate sodium (COLACE) capsule 200 mg, BID  sennosides-docusate sodium (SENOKOT-S) 8.6-50 MG tablet 2 tablet, Daily  sodium chloride flush 0.9 % injection 5-40 mL, 2 times per day  sodium chloride flush 0.9 % injection 5-40 mL, PRN  0.9 % sodium chloride infusion, PRN  nicotine (NICODERM CQ) 21 MG/24HR 1 patch, Daily PRN  acetaminophen (TYLENOL) tablet 650 mg, Q6H PRN   Or  acetaminophen (TYLENOL) suppository 650 mg, Q6H PRN  latanoprost (XALATAN) 0.005 % ophthalmic solution 1 drop, Nightly  levothyroxine (SYNTHROID) tablet 50 mcg, Daily  atorvastatin (LIPITOR) tablet 20 mg, Daily  sodium chloride flush 0.9 % injection 10 mL, PRN          LABS      CBC:   Recent Labs     06/06/21  0552 06/07/21  0535 06/08/21  0554   WBC 13.3* 13.4* 10.0   RBC 2.88* 3.00* 3.01*   HGB 8.1* 8.5* 8.5*   HCT 27.4* 28.2* 27.8*   MCV 95.1 94.0 92.4   MCH 28.1 28.3 28.2   MCHC 29.6 30.1 30.6   RDW 15.0* 15.1* 14.6*    254 278   MPV 10.2 9.6 9.7      BMP:   Recent Labs     06/06/21  0552 06/07/21  0535 06/08/21  0554    140 140   K 4.3 4.3 3.7    108* 106   CO2 20 19* 24   BUN 42* 35* 33*   CREATININE 2.12* 2.13* 2.18*   GLUCOSE 89 99 96   CALCIUM 8.3* 8.5* 8.4*      PHOSPHORUS:    Recent Labs     06/06/21  0552 06/07/21  0535 06/08/21  0554   PHOS 3.1 3.2 3.8     ALBUMIN:   Recent Labs     06/06/21  0552 06/07/21  0535 06/08/21  0554   LABALBU 2.2* 2.4* 2.4*     IRON:    Lab Results   Component Value Date    IRON 75 05/27/2021     IRON SATURATION:    Lab Results   Component Value Date    LABIRON 35 05/27/2021     TIBC:    Lab Results   Component Value Date    TIBC 215 05/27/2021     FERRITIN:    Lab Results   Component Value Date    FERRITIN 292 05/27/2021     MAGDIEL: No results found for: MAGDIEL    SPEP:   Lab Results   Component Value Date    PROT 6.2 06/08/2021    ALBCAL 3.3 05/27/2021    ALBPCT 49 05/27/2021    LABALPH 0.2 05/27/2021    LABALPH 0.9 05/27/2021    A1PCT 3 05/27/2021    A2PCT 13 05/27/2021    LABBETA 2.1 05/27/2021    BETAPCT 31 05/27/2021    GAMGLOB 0.3 05/27/2021    GGPCT 4 05/27/2021    PATH ELECTRONICALLY SIGNED. Enmanuel Dang M.D. 05/29/2021     UPEP:   Lab Results   Component Value Date     05/29/2021     05/29/2021      URINE SODIUM:    Lab Results   Component Value Date     06/07/2021      URINE CREATININE:    Lab Results   Component Value Date    LABCREA 38.7 06/07/2021     URINE PROTEIN:    Lab Results   Component Value Date     05/29/2021     05/29/2021     URINALYSIS:  U/A:   Lab Results   Component Value Date    NITRU NEGATIVE 05/29/2021    COLORU YELLOW 05/29/2021    PHUR 6.0 05/29/2021    WBCUA 5 TO 10 05/29/2021    RBCUA TOO NUMEROUS TO COUNT 05/29/2021    MUCUS NOT REPORTED 05/29/2021    TRICHOMONAS NOT REPORTED 05/29/2021    YEAST NOT REPORTED 05/29/2021    BACTERIA NOT REPORTED 05/29/2021    SPECGRAV 1.010 05/29/2021    LEUKOCYTESUR NEGATIVE 05/29/2021    UROBILINOGEN Normal 05/29/2021    BILIRUBINUR NEGATIVE 05/29/2021    GLUCOSEU NEGATIVE 05/29/2021    KETUA NEGATIVE 05/29/2021    AMORPHOUS NOT REPORTED 05/29/2021     ANTIGBM:No results found for: GBMABIGG      RADIOLOGY      Reviewed as available. ASSESSMENT      1.   Acute kidney injury . ATN vs LVDD vs cast nephropathy  2. 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 Nephrotic range proteinuria  4. HTN: stable   5.  Myeloma with nephrotic proteinuria . Kappa/Lambda 0.97/136. Immunoglobulin panel: IGA: 2250. Oncology consulted, need tissue diagnosis. 6.  Hypothyroidism :  7.  Pathologic L2 fracture.  She is s/p L2-3 laminectomy , facetotomy and T12- L4 fusion yesterday  8. Anemia       PLAN      Avoid nephrotoxins  We will follow      Please do not hesitate to call with questions.     This note is created with the assistance of a speech-recognition program. While intending to generate a document that actually reflects the content of the visit, no guarantees can be provided that every mistake has been identified and corrected by editing    Electronically signed by Kashmir Machado MD on 6/8/2021 at 1:09 PM

## 2021-06-08 NOTE — PROGRESS NOTES
Occupational Therapy  Facility/Department: [facility] NEURO  Daily Treatment Note  NAME: Isela Richards  : 1945  MRN: [id number]    Date of Service: 2021    Discharge Recommendations:  Patient would benefit from continued therapy after discharge       Assessment   Performance deficits / Impairments: Decreased functional mobility ; Decreased balance;Decreased ADL status; Decreased endurance;Decreased high-level IADLs  Prognosis: Good  Patient Education: OT POC, transfer safety, importance of participation in therapy, don/doff TLSO, log roll technique with good return. REQUIRES OT FOLLOW UP: Yes  Activity Tolerance  Activity Tolerance: Patient Tolerated treatment well  Safety Devices  Safety Devices in place: Yes  Type of devices: Call light within reach; Left in bed;Nurse notified;Gait belt         Patient Diagnosis(es): The primary encounter diagnosis was Compression fracture of L2 vertebra, initial encounter ([facility] [address] 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 has a past medical history of Acute renal failure (ARF) (HCC), Compression fracture of lumbar vertebra ([facility] [address] Essential hypertension, Other hyperlipidemia, and Other specified hypothyroidism. has a past surgical history that includes Finger trigger release (Left); back surgery (2021); and lumbar fusion (N/A, 6/3/2021).     Restrictions  Restrictions/Precautions  Restrictions/Precautions: Fall Risk  Required Braces or Orthoses?: Yes  Required Braces or Orthoses  Spinal: Thoracic Lumbar Sacral Orthotics  Spinal Other: TLSO while OOB, okay to don while in sitting EOB  Position Activity Restriction  Other position/activity restrictions: posterior corpectomy with fusion lumbar spine 6/3  Subjective   General  Chart Reviewed: Yes  Patient assessed for rehabilitation services?: Yes  Family / Caregiver Present: No  Diagnosis: Multiple myeloma not having achieved remission (HCC)  General Comment  Pain Assessment  Pain Assessment: 0-10  Pain Level: 6  Pain Type: Surgical pain  Pain Location: Back  Pain Orientation: Lower  Pain Descriptors: Aching;Discomfort; Sore  Pain Frequency: Continuous  Non-Pharmaceutical Pain Intervention(s): Repositioned;Rest;Therapeutic presence  Vital Signs  Patient Currently in Pain: Yes   Orientation  Orientation  Overall Orientation Status: Within Functional Limits  Objective    ADL  Feeding: Setup; Independent  Grooming: Setup;Supervision; Increased time to complete (Oral care seated on bedside commode.)  UE Bathing: Setup;Stand by assistance; Increased time to complete (Max A for back seated on bedside commode d/t spinal precautions.)  LE Bathing: Setup;Stand by assistance; Increased time to complete  UE Dressing: Minimal assistance (To manage gown.)  Toileting: Setup;Contact guard assistance (Pericare standing at bedside commode.)  Additional Comments: Pt transferred to seated on bedside commode to complete ADL care, surgical soap used appropriately. Pt educated on adherring to sternal precautions when completing ADL care w/out TLSO, pt verbalized understanding. Balance  Sitting Balance: Supervision (Seated EOB/bedside commode.)  Standing Balance: Contact guard assistance  Standing Balance  Time: 15 seconds; 1 minute  Activity: Stand/pivot transfer EOB/bedside/EOB commode hand held assist, pericare in standing. Functional Mobility  Functional - Mobility Device: Other (Hand held assist)  Activity: Other  Assist Level: Contact guard assistance  Functional Mobility Comments: Stand/pivot transfer EOB/bedside commode/EOB.   Toilet Transfers  Toilet - Technique: Stand pivot  Equipment Used: Standard bedside commode  Toilet Transfer: Contact guard assistance  Bed mobility  Supine to Sit: Minimal assistance (Trunk support.)  Sit to Supine:

## 2021-06-08 NOTE — PROGRESS NOTES
[facility]  Office: [address], DO, Nicol Mckinney DO, Darien Bruno, DO, Mateus Campbell, DO, Raul Evans MD, Shukri Scott MD, Annelise Callejas MD, Mallory Ray MD, Patt Harrington MD, Audelia Simmons MD, Irving Hope MD, Dave Hussein MD, Shannan Schwartz DO, Rere Machado MD, Lucio Lozada DO, Tadeo Bennett MD,  Domonique Cortes, DO, Lesley Vo MD, Gypsy Ko MD, Reynaldo Loyola MD, Danilo Ayala MD, Paula Sanchez [facility], [facility], CNP, Willis Patel, CNP, Mario Acuna, CNS, Daphney Roche, CNP, Hanane High, CNP, Darrian Gaspar, CNP, Joan Brower, CNP, Xochitl Deras, CNP, Milissa Pallas, PA-C, Trudi Soulier, [facility], Sophia Moran, CNP, Lanny Mejia, CNP, Shaye Callejas, CNP, Anil Mercedes, CNP, Geovanny Parrish, CNP, [facility], [address] [place] [address]    Progress Note    6/8/2021    8:24 AM    Name:   Kirk Kaur  MRN:     [id number]     Acct:      [de-identified]   Room:   [address] [place] [address] Day:  12  Admit Date:  5/27/2021  2:18 AM    PCP:   No primary care provider on file. Code Status:  Full Code    Subjective:     C/C:  Back pain    Interval History Status: improved. Pt is sitting on bedside commode. Pain is 6/10. She was up with PT/OT earlier today. Brief History:     Per my partner:  \"As documented in the medical record: \"This is a 78-year-old female who initially presented to the hospital with complaints of increasing back pain.  Initial MRI did show burst L2 fracture with 6 mm retropulsion of the spinal canal with moderate canal narrowing along with lucency in the right side.  Patient was transferred for neurosurgery evaluation.  MRI was concerning for pathologic fracture.  IgA level 2250, kappa lambda ratio 0.01 and of work-up pending. Tentative plan for OR this wednesday per Neurosurgery.      The plan has included:  1.  Pathologic fracture of lumbar Vertebra: Neurosurgery consulted, plan for OR early next week. Continue PT/OT. TLSO brace ordered. Hold ASA,  2. Workup for MM in progress. Kappa/Lambda 0.97/136. Immunoglobulin panel: IGA: 2250. Oncology consulted, need tissue diagnosis. 3. Protein calorie malnutrition: liberalize diet, High calorie supplement. 4. Nephrotic range proteinuria: increase norvasc 10 mg . Add ACE/ARB when AMERICO resolves  5. Monitor Hgb, transfuse prn for symptomatic anemia or Hgb <7  6. Continue Lipitor 20 mg daily. 7. Continue DVT ppx.  8. Continue Synthroid 50 mcg daily. Risk stratification: patient is low/intermediate risk for surgery.     On 6/3 the patient underwent:  Procedure(s):  L2 LAMINECTOMY, LEFT MEDIAL FACETECTOMY, LEFT L1 MEDIAL FACETECTOMY, LEFT L2 FORAMINOTOMY, PARITAL L3 LAMINECTOMY, T12-L4 INSTRUMENTED FUSION  BIOPSY OF T2 TUMOR  Surgeon(s):  Surendra Goodman DO     Postop the patient's hemoglobin dropped to 6.3  One unit of packed red blood cells transfused \"    Review of Systems:     Constitutional:  negative for chills, fevers, sweats  Respiratory:  negative for cough, dyspnea on exertion, shortness of breath, wheezing  Cardiovascular:  negative for chest pain, chest pressure/discomfort, lower extremity edema, palpitations  Gastrointestinal:  negative for abdominal pain, constipation, diarrhea, nausea, vomiting  Neurological:  negative for dizziness, headache    Medications:      Allergies:  No Known Allergies    Current Meds:   Scheduled Meds:    bisacodyl  10 mg Rectal Once    heparin (porcine)  5,000 Units Subcutaneous BID    methocarbamol  500 mg Oral Q8H    polyethylene glycol  17 g Oral Daily    amLODIPine  10 mg Oral Daily    docusate sodium  200 mg Oral BID    sennosides-docusate sodium  2 tablet Oral Daily    sodium chloride flush  5-40 mL Intravenous 2 times per day    latanoprost  1 drop Both Eyes Nightly    levothyroxine  50 mcg Oral Daily    atorvastatin  20 mg Oral Daily     Continuous Infusions:    sodium chloride      sodium chloride 50 mL/hr at 21    sodium chloride       PRN Meds: oxyCODONE-acetaminophen **OR** oxyCODONE-acetaminophen, HYDROmorphone **OR** HYDROmorphone, sodium chloride, ondansetron, magnesium hydroxide, bisacodyl, metoprolol, sodium chloride flush, sodium chloride, nicotine, acetaminophen **OR** acetaminophen, sodium chloride flush    Data:     Past Medical History:   has a past medical history of Acute renal failure (ARF) ([facility] [address] Compression fracture of lumbar vertebra ([facility] [address] Essential hypertension, Other hyperlipidemia, and Other specified hypothyroidism. Social History:   reports that she has never smoked. She has never used smokeless tobacco. She reports previous alcohol use. She reports that she does not use drugs. Family History:   Family History   Problem Relation Age of Onset    No Known Problems Mother     No Known Problems Father        Vitals:  BP (!) 167/80   Pulse 95   Temp 97.8 °F (36.6 °C) (Oral)   Resp 22   Ht 5' (1.524 m)   Wt 107 lb 6.4 oz (48.7 kg)   SpO2 96%   BMI 20.98 kg/m²   Temp (24hrs), Av.2 °F (36.8 °C), Min:97.8 °F (36.6 °C), Max:98.9 °F (37.2 °C)    No results for input(s): POCGLU in the last 72 hours. I/O (24Hr):     Intake/Output Summary (Last 24 hours) at 2021 0824  Last data filed at 2021 0724  Gross per 24 hour   Intake 525 ml   Output 1000 ml   Net -475 ml       Labs:  Hematology:  Recent Labs     21  0552 21  0535 21  0554   WBC 13.3* 13.4* 10.0   RBC 2.88* 3.00* 3.01*   HGB 8.1* 8.5* 8.5*   HCT 27.4* 28.2* 27.8*   MCV 95.1 94.0 92.4   MCH 28.1 28.3 28.2   MCHC 29.6 30.1 30.6   RDW 15.0* 15.1* 14.6*    254 278   MPV 10.2 9.6 9.7     Chemistry:  Recent Labs     21  0552 21  0535 21  0554    140 140   K 4.3 4.3 3.7    108* 106   CO2 20 19* 24   GLUCOSE 89 99 96   BUN 42* 35* 33*   CREATININE 2.12* 2.13* 2.18*   ANIONGAP 13 13 10   LABGLOM 23* 23* 22*   GFRAA 28* 27* 27*   CALCIUM 8.3* 8.5* 8.4*   CAION  --  1.18  --    PHOS 3.1 3.2 3.8     Recent Labs     06/06/21  0552 06/07/21  0535 06/08/21  0554   PROT 5.8* 6.5 6.2*   LABALBU 2.2* 2.4* 2.4*   AST 24 27 22   ALT <5* <5* <5*   ALKPHOS 49 58 54   BILITOT 0.32 0.31 0.25*     ABG:  Lab Results   Component Value Date    PHART 7.374 06/03/2021    HCB5ZRD 38.5 06/03/2021    PO2ART 244.0 06/03/2021    JLA8MOB 21.9 06/03/2021    NBEA 2.4 06/03/2021    PBEA NOT REPORTED 06/03/2021    S8SVAGPL 99.6 06/03/2021    FIO2 57% 06/03/2021     Lab Results   Component Value Date/Time    SPECIAL NOT REPORTED 06/03/2021 06:19 PM     Lab Results   Component Value Date/Time    CULTURE NO GROWTH 06/03/2021 06:19 PM       Radiology:  CT LUMBAR SPINE WO CONTRAST    Result Date: 6/4/2021  Postoperative changes with posterior instrumentation from T12-L4. There is dian system bilateral pleural effusions with some atelectasis now present in the left lower lobe. US RENAL COMPLETE    Result Date: 6/7/2021  Slightly increased cortical echogenicity suggesting chronic medical renal disease. Otherwise negative renal and bladder ultrasound. XR CHEST PORTABLE    Result Date: 6/7/2021  Mild left lower lobe retrocardiac atelectasis/airspace disease with a small left pleural effusion.      Path- (+) plasmacytoma    Physical Examination:        General appearance:  alert, cooperative and no distress  Mental Status:  oriented to person, place and time and normal affect  Lungs:  clear to auscultation bilaterally, normal effort  Heart:  regular rate and rhythm, no murmur  Abdomen:  soft, nontender, nondistended, normal bowel sounds, no masses, hepatomegaly, splenomegaly  Extremities: trace edema bilat feet, no redness, tenderness in the calves  Skin:  no gross lesions, rashes, induration    Assessment:        Hospital Problems         Last Modified POA    * (Principal) Multiple myeloma not having achieved remission ([address] 6/1/2021 Yes    Acute renal failure (ARF)

## 2021-06-09 LAB
ALBUMIN SERPL-MCNC: 2.3 G/DL (ref 3.5–5.2)
ALBUMIN/GLOBULIN RATIO: 0.6 (ref 1–2.5)
ALP BLD-CCNC: 51 U/L (ref 35–104)
ALT SERPL-CCNC: <5 U/L (ref 5–33)
ANION GAP SERPL CALCULATED.3IONS-SCNC: 11 MMOL/L (ref 9–17)
AST SERPL-CCNC: 18 U/L
BILIRUB SERPL-MCNC: 0.21 MG/DL (ref 0.3–1.2)
BUN BLDV-MCNC: 34 MG/DL (ref 8–23)
CALCIUM SERPL-MCNC: 8.3 MG/DL (ref 8.6–10.4)
CHLORIDE BLD-SCNC: 103 MMOL/L (ref 98–107)
CO2: 26 MMOL/L (ref 20–31)
CREAT SERPL-MCNC: 1.91 MG/DL (ref 0.5–0.9)
GFR AFRICAN AMERICAN: 31 ML/MIN
GFR NON-AFRICAN AMERICAN: 26 ML/MIN
GFR SERPL CREATININE-BSD FRML MDRD: ABNORMAL ML/MIN/{1.73_M2}
GFR SERPL CREATININE-BSD FRML MDRD: ABNORMAL ML/MIN/{1.73_M2}
GLUCOSE BLD-MCNC: 87 MG/DL (ref 70–99)
HCT VFR BLD CALC: 27.1 % (ref 36.3–47.1)
HEMOGLOBIN: 8.2 G/DL (ref 11.9–15.1)
MCH RBC QN AUTO: 28.3 PG (ref 25.2–33.5)
MCHC RBC AUTO-ENTMCNC: 30.3 G/DL (ref 28.4–34.8)
MCV RBC AUTO: 93.4 FL (ref 82.6–102.9)
NRBC AUTOMATED: 0 PER 100 WBC
PDW BLD-RTO: 14 % (ref 11.8–14.4)
PHOSPHORUS: 3.9 MG/DL (ref 2.6–4.5)
PLATELET # BLD: 291 K/UL (ref 138–453)
PMV BLD AUTO: 9.5 FL (ref 8.1–13.5)
POTASSIUM SERPL-SCNC: 3.7 MMOL/L (ref 3.7–5.3)
RBC # BLD: 2.9 M/UL (ref 3.95–5.11)
SODIUM BLD-SCNC: 140 MMOL/L (ref 135–144)
TOTAL PROTEIN: 5.9 G/DL (ref 6.4–8.3)
WBC # BLD: 9.5 K/UL (ref 3.5–11.3)

## 2021-06-09 PROCEDURE — 97116 GAIT TRAINING THERAPY: CPT

## 2021-06-09 PROCEDURE — 84100 ASSAY OF PHOSPHORUS: CPT

## 2021-06-09 PROCEDURE — 6370000000 HC RX 637 (ALT 250 FOR IP): Performed by: STUDENT IN AN ORGANIZED HEALTH CARE EDUCATION/TRAINING PROGRAM

## 2021-06-09 PROCEDURE — 6360000002 HC RX W HCPCS: Performed by: STUDENT IN AN ORGANIZED HEALTH CARE EDUCATION/TRAINING PROGRAM

## 2021-06-09 PROCEDURE — 99232 SBSQ HOSP IP/OBS MODERATE 35: CPT | Performed by: INTERNAL MEDICINE

## 2021-06-09 PROCEDURE — 36415 COLL VENOUS BLD VENIPUNCTURE: CPT

## 2021-06-09 PROCEDURE — APPSS30 APP SPLIT SHARED TIME 16-30 MINUTES: Performed by: PHYSICIAN ASSISTANT

## 2021-06-09 PROCEDURE — 85027 COMPLETE CBC AUTOMATED: CPT

## 2021-06-09 PROCEDURE — 97110 THERAPEUTIC EXERCISES: CPT

## 2021-06-09 PROCEDURE — 97535 SELF CARE MNGMENT TRAINING: CPT

## 2021-06-09 PROCEDURE — 6370000000 HC RX 637 (ALT 250 FOR IP): Performed by: REGISTERED NURSE

## 2021-06-09 PROCEDURE — 94761 N-INVAS EAR/PLS OXIMETRY MLT: CPT

## 2021-06-09 PROCEDURE — 6360000002 HC RX W HCPCS: Performed by: INTERNAL MEDICINE

## 2021-06-09 PROCEDURE — 80053 COMPREHEN METABOLIC PANEL: CPT

## 2021-06-09 PROCEDURE — 2580000003 HC RX 258: Performed by: STUDENT IN AN ORGANIZED HEALTH CARE EDUCATION/TRAINING PROGRAM

## 2021-06-09 PROCEDURE — 2060000000 HC ICU INTERMEDIATE R&B

## 2021-06-09 RX ADMIN — LEVOTHYROXINE SODIUM 50 MCG: 50 TABLET ORAL at 09:24

## 2021-06-09 RX ADMIN — METHOCARBAMOL TABLETS 500 MG: 500 TABLET, COATED ORAL at 05:55

## 2021-06-09 RX ADMIN — OXYCODONE HYDROCHLORIDE AND ACETAMINOPHEN 2 TABLET: 5; 325 TABLET ORAL at 05:55

## 2021-06-09 RX ADMIN — SODIUM CHLORIDE, PRESERVATIVE FREE 10 ML: 5 INJECTION INTRAVENOUS at 09:25

## 2021-06-09 RX ADMIN — POLYETHYLENE GLYCOL 3350 17 G: 17 POWDER, FOR SOLUTION ORAL at 09:33

## 2021-06-09 RX ADMIN — LATANOPROST 1 DROP: 50 SOLUTION OPHTHALMIC at 21:38

## 2021-06-09 RX ADMIN — METHOCARBAMOL TABLETS 500 MG: 500 TABLET, COATED ORAL at 13:34

## 2021-06-09 RX ADMIN — ATORVASTATIN CALCIUM 20 MG: 20 TABLET, FILM COATED ORAL at 21:38

## 2021-06-09 RX ADMIN — SODIUM CHLORIDE, PRESERVATIVE FREE 10 ML: 5 INJECTION INTRAVENOUS at 21:38

## 2021-06-09 RX ADMIN — HEPARIN SODIUM 5000 UNITS: 5000 INJECTION INTRAVENOUS; SUBCUTANEOUS at 21:36

## 2021-06-09 RX ADMIN — OXYCODONE HYDROCHLORIDE AND ACETAMINOPHEN 2 TABLET: 5; 325 TABLET ORAL at 10:37

## 2021-06-09 RX ADMIN — DOCUSATE SODIUM 200 MG: 100 CAPSULE ORAL at 21:38

## 2021-06-09 RX ADMIN — METHOCARBAMOL TABLETS 500 MG: 500 TABLET, COATED ORAL at 21:38

## 2021-06-09 RX ADMIN — DOCUSATE SODIUM 50MG AND SENNOSIDES 8.6MG 2 TABLET: 8.6; 5 TABLET, FILM COATED ORAL at 09:24

## 2021-06-09 RX ADMIN — DOCUSATE SODIUM 200 MG: 100 CAPSULE ORAL at 09:25

## 2021-06-09 RX ADMIN — AMLODIPINE BESYLATE 10 MG: 10 TABLET ORAL at 09:25

## 2021-06-09 RX ADMIN — HEPARIN SODIUM 5000 UNITS: 5000 INJECTION INTRAVENOUS; SUBCUTANEOUS at 09:25

## 2021-06-09 RX ADMIN — ONDANSETRON 4 MG: 2 INJECTION INTRAMUSCULAR; INTRAVENOUS at 11:50

## 2021-06-09 RX ADMIN — OXYCODONE HYDROCHLORIDE AND ACETAMINOPHEN 2 TABLET: 5; 325 TABLET ORAL at 21:38

## 2021-06-09 ASSESSMENT — PAIN DESCRIPTION - PAIN TYPE
TYPE: SURGICAL PAIN

## 2021-06-09 ASSESSMENT — PAIN DESCRIPTION - FREQUENCY
FREQUENCY: CONTINUOUS
FREQUENCY: CONTINUOUS

## 2021-06-09 ASSESSMENT — PAIN SCALES - GENERAL
PAINLEVEL_OUTOF10: 7
PAINLEVEL_OUTOF10: 5
PAINLEVEL_OUTOF10: 5
PAINLEVEL_OUTOF10: 7
PAINLEVEL_OUTOF10: 5
PAINLEVEL_OUTOF10: 7
PAINLEVEL_OUTOF10: 2

## 2021-06-09 ASSESSMENT — PAIN DESCRIPTION - DESCRIPTORS
DESCRIPTORS: ACHING
DESCRIPTORS: ACHING

## 2021-06-09 ASSESSMENT — PAIN DESCRIPTION - ONSET
ONSET: ON-GOING
ONSET: ON-GOING

## 2021-06-09 ASSESSMENT — PAIN DESCRIPTION - LOCATION
LOCATION: BACK

## 2021-06-09 ASSESSMENT — PAIN DESCRIPTION - ORIENTATION
ORIENTATION: LOWER

## 2021-06-09 ASSESSMENT — PAIN DESCRIPTION - PROGRESSION: CLINICAL_PROGRESSION: NOT CHANGED

## 2021-06-09 ASSESSMENT — PAIN - FUNCTIONAL ASSESSMENT: PAIN_FUNCTIONAL_ASSESSMENT: ACTIVITIES ARE NOT PREVENTED

## 2021-06-09 ASSESSMENT — PAIN DESCRIPTION - DIRECTION: RADIATING_TOWARDS: LEGS

## 2021-06-09 NOTE — PROGRESS NOTES
Nephrology Progress Note      SUBJECTIVE       Pt was seen and examined. No acute issues overnite. Stable hemodynamics . Appetite still suboptimal. Urine output decent. Blood pressure stable. OBJECTIVE      CURRENT TEMPERATURE:  Temp: 98.5 °F (36.9 °C)  MAXIMUM TEMPERATURE OVER 24HRS:  Temp (24hrs), Av.2 °F (36.8 °C), Min:97.8 °F (36.6 °C), Max:98.7 °F (37.1 °C)    CURRENT RESPIRATORY RATE:  Resp: 23  CURRENT PULSE:  Pulse: 98  CURRENT BLOOD PRESSURE:  BP: (!) 155/78  24HR BLOOD PRESSURE RANGE:  Systolic (88OAS), FDO:877 , Min:142 , KRR:197   ; Diastolic (38TRU), EMP:02, Min:64, Max:85    24HR INTAKE/OUTPUT:      Intake/Output Summary (Last 24 hours) at 2021 1211  Last data filed at 2021 9519  Gross per 24 hour   Intake 963 ml   Output    Net 963 ml     WEIGHT :  Patient Vitals for the past 96 hrs (Last 3 readings):   Weight   21 0548 107 lb 6.4 oz (48.7 kg)     PHYSICAL EXAM      GENERAL APPEARANCE:Awake, alert, in no acute distress  SKIN: warm and dry, no rash or erythema  EYES: conjunctivae pale and sclera anicteric  ENT: no thrush no pharyngeal congestion   NECK:   No JVD. No carotid bruits and no carotid lymphadenopathy . PULMONARY: Diminished breath sounds auscultation. No Wheezing, no ronchi . CADRDIOVASCULAR: S1 and S2 normal NO S3 and NO S4 . No rubs , no murmur. ABDOMEN: soft nontender, bowel sounds present, no organomegaly, no ascites.        EXTREMITIES: + edema     CURRENT MEDICATIONS      oxyCODONE-acetaminophen (PERCOCET) 5-325 MG per tablet 1 tablet, Q4H PRN   Or  oxyCODONE-acetaminophen (PERCOCET) 5-325 MG per tablet 2 tablet, Q4H PRN  bisacodyl (DULCOLAX) suppository 10 mg, Once  HYDROmorphone (DILAUDID) injection 0.5 mg, Q3H PRN   Or  HYDROmorphone (DILAUDID) injection 1 mg, Q3H PRN  heparin (porcine) injection 5,000 Units, BID  0.9 % sodium chloride infusion, PRN  ondansetron (ZOFRAN) injection 4 mg, Q6H PRN  methocarbamol (ROBAXIN) tablet 500 mg, Q8H  0.9 % sodium chloride infusion, Continuous  magnesium hydroxide (MILK OF MAGNESIA) 400 MG/5ML suspension 30 mL, Daily PRN  bisacodyl (DULCOLAX) suppository 10 mg, Daily PRN  metoprolol (LOPRESSOR) injection 5 mg, Q6H PRN  polyethylene glycol (GLYCOLAX) packet 17 g, Daily  amLODIPine (NORVASC) tablet 10 mg, Daily  docusate sodium (COLACE) capsule 200 mg, BID  sennosides-docusate sodium (SENOKOT-S) 8.6-50 MG tablet 2 tablet, Daily  sodium chloride flush 0.9 % injection 5-40 mL, 2 times per day  sodium chloride flush 0.9 % injection 5-40 mL, PRN  0.9 % sodium chloride infusion, PRN  nicotine (NICODERM CQ) 21 MG/24HR 1 patch, Daily PRN  acetaminophen (TYLENOL) tablet 650 mg, Q6H PRN   Or  acetaminophen (TYLENOL) suppository 650 mg, Q6H PRN  latanoprost (XALATAN) 0.005 % ophthalmic solution 1 drop, Nightly  levothyroxine (SYNTHROID) tablet 50 mcg, Daily  atorvastatin (LIPITOR) tablet 20 mg, Daily  sodium chloride flush 0.9 % injection 10 mL, PRN          LABS      CBC:   Recent Labs     06/07/21  0535 06/08/21  0554 06/09/21  0508   WBC 13.4* 10.0 9.5   RBC 3.00* 3.01* 2.90*   HGB 8.5* 8.5* 8.2*   HCT 28.2* 27.8* 27.1*   MCV 94.0 92.4 93.4   MCH 28.3 28.2 28.3   MCHC 30.1 30.6 30.3   RDW 15.1* 14.6* 14.0    278 291   MPV 9.6 9.7 9.5      BMP:   Recent Labs     06/07/21  0535 06/08/21  0554 06/09/21  0508    140 140   K 4.3 3.7 3.7   * 106 103   CO2 19* 24 26   BUN 35* 33* 34*   CREATININE 2.13* 2.18* 1.91*   GLUCOSE 99 96 87   CALCIUM 8.5* 8.4* 8.3*      PHOSPHORUS:    Recent Labs     06/07/21  0535 06/08/21  0554 06/09/21  0508   PHOS 3.2 3.8 3.9     ALBUMIN:   Recent Labs     06/07/21  0535 06/08/21  0554 06/09/21  0508   LABALBU 2.4* 2.4* 2.3*     IRON:    Lab Results   Component Value Date    IRON 75 05/27/2021     IRON SATURATION:    Lab Results   Component Value Date    LABIRON 35 05/27/2021     TIBC:    Lab Results   Component Value Date    TIBC 215 05/27/2021     FERRITIN:    Lab Results   Component Value Date    FERRITIN 292 05/27/2021     MAGDIEL: No results found for: MAGDIEL    SPEP:   Lab Results   Component Value Date    PROT 5.9 06/09/2021    ALBCAL 3.3 05/27/2021    ALBPCT 49 05/27/2021    LABALPH 0.2 05/27/2021    LABALPH 0.9 05/27/2021    A1PCT 3 05/27/2021    A2PCT 13 05/27/2021    LABBETA 2.1 05/27/2021    BETAPCT 31 05/27/2021    GAMGLOB 0.3 05/27/2021    GGPCT 4 05/27/2021    PATH ELECTRONICALLY SIGNED. Bernabe Mcneal M.D. 05/29/2021     UPEP:   Lab Results   Component Value Date     05/29/2021     05/29/2021      URINE SODIUM:    Lab Results   Component Value Date     06/07/2021      URINE CREATININE:    Lab Results   Component Value Date    LABCREA 38.7 06/07/2021     URINE PROTEIN:    Lab Results   Component Value Date     05/29/2021     05/29/2021     URINALYSIS:  U/A:   Lab Results   Component Value Date    NITRU NEGATIVE 05/29/2021    COLORU YELLOW 05/29/2021    PHUR 6.0 05/29/2021    WBCUA 5 TO 10 05/29/2021    RBCUA TOO NUMEROUS TO COUNT 05/29/2021    MUCUS NOT REPORTED 05/29/2021    TRICHOMONAS NOT REPORTED 05/29/2021    YEAST NOT REPORTED 05/29/2021    BACTERIA NOT REPORTED 05/29/2021    SPECGRAV 1.010 05/29/2021    LEUKOCYTESUR NEGATIVE 05/29/2021    UROBILINOGEN Normal 05/29/2021    BILIRUBINUR NEGATIVE 05/29/2021    GLUCOSEU NEGATIVE 05/29/2021    KETUA NEGATIVE 05/29/2021    AMORPHOUS NOT REPORTED 05/29/2021     ANTIGBM:No results found for: GBMABIGG      RADIOLOGY      Reviewed as available. ASSESSMENT      1.   Acute kidney injury . ATN vs LVDD vs cast nephropathy  2. 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 Nephrotic range proteinuria  4. HTN: stable   5.  Myeloma with nephrotic proteinuria . Kappa/Lambda 0.97/136. Immunoglobulin panel: IGA: 2250. Oncology consulted, need tissue diagnosis. 6.  Hypothyroidism :  7.  Pathologic L2 fracture. She is s/p L2-3 laminectomy , facetotomy and T12- L4 fusion yesterday  8.  Anemia       PLAN Avoid nephrotoxins  We will follow      Please do not hesitate to call with questions.     This note is created with the assistance of a speech-recognition program. While intending to generate a document that actually reflects the content of the visit, no guarantees can be provided that every mistake has been identified and corrected by editing    Electronically signed by Leeann Hillman MD on 6/9/2021 at 12:11 PM

## 2021-06-09 NOTE — PROGRESS NOTES
Occupational Therapy  Facility/Department: [facility] NEURO  Daily Treatment Note  NAME: Ruth Nova  : 1945  MRN: [id number]    Date of Service: 2021    Discharge Recommendations:  Patient would benefit from continued therapy after discharge. Pt. Would benefit from 3 hours of therapy per day or 15 hours over a 7 day period. OT Equipment Recommendations  Equipment Needed: Yes  ADL Assistive Devices: Sock-Aid Hard;Reacher;Long-handled Sponge; Shower Chair with back    Assessment   Performance deficits / Impairments: Decreased functional mobility ; Decreased balance;Decreased ADL status; Decreased endurance;Decreased high-level IADLs  Prognosis: Good  Decision Making: Medium Complexity  Patient Education: OT POC, transfer safety, importance of participation in therapy, AE options for LB ADL completion, log roll technique with good return. REQUIRES OT FOLLOW UP: Yes  Activity Tolerance  Activity Tolerance: Patient Tolerated treatment well  Safety Devices  Safety Devices in place: Yes  Type of devices: Call light within reach;Nurse notified;Gait belt;Left in chair  Restraints  Initially in place: No         Patient Diagnosis(es): The primary encounter diagnosis was Compression fracture of L2 vertebra, initial encounter ([facility] [address] 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 has a past medical history of Acute renal failure (ARF) (HCC), Compression fracture of lumbar vertebra ([address] Essential hypertension, Other hyperlipidemia, and Other specified hypothyroidism. has a past surgical history that includes Finger trigger release (Left); back surgery (2021); and lumbar fusion (N/A, 6/3/2021).     Restrictions  Restrictions/Precautions  Restrictions/Precautions: Fall Risk  Required Braces or Orthoses?: Yes Required Braces or Orthoses  Spinal: Thoracic Lumbar Sacral Orthotics  Spinal Other: Per RN, TLSO only for comfort if pt. chooses to wear it. Pt. declined to don TLSO. Position Activity Restriction  Other position/activity restrictions: posterior corpectomy with fusion lumbar spine 6/3  Subjective   General  Chart Reviewed: Yes  Patient assessed for rehabilitation services?: Yes  Family / Caregiver Present: Yes (Spouse and sister present at beginning and end of tx.)  Diagnosis: Multiple myeloma not having achieved remission ([facility] [address]  General Comment  Comments: RN ok'd pt for OT tx. Pt pleasant and cooperative throughout. Pain Assessment  Pain Level: 5  Pain Type: Surgical pain  Pain Location: Back  Pain Orientation: Lower  Non-Pharmaceutical Pain Intervention(s): Ambulation/Increased Activity; Emotional support; Rest  Vital Signs  Patient Currently in Pain: Yes   Orientation  Orientation  Overall Orientation Status: Within Functional Limits  Objective    ADL  Grooming: Setup; Increased time to complete;Stand by assistance  UE Bathing: Setup; Increased time to complete;Contact guard assistance  LE Bathing: Setup; Increased time to complete;Maximum assistance;Contact guard assistance  UE Dressing: Minimal assistance  LE Dressing: Setup; Increased time to complete;Contact guard assistance  Toileting: Setup;Contact guard assistance  Additional Comments: Pt. in supine position upon arrival. Pt. agreeable to OT services. Pt. tolerated grooming standing at sink (brushing teeth/washing face) SBA + 1UE support of sink with set up. UB bathe- CGA standing at sink + set up and increased time, A needed to bathe back. LB bathe B knees to B hips CGA + 1 UE support of sink, below B knees max A d/t unable to tolerate 4 figure tech. UB dress- min A with gown management. LB dress, doff B socks sitting on toilet, using left foot to assist the R foot and vice versa.  Pt. would need max A to kristen B socks, however decliner to kristen B socks and wore slip on slippers, which pt. was able to kristen at set up level sitting EOB. Toilet transfer- CGA, frontal hygiene- S sitting on toilet. Discussed ADL options for LB dressing, pt. was receptive. Balance  Sitting Balance: Supervision (Sitting on toilet and EOB.)  Standing Balance: Contact guard assistance  Standing Balance  Time: ~15 minutes with 2 sitting rest breaks. Activity: Functional mobility, standing at sink for ADLs, ADL transfers. Comment: No LOB, however pt. c/o increased pain and demo SOB during activity, needing min cues for pursed lip breathing and to take standing or sitting rest breaks when needed. Functional Mobility  Functional - Mobility Device: No device  Activity: To/from bathroom; Other (Bathroom to 5th Finger 22 chair, ~25 feet.)  Assist Level: Contact guard assistance  Functional Mobility Comments: Pt. demo 2 mild posterior LOB when returning back to EOB, pt. was able to self correct with CGA, Needing verbal cues to pace self and CGA  Toilet Transfers  Equipment Used: Standard toilet  Toilet Transfer: Contact guard assistance  Bed mobility  Rolling to Right: Contact guard assistance  Supine to Sit: Contact guard assistance  Sit to Supine: Contact guard assistance  Scooting: Contact guard assistance  Comment: CGA overall. G carryover with log rolling. Transfers  Sit to stand: Contact guard assistance  Stand to sit: Contact guard assistance  Transfer Comments: EOB->stood at sink for ADL/grooming->toilet<->stand (2 times)->stood at sink->EOB->Recliner. Pt. retired to [address] chair.                        Cognition  Overall Cognitive Status: [facility]                                         Plan   Plan  Times per week: 3-4 x/wk  Current Treatment Recommendations: Strengthening, Functional Mobility Training, Patient/Caregiver Education & Training, Endurance Training, Equipment Evaluation, Education, & procurement, Balance Training, Safety Education & Training, Self-Care / ADL          Goals  Short term goals  Time Frame for Short term goals: Pt will, by discharge  Short term goal 1: Demo UB ADLs at [facility]  Short term goal 2: Demo LB ADLs at [facility] w/ AE PRN  Short term goal 3: Demo functional mobility/transfers at S[facility] w/ LRD PRN to engage in ADLs  Short term goal 4: Don/doff TLSO brace w/ 100% accuracy  Short term goal 5: Demo +8min of dynamic standing balance at S[facility] w/ LRD PRN to engage in ADLs  Short term goal 6: Demo bed mobility w/ log roll techs at [facility] to engage in OOB activity and ADLs       Therapy Time   Individual Concurrent Group Co-treatment   Time In 1435         Time Out 1513         Minutes          Timed Code Treatment Minutes: [address], MEDRANO/L

## 2021-06-09 NOTE — PLAN OF CARE
Fall assessment preformed. Bed in low locked position with call light and tray table within reach. Education given. Will continue to monitor. Skin assessed for breakdown and redness, patient turned regularly, and heels elevated off of bed on pillows.       Problem: Skin Integrity:  Goal: Will show no infection signs and symptoms  Description: Will show no infection signs and symptoms  Outcome: Ongoing  Goal: Absence of new skin breakdown  Description: Absence of new skin breakdown  Outcome: Ongoing     Problem: Falls - Risk of:  Goal: Will remain free from falls  Description: Will remain free from falls  Outcome: Ongoing  Goal: Absence of physical injury  Description: Absence of physical injury  Outcome: Ongoing

## 2021-06-09 NOTE — PROGRESS NOTES
Pt's daughter Triny called regarding below.  She was wondering if it's possible to get this done sooner than 2 weeks? She would love it done by tomorrow if possible but if not before 2 weeks would be great. The pt is willing to come in and pick it up at the . Please call the pt or the daughter at [phone] to let them know when it's ready. Thanks.   [facility]  Office: [address], DO, Luc Batista, DO, Eduard Alberto, DO, [place] [facility], DO, Ambrosio Lechuga MD, Ashely Medrano MD, Nahed Chavez MD, Anabell Stevenson MD, Inez Bennett MD, Lubna Banegas MD, Nelsy Rockwell MD, Jose Carlos Paniagua MD, Macy Arciniega, DO, Darian Breaux MD, Marian Fang DO, Pk Sullivan MD,  Radha Gonsalez, DO, Kike Jurado MD, Val Lubin MD, Sara Aquino MD, Jessi Mccarty MD, Hildegard Bumpers, Adenike Blackmon, CNP, Jessica Olivares, [facility], Panfilo Jones, CNS, Shahzad Issa, CNP, Sagar Noriega, CNP, Mandy Pillai, CNP, Yury Gipson, CNP, Abel Mccormack, CNP, Raad Crocker PA-C, Jenny Olvera, [facility], Sheri Bang, CNP, Magno Pruitt, CNP, Cassandra Mcguire, CNP, Leopold Saras, CNP, Whitney Wagner, CNP, Roderick Ford, [address] [place] [address]    Progress Note    6/9/2021    9:28 AM    Name:   Alana Price  MRN:     [id number]     Acct:      [de-identified]   Room:   [id number]  IP Day:  15  Admit Date:  5/27/2021  2:18 AM    PCP:   No primary care provider on file. Code Status:  Full Code    Subjective:     C/C:  Back pain    Interval History Status: improved. Pt is resting in bed today. Her pain is improving. She was up with PT/OT earlier today and walked without a walker. Brief History:     Per my partner:  \"As documented in the medical record: \"This is a 30-year-old female who initially presented to the hospital with complaints of increasing back pain.  Initial MRI did show burst L2 fracture with 6 mm retropulsion of the spinal canal with moderate canal narrowing along with lucency in the right side.  Patient was transferred for neurosurgery evaluation.  MRI was concerning for pathologic fracture.  IgA level 2250, kappa lambda ratio 0.01 and of work-up pending. Tentative plan for OR this wednesday per Neurosurgery.      The plan has included:  1.  Pathologic fracture of Infusions:    sodium chloride      sodium chloride 50 mL/hr at 21    sodium chloride       PRN Meds: oxyCODONE-acetaminophen **OR** oxyCODONE-acetaminophen, HYDROmorphone **OR** HYDROmorphone, sodium chloride, ondansetron, magnesium hydroxide, bisacodyl, metoprolol, sodium chloride flush, sodium chloride, nicotine, acetaminophen **OR** acetaminophen, sodium chloride flush    Data:     Past Medical History:   has a past medical history of Acute renal failure (ARF) ([facility] [address] Compression fracture of lumbar vertebra ([facility] [address] Essential hypertension, Other hyperlipidemia, and Other specified hypothyroidism. Social History:   reports that she has never smoked. She has never used smokeless tobacco. She reports previous alcohol use. She reports that she does not use drugs. Family History:   Family History   Problem Relation Age of Onset    No Known Problems Mother     No Known Problems Father        Vitals:  BP (!) 157/85   Pulse 97   Temp 98.5 °F (36.9 °C) (Oral)   Resp 17   Ht 5' (1.524 m)   Wt 107 lb 6.4 oz (48.7 kg)   SpO2 97%   BMI 20.98 kg/m²   Temp (24hrs), Av.2 °F (36.8 °C), Min:97.8 °F (36.6 °C), Max:98.7 °F (37.1 °C)    No results for input(s): POCGLU in the last 72 hours. I/O (24Hr):     Intake/Output Summary (Last 24 hours) at 2021 0928  Last data filed at 2021 0925  Gross per 24 hour   Intake 963 ml   Output 75 ml   Net 888 ml       Labs:  Hematology:  Recent Labs     21  0535 21  0554 21  0508   WBC 13.4* 10.0 9.5   RBC 3.00* 3.01* 2.90*   HGB 8.5* 8.5* 8.2*   HCT 28.2* 27.8* 27.1*   MCV 94.0 92.4 93.4   MCH 28.3 28.2 28.3   MCHC 30.1 30.6 30.3   RDW 15.1* 14.6* 14.0    278 291   MPV 9.6 9.7 9.5     Chemistry:  Recent Labs     21  0535 21  0554 21  0508    140 140   K 4.3 3.7 3.7   * 106 103   CO2 19* 24 26   GLUCOSE 99 96 87   BUN 35* 33* 34*   CREATININE 2.13* 2.18* 1.91*   ANIONGAP 13 10 11   LABGLOM 23* 22* 26* GFRAA 27* 27* 31*   CALCIUM 8.5* 8.4* 8.3*   CAION 1.18  --   --    PHOS 3.2 3.8 3.9     Recent Labs     06/07/21  0535 06/08/21  0554 06/09/21  0508   PROT 6.5 6.2* 5.9*   LABALBU 2.4* 2.4* 2.3*   AST 27 22 18   ALT <5* <5* <5*   ALKPHOS 58 54 51   BILITOT 0.31 0.25* 0.21*     ABG:  Lab Results   Component Value Date    PHART 7.374 06/03/2021    OZV6HDP 38.5 06/03/2021    PO2ART 244.0 06/03/2021    UUN0NVG 21.9 06/03/2021    NBEA 2.4 06/03/2021    PBEA NOT REPORTED 06/03/2021    P5QIMRRF 99.6 06/03/2021    FIO2 57% 06/03/2021     Lab Results   Component Value Date/Time    SPECIAL NOT REPORTED 06/03/2021 06:19 PM     Lab Results   Component Value Date/Time    CULTURE NO GROWTH 06/03/2021 06:19 PM       Radiology:  CT LUMBAR SPINE WO CONTRAST    Result Date: 6/4/2021  Postoperative changes with posterior instrumentation from T12-L4. There is dian system bilateral pleural effusions with some atelectasis now present in the left lower lobe. US RENAL COMPLETE    Result Date: 6/7/2021  Slightly increased cortical echogenicity suggesting chronic medical renal disease. Otherwise negative renal and bladder ultrasound. XR CHEST PORTABLE    Result Date: 6/7/2021  Mild left lower lobe retrocardiac atelectasis/airspace disease with a small left pleural effusion.      Path- (+) plasmacytoma    Physical Examination:        General appearance:  alert, cooperative and no distress  Mental Status:  oriented to person, place and time and normal affect  Lungs:  clear to auscultation bilaterally, normal effort  Heart:  regular rate and rhythm, no murmur  Abdomen:  soft, nontender, nondistended, normal bowel sounds, no masses, hepatomegaly, splenomegaly  Extremities: trace edema bilat feet, no redness, tenderness in the calves  Skin:  no gross lesions, rashes, induration    Assessment:        Hospital Problems         Last Modified POA    * (Principal) Multiple myeloma not having achieved remission ([address] 6/1/2021 Yes    Acute

## 2021-06-09 NOTE — DISCHARGE SUMMARY
[facility]  Office: [address], DO, Kwame Petty, DO, Hermes Allen, DO, Augustus Campbell, DO, Zaheer Long MD, Jose Herrmann MD, Zaheer Bonds MD, Courtney Cardoza MD, Carrie Reynaga MD, Slim Vaughn MD, Lamine Martin MD, Isai Couch MD, Guanako Flores, DO, Tom Rivas MD, Regina Woods DO, Eden Mckeon MD,  Ravi Aragon DO, Octaviano Knott MD, Derald Jeans, MD, Nicolas Perez MD, Maddie Zamora MD, Birdie Mabry, [facility], [facility], [facility], Jim Garcia, CNP, Lemuel Hill, CNS, Brandy Mendoza, CNP, Christian Jose, CNP, An Phan, CNP, Radha Hunt, CNP, Mario Dorman, CNP, Dashawn Arce PA-C, Chandra Berumen, [facility], Nathalie Womack, CNP, Renata Handley, CNP, Aaron Magallon, CNP, Antoine Holter, CNP, Devon Barlow, CNP, Davidheather [facility], [address] [place] [address]    Discharge Summary     Patient ID: Kalina Chaudhary  :  1945   MRN: [id number]     ACCOUNT:  [de-identified]   Patient's PCP: No primary care provider on file.   Admit Date: 2021   Discharge Date: 6/10/21     Length of Stay: 14  Code Status:  Full Code  Admitting Physician: Franco Salguero DO  Discharge Physician: Keri Gilford, MD     Active Discharge Diagnoses:     Hospital Problem Lists:  Principal Problem:    Multiple myeloma not having achieved remission [facility])  Active Problems:    Acute renal failure (ARF) ([facility] [address]    Compression fracture of lumbar vertebra (HCC)    Essential hypertension    Other hyperlipidemia    Subclinical hypothyroidism    Pathologic lumbar vertebral fracture    Normocytic normochromic anemia    Moderate protein-calorie malnutrition (HCC)    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 *      Admission Condition:  poor     Discharged Condition: fair    Hospital Stay:     Hospital Course:  Abdoulaye Price is a 77 y.o. female who was admitted for the management of   Multiple myeloma not having achieved remission ([address] , presented to ER with back pain. Per my partner:  Laura Dove documented in the medical record: \"This is a 67-year-old female who initially presented to the hospital with complaints of increasing back pain.  Initial MRI did show burst L2 fracture with 6 mm retropulsion of the spinal canal with moderate canal narrowing along with lucency in the right side.  Patient was transferred for neurosurgery evaluation.  MRI was concerning for pathologic fracture.  IgA level 2250, kappa lambda ratio 0.01 and of work-up pending. Pt seen by Neurosurgery and went to OR 6/3/2021 for an L2 laminectomy and partial L3 laminectomy and T12-L4 fusion.      The plan has included:  1. Pathologic fracture of lumbar Vertebra: Neurosurgery consulted, plan for OR early next week. Continue PT/OT. TLSO brace ordered. Hold ASA,  2. Workup for MM in progress. Kappa/Lambda 0.97/136. Immunoglobulin panel: IGA: 2250. Oncology consulted, + tissue diagnosis, pt will need Radiation therapy within 4 weeks and then chemotherapy. 3. Protein calorie malnutrition: liberalize diet, High calorie supplement. 4. Nephrotic range proteinuria: increase norvasc 10 mg . Add ACE/ARB when AMERICO resolves  5. Monitor Hgb, transfuse prn for symptomatic anemia or Hgb <7  6. Continue Lipitor 20 mg daily. 7. Continue DVT ppx.  8. Continue Synthroid 50 mcg daily.   Risk stratification: patient is low/intermediate risk for surgery.      On 6/3 the patient underwent:  Procedure(s):  L2 LAMINECTOMY, LEFT MEDIAL FACETECTOMY, LEFT L1 MEDIAL FACETECTOMY, LEFT L2 FORAMINOTOMY, PARITAL L3 LAMINECTOMY, T12-L4 INSTRUMENTED FUSION  BIOPSY OF T2 TUMOR  Surgeon(s):  Rigo Christianson, DO     Postop the patient's hemoglobin dropped to 6.3  One unit of packed red blood cells transfused \"    Her Cr stabilized  1.9-2.1, repeat BMP in 3 days. Nephrology was following the patient. Lisinopril stopped, con't Norvasc. She can take Percocet for pain prn and a stool regimen to prevent constipation. Follow up with Dr. Deanna Saucedo (rad onc) and Oncology in 2 weeks. Pt is stable for discharge to [facility] rehab. Significant therapeutic interventions: see above    Significant Diagnostic Studies:   Labs / Micro:  Cr. 2.17   Hb 8.2 on day of discharge    Radiology:  XR LUMBAR SPINE (2-3 VIEWS)    Result Date: 6/8/2021  T12-L4 posterior spinal fusion in unchanged alignment. No evidence of hardware complication. Compression fracture of T2 not appreciably changed. CT LUMBAR SPINE WO CONTRAST    Result Date: 6/4/2021  Postoperative changes with posterior instrumentation from T12-L4. There is dian system bilateral pleural effusions with some atelectasis now present in the left lower lobe. US RENAL COMPLETE    Result Date: 6/7/2021  Slightly increased cortical echogenicity suggesting chronic medical renal disease. Otherwise negative renal and bladder ultrasound. XR CHEST PORTABLE    Result Date: 6/7/2021  Mild left lower lobe retrocardiac atelectasis/airspace disease with a small left pleural effusion. Consultations:    Consults:     Final Specialist Recommendations/Findings:   IP CONSULT TO NEUROSURGERY  IP CONSULT TO HEM/ONC  IP CONSULT TO DIETITIAN  INPATIENT CONSULT TO ORTHOTIST/PROSTHETIST  IP CONSULT TO NEPHROLOGY  IP CONSULT TO IV TEAM  IP CONSULT TO PHYSICAL MEDICINE REHAB      The patient was seen and examined on day of discharge and this discharge summary is in conjunction with any daily progress note from day of discharge.     Discharge plan:     Disposition: Acute rehab at [facility] rehab    Physician Follow Up:     Keya Prater, [address] [facility] [address] [phone]    Schedule an appointment as soon from any pharmacy    Bring a paper prescription for each of these medications  · oxyCODONE-acetaminophen 5-325 MG per tablet         No discharge procedures on file. Time Spent on discharge is  32 mins in patient examination, evaluation, counseling as well as medication reconciliation, prescriptions for required medications, discharge plan and follow up. Electronically signed by   Cathy Dunn MD  6/9/2021  1:53 PM      Thank you Dr. Eloy Welch primary care provider on file. for the opportunity to be involved in this patient's care.

## 2021-06-09 NOTE — PLAN OF CARE
Problem: Skin Integrity:  Goal: Will show no infection signs and symptoms  Description: Will show no infection signs and symptoms  6/9/2021 1722 by Brayton Leyden, RN  Outcome: Ongoing  6/9/2021 0655 by Domenic Guillaume RN  Outcome: Ongoing  Goal: Absence of new skin breakdown  Description: Absence of new skin breakdown  6/9/2021 1722 by Brayton Leyden, RN  Outcome: Ongoing  6/9/2021 0655 by Domenic Guillaume RN  Outcome: Ongoing     Problem: Falls - Risk of:  Goal: Will remain free from falls  Description: Will remain free from falls  6/9/2021 1722 by Brayton Leyden, RN  Outcome: Ongoing  6/9/2021 0655 by Domenic Guillaume RN  Outcome: Ongoing  Goal: Absence of physical injury  Description: Absence of physical injury  6/9/2021 1722 by Brayton Leyden, RN  Outcome: Ongoing  6/9/2021 0655 by Domenic Guillaume RN  Outcome: Ongoing     Problem: Pain:  Goal: Pain level will decrease  Description: Pain level will decrease  6/9/2021 1722 by Brayton Leyden, RN  Outcome: Ongoing  6/9/2021 0655 by Domenic Guillaume RN  Outcome: Ongoing  Goal: Control of acute pain  Description: Control of acute pain  6/9/2021 1722 by Brayton Leyden, RN  Outcome: Ongoing  6/9/2021 0655 by Domenic Guillaume RN  Outcome: Ongoing  Goal: Control of chronic pain  Description: Control of chronic pain  6/9/2021 1722 by Brayton Leyden, RN  Outcome: Ongoing  6/9/2021 0655 by Domenic Guillaume RN  Outcome: Ongoing     Problem: Nutrition  Goal: Optimal nutrition therapy  6/9/2021 1722 by Brayton Leyden, RN  Outcome: Ongoing  6/9/2021 0655 by Domenic Guillaume RN  Outcome: Ongoing

## 2021-06-09 NOTE — PROGRESS NOTES
Physical Therapy  Facility/Department: [facility] NEURO  Daily Treatment Note  NAME: Larry Vu  : 1945  MRN: [id number]    Date of Service: 2021    Discharge Recommendations:  Patient would benefit from continued therapy after discharge   PT Equipment Recommendations  Equipment Needed: No  Mobility Devices: Jaziel Jorge: Rolling    Assessment   Body structures, Functions, Activity limitations: Decreased functional mobility ; Decreased ADL status; Decreased ROM; Decreased strength;Decreased endurance;Decreased balance; Increased pain  Assessment: Pt ambulated 150ft with RW and CGA and 150ft no AD CGA , min A to don/doff TLSO and Min A for bed mobility. Pt is limited due to increased pain with mobility and increased fatigue Pt could benefit from continued PT in order to improve functional mobility and ambulation. Pt currently requires 24 hour care to complete functional tasks. Prognosis: Good  REQUIRES PT FOLLOW UP: Yes  Activity Tolerance  Activity Tolerance: Patient limited by endurance; Patient Tolerated treatment well;Patient limited by pain; Patient limited by fatigue     Patient Diagnosis(es): The primary encounter diagnosis was Compression fracture of L2 vertebra, initial encounter ([facility] 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 has a past medical history of Acute renal failure (ARF) (HCC), Compression fracture of lumbar vertebra ([address] Essential hypertension, Other hyperlipidemia, and Other specified hypothyroidism. has a past surgical history that includes Finger trigger release (Left); back surgery (2021); and lumbar fusion (N/A, 6/3/2021).     Restrictions  Restrictions/Precautions  Restrictions/Precautions: Fall Risk  Required Braces or Orthoses?: Yes  Required Braces or Orthoses Spinal: Thoracic Lumbar Sacral Orthotics  Spinal Other: Per RN, TLSO only for comfort if pt. chooses to wear it. Pt. decliend to don TLSO. Position Activity Restriction  Other position/activity restrictions: posterior corpectomy with fusion lumbar spine 6/3  Subjective   General  Chart Reviewed: Yes  Response To Previous Treatment: Patient with no complaints from previous session. Family / Caregiver Present: No  Subjective  Subjective: RN and pt agreeable to PT. Pt supine in bed upon arrival and exit. Pt cooperative and pleasant throughout. General Comment  Comments: Pt retired supine in bed after session.   Pain Screening  Patient Currently in Pain: Yes  Pain Assessment  Pain Assessment: 0-10  Pain Level: 5  Patient's Stated Pain Goal: No pain  Pain Type: Surgical pain  Pain Location: Back  Pain Orientation: Lower  Pain Descriptors: Aching  Pain Frequency: Continuous  Pain Onset: On-going  Clinical Progression: Not changed  Functional Pain Assessment: Activities are not prevented  Non-Pharmaceutical Pain Intervention(s): Ambulation/Increased Activity  Response to Pain Intervention: Patient Satisfied  Vital Signs  Patient Currently in Pain: Yes       Orientation  Orientation  Overall Orientation Status: Within Normal Limits  Cognition      Objective   Bed mobility  Supine to Sit: Contact guard assistance  Sit to Supine: Contact guard assistance  Transfers  Sit to Stand: Stand by assistance  Stand to sit: Stand by assistance  Ambulation  Ambulation?: Yes  More Ambulation?: Yes  Ambulation 1  Surface: level tile  Device: Rolling Walker  Assistance: Contact guard assistance  Gait Deviations: Slow Jessica  Distance: 150ft  Ambulation 2  Surface - 2: level tile  Device 2: No device  Assistance 2: Contact guard assistance  Gait Deviations: Slow Jessica  Distance: 150ft  Stairs/Curb  Stairs?: Yes  Stairs  # Steps : 4  Stairs Height: 6\"  Rails: Left ascending  Device: No Device  Assistance: Contact guard assistance Balance  Posture: Good  Sitting - Static: Good  Sitting - Dynamic: Good  Standing - Static: Good;-  Standing - Dynamic: Fair;+  Comments: standing balance assessed with RW  Exercises  Seated LE exercise program: Long Arc Quads, hip abduction/adduction, heel/toe raises, and marches. Reps: x15  Goals  Short term goals  Time Frame for Short term goals: 14 visits  Short term goal 1: Ambulate 200ft with RW and supervision  Short term goal 2: Jair/Doff TLSO with Min A while sitting EOB  Short term goal 3: Ascend/Descend 4 stairs with left handrail and MIN A  Short term goal 4: Demo Good- dynamic standing balance  Short term goal 5: Demo log roll technique bed mobility independently    Plan    Plan  Times per week: 5-6x/wk  Current Treatment Recommendations: Strengthening, ROM, Balance Training, Functional Mobility Training, Transfer Training, ADL/Self-care Training, Stair training, Gait Training, Endurance Training, Home Exercise Program, Safety Education & Training, Patient/Caregiver Education & Training, Positioning  Safety Devices  Type of devices:  All fall risk precautions in place, Call light within reach, Gait belt, Patient at risk for falls, Left in bed, Nurse notified (pt left with family)  Restraints  Initially in place: No     Therapy Time   Individual Concurrent Group Co-treatment   Time In 0939         Time Out 1005         Minutes 26         Timed Code Treatment Minutes: [address] [facility], [facility]

## 2021-06-09 NOTE — CARE COORDINATION
Called Doris from [place], she informs me that they will not have any beds until at least Friday. Called Nasrin Chaudhary from [place] she also informs me that they have no beds available at this time. Family and patient are looking overs SNF ARU list at this time.   Patient has been discharged today at [phone] spoke to family and patient at bedside they have chose [facility] she will continue to look over SNF list    [address] [facility] to check on referral, left voicemail

## 2021-06-09 NOTE — PROGRESS NOTES
Neurosurgery MARIBELL/Resident    Daily Progress Note   No chief complaint on file. 6/9/2021  12:13 PM    Chart reviewed. No acute events overnight. No new complaints. Vitals:    06/09/21 0352 06/09/21 0800 06/09/21 0806 06/09/21 1130   BP: (!) 156/80  (!) 157/85 (!) 155/78   Pulse: 95 103 97 98   Resp: 19 17 23   Temp: 98.1 °F (36.7 °C)  98.5 °F (36.9 °C)    TempSrc: Oral  Oral    SpO2: 97%  97% 95%   Weight:       Height:           PE:   Awake, alert, NAD  Motor   L deltoid 5/5; R deltoid 5/5  L biceps 5/5; R biceps 5/5  L triceps 5/5; R triceps 5/5  L wrist extension 5/5; R wrist extension 5/5  L intrinsics 5/5; R intrinsics 5/5      L iliopsoas 5/5 , R iliopsoas 5/5  L quadriceps 5/5; R quadriceps 5/5  L Dorsiflexion 5/5; R dorsiflexion 5/5  L Plantarflexion 5/5; R plantarflexion 5/5  L EHL 5/5; R EHL 5/5    Incision c/d/i      Lab Results   Component Value Date    WBC 9.5 06/09/2021    HGB 8.2 (L) 06/09/2021    HCT 27.1 (L) 06/09/2021     06/09/2021    ALT <5 (L) 06/09/2021    AST 18 06/09/2021     06/09/2021    K 3.7 06/09/2021     06/09/2021    CREATININE 1.91 (H) 06/09/2021    BUN 34 (H) 06/09/2021    CO2 26 06/09/2021    TSH 7.01 (H) 05/27/2021    INR 1.2 05/27/2021       Radiology   XR LUMBAR SPINE (2-3 VIEWS)    Result Date: 6/8/2021  EXAMINATION: THREE XRAY VIEWS OF THE LUMBAR SPINE 6/8/2021 11:20 am COMPARISON: CT June 4, 2021 HISTORY: ORDERING SYSTEM PROVIDED HISTORY: Compression fracture of T2 TECHNOLOGIST PROVIDED HISTORY: f/u post op standing upright Reason for Exam: uprt weightbearing FINDINGS: Status post T12-L4 posterior spinal fusion with L2 laminectomy. L2 compression fracture appears unchanged. Lumbar lordosis remains within normal limits. Hardware appears intact. Severe disc height loss redemonstrated at L5-S1 hypertrophic facet changes noted at L4-5 and L5-S1. T12-L4 posterior spinal fusion in unchanged alignment. No evidence of hardware complication. Compression fracture of T2 not appreciably changed. A/P  77 y.o. female who presents with L2 pathologic fracture with cord compression   POD#6 s/p L2 and L3 laminectomy and decompression, T12-L4 instrumented fusion     - post op films reviewed   - activity as tolerated, PT and OT  - follow up 2 weeks post op for incision site check   - ok to leave incision site open to air  - will need radiation 4 weeks post op after incision site healed-Dr Ruth Aguirre notified of patient to assist in follow up  - ok to discharge to rehab      Please contact neurosurgery with any changes in patients neurologic status.        NATE Gill   12:13 PM EDT

## 2021-06-10 VITALS
RESPIRATION RATE: 15 BRPM | BODY MASS INDEX: 21.09 KG/M2 | DIASTOLIC BLOOD PRESSURE: 61 MMHG | OXYGEN SATURATION: 92 % | TEMPERATURE: 98.3 F | WEIGHT: 107.4 LBS | HEART RATE: 92 BPM | HEIGHT: 60 IN | SYSTOLIC BLOOD PRESSURE: 132 MMHG

## 2021-06-10 LAB
ALBUMIN SERPL-MCNC: 2.3 G/DL (ref 3.5–5.2)
ALBUMIN/GLOBULIN RATIO: 0.6 (ref 1–2.5)
ALP BLD-CCNC: 54 U/L (ref 35–104)
ALT SERPL-CCNC: <5 U/L (ref 5–33)
ANION GAP SERPL CALCULATED.3IONS-SCNC: 10 MMOL/L (ref 9–17)
AST SERPL-CCNC: 18 U/L
BILIRUB SERPL-MCNC: <0.1 MG/DL (ref 0.3–1.2)
BUN BLDV-MCNC: 36 MG/DL (ref 8–23)
CALCIUM SERPL-MCNC: 8.4 MG/DL (ref 8.6–10.4)
CHLORIDE BLD-SCNC: 103 MMOL/L (ref 98–107)
CO2: 23 MMOL/L (ref 20–31)
CREAT SERPL-MCNC: 2.17 MG/DL (ref 0.5–0.9)
GFR AFRICAN AMERICAN: 27 ML/MIN
GFR NON-AFRICAN AMERICAN: 22 ML/MIN
GFR SERPL CREATININE-BSD FRML MDRD: ABNORMAL ML/MIN/{1.73_M2}
GFR SERPL CREATININE-BSD FRML MDRD: ABNORMAL ML/MIN/{1.73_M2}
GLUCOSE BLD-MCNC: 103 MG/DL (ref 70–99)
HCT VFR BLD CALC: 26.6 % (ref 36.3–47.1)
HEMOGLOBIN: 8.2 G/DL (ref 11.9–15.1)
MCH RBC QN AUTO: 28.6 PG (ref 25.2–33.5)
MCHC RBC AUTO-ENTMCNC: 30.8 G/DL (ref 28.4–34.8)
MCV RBC AUTO: 92.7 FL (ref 82.6–102.9)
NRBC AUTOMATED: 0 PER 100 WBC
PDW BLD-RTO: 14 % (ref 11.8–14.4)
PHOSPHORUS: 4.1 MG/DL (ref 2.6–4.5)
PLATELET # BLD: 318 K/UL (ref 138–453)
PMV BLD AUTO: 9.2 FL (ref 8.1–13.5)
POTASSIUM SERPL-SCNC: 3.8 MMOL/L (ref 3.7–5.3)
RBC # BLD: 2.87 M/UL (ref 3.95–5.11)
SODIUM BLD-SCNC: 136 MMOL/L (ref 135–144)
TOTAL PROTEIN: 6 G/DL (ref 6.4–8.3)
WBC # BLD: 10.8 K/UL (ref 3.5–11.3)

## 2021-06-10 PROCEDURE — 97530 THERAPEUTIC ACTIVITIES: CPT

## 2021-06-10 PROCEDURE — 2580000003 HC RX 258: Performed by: STUDENT IN AN ORGANIZED HEALTH CARE EDUCATION/TRAINING PROGRAM

## 2021-06-10 PROCEDURE — 85027 COMPLETE CBC AUTOMATED: CPT

## 2021-06-10 PROCEDURE — 99232 SBSQ HOSP IP/OBS MODERATE 35: CPT | Performed by: INTERNAL MEDICINE

## 2021-06-10 PROCEDURE — 6370000000 HC RX 637 (ALT 250 FOR IP): Performed by: STUDENT IN AN ORGANIZED HEALTH CARE EDUCATION/TRAINING PROGRAM

## 2021-06-10 PROCEDURE — 6370000000 HC RX 637 (ALT 250 FOR IP): Performed by: REGISTERED NURSE

## 2021-06-10 PROCEDURE — 80053 COMPREHEN METABOLIC PANEL: CPT

## 2021-06-10 PROCEDURE — 84100 ASSAY OF PHOSPHORUS: CPT

## 2021-06-10 PROCEDURE — 97110 THERAPEUTIC EXERCISES: CPT

## 2021-06-10 PROCEDURE — 99239 HOSP IP/OBS DSCHRG MGMT >30: CPT | Performed by: INTERNAL MEDICINE

## 2021-06-10 PROCEDURE — 36415 COLL VENOUS BLD VENIPUNCTURE: CPT

## 2021-06-10 PROCEDURE — APPSS30 APP SPLIT SHARED TIME 16-30 MINUTES: Performed by: PHYSICIAN ASSISTANT

## 2021-06-10 PROCEDURE — 6360000002 HC RX W HCPCS: Performed by: INTERNAL MEDICINE

## 2021-06-10 RX ADMIN — HEPARIN SODIUM 5000 UNITS: 5000 INJECTION INTRAVENOUS; SUBCUTANEOUS at 08:54

## 2021-06-10 RX ADMIN — LEVOTHYROXINE SODIUM 50 MCG: 50 TABLET ORAL at 08:53

## 2021-06-10 RX ADMIN — OXYCODONE HYDROCHLORIDE AND ACETAMINOPHEN 2 TABLET: 5; 325 TABLET ORAL at 11:01

## 2021-06-10 RX ADMIN — POLYETHYLENE GLYCOL 3350 17 G: 17 POWDER, FOR SOLUTION ORAL at 08:53

## 2021-06-10 RX ADMIN — AMLODIPINE BESYLATE 10 MG: 10 TABLET ORAL at 08:53

## 2021-06-10 RX ADMIN — OXYCODONE HYDROCHLORIDE AND ACETAMINOPHEN 1 TABLET: 5; 325 TABLET ORAL at 03:14

## 2021-06-10 RX ADMIN — DOCUSATE SODIUM 200 MG: 100 CAPSULE ORAL at 08:53

## 2021-06-10 RX ADMIN — METHOCARBAMOL TABLETS 500 MG: 500 TABLET, COATED ORAL at 06:08

## 2021-06-10 RX ADMIN — METHOCARBAMOL TABLETS 500 MG: 500 TABLET, COATED ORAL at 13:38

## 2021-06-10 RX ADMIN — DOCUSATE SODIUM 50MG AND SENNOSIDES 8.6MG 2 TABLET: 8.6; 5 TABLET, FILM COATED ORAL at 08:53

## 2021-06-10 RX ADMIN — SODIUM CHLORIDE, PRESERVATIVE FREE 10 ML: 5 INJECTION INTRAVENOUS at 08:54

## 2021-06-10 ASSESSMENT — PAIN DESCRIPTION - ORIENTATION
ORIENTATION: LOWER
ORIENTATION: RIGHT;LEFT;POSTERIOR

## 2021-06-10 ASSESSMENT — PAIN DESCRIPTION - ONSET: ONSET: ON-GOING

## 2021-06-10 ASSESSMENT — PAIN DESCRIPTION - DESCRIPTORS
DESCRIPTORS: ACHING
DESCRIPTORS: ACHING;DISCOMFORT

## 2021-06-10 ASSESSMENT — PAIN DESCRIPTION - PAIN TYPE
TYPE: ACUTE PAIN
TYPE: SURGICAL PAIN

## 2021-06-10 ASSESSMENT — PAIN DESCRIPTION - LOCATION
LOCATION: BACK
LOCATION: BACK

## 2021-06-10 ASSESSMENT — PAIN SCALES - GENERAL
PAINLEVEL_OUTOF10: 8
PAINLEVEL_OUTOF10: 7
PAINLEVEL_OUTOF10: 2
PAINLEVEL_OUTOF10: 6

## 2021-06-10 ASSESSMENT — PAIN DESCRIPTION - FREQUENCY
FREQUENCY: CONTINUOUS
FREQUENCY: CONTINUOUS

## 2021-06-10 NOTE — PROGRESS NOTES
Restrictions/Precautions  Restrictions/Precautions: Fall Risk  Required Braces or Orthoses?: Yes  Required Braces or Orthoses  Spinal: Thoracic Lumbar Sacral Orthotics  Spinal Other: Per RN, TLSO only for comfort if pt. chooses to wear it. Pt. decliend to don TLSO. Position Activity Restriction  Other position/activity restrictions: posterior corpectomy with fusion lumbar spine 6/3  Subjective   General  Response To Previous Treatment: Patient with no complaints from previous session. Family / Caregiver Present: No  Subjective  Subjective: RN and pt agreeable to PT. pt agreeable and pleasant. Pt R sidelying in bed at start of session. Pain Screening  Patient Currently in Pain: Yes  Pain Assessment  Pain Assessment: 0-10  Pain Level: 8  Pain Type: Acute pain  Pain Location: Back  Pain Orientation: Right;Left;Posterior  Pain Descriptors: Aching;Discomfort  Pain Frequency: Continuous  Pain Onset: On-going  Non-Pharmaceutical Pain Intervention(s): Repositioned; Ambulation/Increased Activity  Response to Pain Intervention: Patient Satisfied  Vital Signs  Patient Currently in Pain: Yes       Orientation  Orientation  Overall Orientation Status: Within Normal Limits  Cognition   Cognition  Overall Cognitive Status: WFL  Objective   Bed mobility  Supine to Sit: Contact guard assistance  Sit to Supine: Contact guard assistance  Comment: Increased time and effort for task, pt utilizes log roll technique to reduce pain.  HOB elevated  Transfers  Sit to Stand: Stand by assistance  Stand to sit: Stand by assistance  Ambulation  Ambulation?: Yes  More Ambulation?: Yes  Ambulation 1  Surface: level tile  Device: Rolling Walker  Assistance: Contact guard assistance  Gait Deviations: Slow Jessica  Distance: 300 + 40 ft  Stairs/Curb  Stairs?: Yes  Stairs  # Steps : 4  Stairs Height: 6\"  Rails: Left ascending  Device: No Device  Assistance: Contact guard assistance  Comment: Heavy use of HR, step to pattern     Balance  Posture: Good

## 2021-06-10 NOTE — PROGRESS NOTES
Nephrology Progress Note      SUBJECTIVE       Pt was seen and examined. No acute issues overnite. Stable hemodynamics . Appetite still suboptimal. Urine output decent. Blood pressure stable. Plans for discharge today to [facility] rehab noted. Creatinine fluctuating between 1.9-2.2    OBJECTIVE      CURRENT TEMPERATURE:  Temp: 98.3 °F (36.8 °C)  MAXIMUM TEMPERATURE OVER 24HRS:  Temp (24hrs), Av.4 °F (36.9 °C), Min:97.7 °F (36.5 °C), Max:98.7 °F (37.1 °C)    CURRENT RESPIRATORY RATE:  Resp: 15  CURRENT PULSE:  Pulse: 92  CURRENT BLOOD PRESSURE:  BP: 132/61  24HR BLOOD PRESSURE RANGE:  Systolic (77YCO), QQU:859 , Min:120 , JMP:986   ; Diastolic (71CHV), GEA:99, Min:51, Max:100    24HR INTAKE/OUTPUT:      Intake/Output Summary (Last 24 hours) at 6/10/2021 1212  Last data filed at 6/10/2021 9843  Gross per 24 hour   Intake 725 ml   Output 100 ml   Net 625 ml     WEIGHT :  Patient Vitals for the past 96 hrs (Last 3 readings):   Weight   21 0548 107 lb 6.4 oz (48.7 kg)     PHYSICAL EXAM      GENERAL APPEARANCE:Awake, alert, in no acute distress  SKIN: warm and dry, no rash or erythema  EYES: conjunctivae pale and sclera anicteric  ENT: no thrush no pharyngeal congestion   NECK:   No JVD. No carotid bruits and no carotid lymphadenopathy . PULMONARY: Diminished breath sounds auscultation. No Wheezing, no ronchi . CADRDIOVASCULAR: S1 and S2 normal NO S3 and NO S4 . No rubs , no murmur. ABDOMEN: soft nontender, bowel sounds present, no organomegaly, no ascites.        EXTREMITIES: + edema     CURRENT MEDICATIONS      oxyCODONE-acetaminophen (PERCOCET) 5-325 MG per tablet 1 tablet, Q4H PRN   Or  oxyCODONE-acetaminophen (PERCOCET) 5-325 MG per tablet 2 tablet, Q4H PRN  bisacodyl (DULCOLAX) suppository 10 mg, Once  HYDROmorphone (DILAUDID) injection 0.5 mg, Q3H PRN   Or  HYDROmorphone (DILAUDID) injection 1 mg, Q3H PRN  heparin (porcine) injection 5,000 Units, BID  0.9 % sodium chloride infusion, PRN  ondansetron (ZOFRAN) injection 4 mg, Q6H PRN  methocarbamol (ROBAXIN) tablet 500 mg, Q8H  magnesium hydroxide (MILK OF MAGNESIA) 400 MG/5ML suspension 30 mL, Daily PRN  bisacodyl (DULCOLAX) suppository 10 mg, Daily PRN  metoprolol (LOPRESSOR) injection 5 mg, Q6H PRN  polyethylene glycol (GLYCOLAX) packet 17 g, Daily  amLODIPine (NORVASC) tablet 10 mg, Daily  docusate sodium (COLACE) capsule 200 mg, BID  sennosides-docusate sodium (SENOKOT-S) 8.6-50 MG tablet 2 tablet, Daily  sodium chloride flush 0.9 % injection 5-40 mL, 2 times per day  sodium chloride flush 0.9 % injection 5-40 mL, PRN  0.9 % sodium chloride infusion, PRN  nicotine (NICODERM CQ) 21 MG/24HR 1 patch, Daily PRN  acetaminophen (TYLENOL) tablet 650 mg, Q6H PRN   Or  acetaminophen (TYLENOL) suppository 650 mg, Q6H PRN  latanoprost (XALATAN) 0.005 % ophthalmic solution 1 drop, Nightly  levothyroxine (SYNTHROID) tablet 50 mcg, Daily  atorvastatin (LIPITOR) tablet 20 mg, Daily  sodium chloride flush 0.9 % injection 10 mL, PRN          LABS      CBC:   Recent Labs     06/08/21  0554 06/09/21  0508 06/10/21  0515   WBC 10.0 9.5 10.8   RBC 3.01* 2.90* 2.87*   HGB 8.5* 8.2* 8.2*   HCT 27.8* 27.1* 26.6*   MCV 92.4 93.4 92.7   MCH 28.2 28.3 28.6   MCHC 30.6 30.3 30.8   RDW 14.6* 14.0 14.0    291 318   MPV 9.7 9.5 9.2      BMP:   Recent Labs     06/08/21  0554 06/09/21  0508 06/10/21  0515    140 136   K 3.7 3.7 3.8    103 103   CO2 24 26 23   BUN 33* 34* 36*   CREATININE 2.18* 1.91* 2.17*   GLUCOSE 96 87 103*   CALCIUM 8.4* 8.3* 8.4*      PHOSPHORUS:    Recent Labs     06/08/21  0554 06/09/21  0508 06/10/21  0515   PHOS 3.8 3.9 4.1     ALBUMIN:   Recent Labs     06/08/21  0554 06/09/21  0508 06/10/21  0515   LABALBU 2.4* 2.3* 2.3*     IRON:    Lab Results   Component Value Date    IRON 75 05/27/2021     IRON SATURATION:    Lab Results   Component Value Date    LABIRON 35 05/27/2021     TIBC:    Lab Results   Component Value Date    TIBC 215 05/27/2021 She is s/p L2-3 laminectomy , facetotomy and T12- L4 fusion yesterday  8. Anemia       PLAN      Avoid nephrotoxins  Follow-up with me in 4 to 6 weeks time        Please do not hesitate to call with questions.     This note is created with the assistance of a speech-recognition program. While intending to generate a document that actually reflects the content of the visit, no guarantees can be provided that every mistake has been identified and corrected by editing    Electronically signed by Brandt Mccrary MD on 6/10/2021 at 12:12 PM

## 2021-06-10 NOTE — PROGRESS NOTES
Infusions:    sodium chloride      sodium chloride       PRN Meds: oxyCODONE-acetaminophen **OR** oxyCODONE-acetaminophen, HYDROmorphone **OR** HYDROmorphone, sodium chloride, ondansetron, magnesium hydroxide, bisacodyl, metoprolol, sodium chloride flush, sodium chloride, nicotine, acetaminophen **OR** acetaminophen, sodium chloride flush    Data:     Past Medical History:   has a past medical history of Acute renal failure (ARF) ([facility] Compression fracture of lumbar vertebra ([facility] Essential hypertension, Other hyperlipidemia, and Other specified hypothyroidism. Social History:   reports that she has never smoked. She has never used smokeless tobacco. She reports previous alcohol use. She reports that she does not use drugs. Family History:   Family History   Problem Relation Age of Onset    No Known Problems Mother     No Known Problems Father        Vitals:  BP (!) 143/100   Pulse 85   Temp 97.7 °F (36.5 °C)   Resp 20   Ht 5' (1.524 m)   Wt 107 lb 6.4 oz (48.7 kg)   SpO2 93%   BMI 20.98 kg/m²   Temp (24hrs), Av.4 °F (36.9 °C), Min:97.7 °F (36.5 °C), Max:98.7 °F (37.1 °C)    No results for input(s): POCGLU in the last 72 hours. I/O (24Hr):     Intake/Output Summary (Last 24 hours) at 6/10/2021 0739  Last data filed at 2021 1500  Gross per 24 hour   Intake 720 ml   Output 250 ml   Net 470 ml       Labs:  Hematology:  Recent Labs     21  0554 21  0508 06/10/21  0515   WBC 10.0 9.5 10.8   RBC 3.01* 2.90* 2.87*   HGB 8.5* 8.2* 8.2*   HCT 27.8* 27.1* 26.6*   MCV 92.4 93.4 92.7   MCH 28.2 28.3 28.6   MCHC 30.6 30.3 30.8   RDW 14.6* 14.0 14.0    291 318   MPV 9.7 9.5 9.2     Chemistry:  Recent Labs     21  0554 21  0508 06/10/21  0515    140 136   K 3.7 3.7 3.8    103 103   CO2 24 26 23   GLUCOSE 96 87 103*   BUN 33* 34* 36*   CREATININE 2.18* 1.91* 2.17*   ANIONGAP 10 11 10   LABGLOM 22* 26* 22*   GFRAA 27* 31* 27*   CALCIUM 8.4* 8.3* 8.4*   PHOS 3.8 3.9 4.1     Recent Labs     06/08/21  0554 06/09/21  0508 06/10/21  0515   PROT 6.2* 5.9* 6.0*   LABALBU 2.4* 2.3* 2.3*   AST 22 18 18   ALT <5* <5* <5*   ALKPHOS 54 51 54   BILITOT 0.25* 0.21* <0.10*     ABG:  Lab Results   Component Value Date    PHART 7.374 06/03/2021    VCQ8VMK 38.5 06/03/2021    PO2ART 244.0 06/03/2021    PXP1IWV 21.9 06/03/2021    NBEA 2.4 06/03/2021    PBEA NOT REPORTED 06/03/2021    C1EFXWED 99.6 06/03/2021    FIO2 57% 06/03/2021     Lab Results   Component Value Date/Time    SPECIAL NOT REPORTED 06/03/2021 06:19 PM     Lab Results   Component Value Date/Time    CULTURE NO GROWTH 06/03/2021 06:19 PM       Radiology:  CT LUMBAR SPINE WO CONTRAST    Result Date: 6/4/2021  Postoperative changes with posterior instrumentation from T12-L4. There is dian system bilateral pleural effusions with some atelectasis now present in the left lower lobe. US RENAL COMPLETE    Result Date: 6/7/2021  Slightly increased cortical echogenicity suggesting chronic medical renal disease. Otherwise negative renal and bladder ultrasound. XR CHEST PORTABLE    Result Date: 6/7/2021  Mild left lower lobe retrocardiac atelectasis/airspace disease with a small left pleural effusion.      Path- (+) plasmacytoma    Physical Examination:        General appearance:  alert, cooperative and no distress  Mental Status:  oriented to person, place and time and normal affect  Lungs:  clear to auscultation bilaterally, normal effort  Heart:  regular rate and rhythm, no murmur  Abdomen:  soft, nontender, nondistended, normal bowel sounds, no masses, hepatomegaly, splenomegaly  Extremities: trace edema bilat feet, no redness, tenderness in the calves  Skin:  no gross lesions, rashes, induration    Assessment:        Hospital Problems         Last Modified POA    * (Principal) Multiple myeloma not having achieved remission ([address] 6/1/2021 Yes    Acute renal failure (ARF) ([address] 5/27/2021 Yes    Compression fracture of lumbar vertebra ([facility] 6/1/2021 Yes    Essential hypertension 5/27/2021 Yes    Other hyperlipidemia 5/27/2021 Yes    Subclinical hypothyroidism 5/27/2021 Yes    Pathologic lumbar vertebral fracture 5/29/2021 Yes    Normocytic normochromic anemia 5/27/2021 Yes    Moderate protein-calorie malnutrition ([address] 5/28/2021 Yes    Spinal cord compression due to malignant neoplasm metastatic to spine ([facility] 5/29/2021 Yes    BMI less than 19,adult 6/1/2021 Yes    Hypokalemia 6/3/2021 Yes    Left ventricular diastolic dysfunction 2/6/0632 Yes    Postoperative pain 6/4/2021 Yes    Acute blood loss as cause of postoperative anemia 6/5/2021 Yes    Atelectasis 6/6/2021 Yes          Plan:        1. Multiple myeloma- + pathology, Hem/Onc following Radiation to be arranged by Dr Sudha Reis, pain control, needs radiation initiated within 4 weeks per NS recommendations , prn Percocet for pain. NS cleared for discharge  2. Acute renal failure- Nephrology following, avoid nephrotoxins, Cr stable, f/u repeat BMP in 1 week  3. HTN- BP mildly elevated, on Norvasc, Lisinopril held  4. Acute blood loss anemia post op- Hb stable  5. Gi/ DVT proph  6. PT/OT  7.  SW- dc planning to rehab today    MATTHEW signed, med red adjusted    Wellington Mathur MD  6/10/2021  7:39 AM

## 2021-06-10 NOTE — PROGRESS NOTES
Neurosurgery MARIBELL/Resident    Daily Progress Note   No chief complaint on file. 6/10/2021  12:17 PM    Chart reviewed. No acute events overnight. No new complaints. Vitals:    06/09/21 2314 06/10/21 0314 06/10/21 0805 06/10/21 1128   BP: (!) 120/51 (!) 143/100 (!) 154/72 132/61   Pulse: 85 85  92   Resp: 17 20  15   Temp: 98.7 °F (37.1 °C) 97.7 °F (36.5 °C) 98.2 °F (36.8 °C) 98.3 °F (36.8 °C)   TempSrc: Oral  Oral Oral   SpO2: 93%   92%   Weight:       Height:           PE:   AOx3   Motor   L deltoid 5/5; R deltoid 5/5  L biceps 5/5; R biceps 5/5  L triceps 5/5; R triceps 5/5  L wrist extension 5/5; R wrist extension 5/5  L intrinsics 5/5; R intrinsics 5/5      L iliopsoas 5/5 , R iliopsoas 5/5  L quadriceps 5/5; R quadriceps 5/5  L Dorsiflexion 5/5; R dorsiflexion 5/5  L Plantarflexion 5/5; R plantarflexion 5/5  L EHL 5/5; R EHL 5/5    Sensation intact     Incision c/d/i      Lab Results   Component Value Date    WBC 10.8 06/10/2021    HGB 8.2 (L) 06/10/2021    HCT 26.6 (L) 06/10/2021     06/10/2021    ALT <5 (L) 06/10/2021    AST 18 06/10/2021     06/10/2021    K 3.8 06/10/2021     06/10/2021    CREATININE 2.17 (H) 06/10/2021    BUN 36 (H) 06/10/2021    CO2 23 06/10/2021    TSH 7.01 (H) 05/27/2021    INR 1.2 05/27/2021       A/P  77 y. o. female who presents with L2 pathologic fracture with cord compression   POD#7 s/p L2 and L3 laminectomy and decompression, T12-L4 instrumented fusion    - activity as tolerated, PT and OT  - follow up 2 weeks post op for incision site check   - ok to leave incision site open to air  - will need radiation 4 weeks post op after incision site healed-Dr Richar Jaime notified of patient to assist in follow up  - ok to discharge to rehab      Please contact neurosurgery with any changes in patients neurologic status.        NATE Wall   12:17 PM EDT

## 2021-06-11 ENCOUNTER — TELEPHONE (OUTPATIENT)
Dept: RADIATION ONCOLOGY | Age: 76
End: 2021-06-11

## 2021-06-11 NOTE — TELEPHONE ENCOUNTER
Received email from Dr. Yolie Medrano inpatient consult was requested from [facility] Dr. Digna SULLIVAN. Patient was d/c to [facility] rehab and will need OP radiation tx's once healed from surgery. Patient has a post op neuro appointment scheduled for 6/28/21. Patient to be scheduled at [facility] closer to home patient lives in [place]. Patient was not consulted while inhouse will need consult scheduled.

## 2021-06-15 ENCOUNTER — TELEPHONE (OUTPATIENT)
Dept: RADIATION ONCOLOGY | Facility: MEDICAL CENTER | Age: 76
End: 2021-06-15

## 2021-06-15 NOTE — TELEPHONE ENCOUNTER
I called Dr Charles Many office [phone] and asked for referral order to be put in Epic. There is a note in patients chart from 6/8/2021 that she needs radiation to the L2 tumor. Patient to be seen by them on 6/28/2021.

## 2021-06-15 NOTE — TELEPHONE ENCOUNTER
I spoke with Mahogany Pope and mentioned if you need the referral placed to contact the office to place it. It was ordered inpatient but order was never placed just messages between Dr. Lazarus Gates and Aashish SULLIVAN.

## 2021-06-15 NOTE — TELEPHONE ENCOUNTER
I did not see a referral in the chart however Dr. Monique Schuster received text message from neuro PA when he was on vacation and reading notes they mention patient will need OP RT done.

## 2021-06-17 ENCOUNTER — TELEPHONE (OUTPATIENT)
Dept: ONCOLOGY | Age: 76
End: 2021-06-17

## 2021-06-17 NOTE — TELEPHONE ENCOUNTER
RECEIVED A MESSAGE FROM DR Melbourne Jeans STATING PT NEEDS A F/U IN 1 WEEK AND APPT WITH RAD/ONC. WRITER SPOKE TO PATIENT AND PT LIVES VERY FAR AWAY AND WOULD LIKE TO [place]. PT WILL CALL BACK.

## 2021-06-24 ENCOUNTER — TELEPHONE (OUTPATIENT)
Dept: ONCOLOGY | Age: 76
End: 2021-06-24

## 2021-07-06 ENCOUNTER — OFFICE VISIT (OUTPATIENT)
Dept: NEUROSURGERY | Age: 76
End: 2021-07-06

## 2021-07-06 VITALS
DIASTOLIC BLOOD PRESSURE: 74 MMHG | SYSTOLIC BLOOD PRESSURE: 138 MMHG | HEIGHT: 60 IN | OXYGEN SATURATION: 98 % | WEIGHT: 94 LBS | HEART RATE: 71 BPM | BODY MASS INDEX: 18.46 KG/M2

## 2021-07-06 DIAGNOSIS — C90.00 MULTIPLE MYELOMA NOT HAVING ACHIEVED REMISSION (HCC): ICD-10-CM

## 2021-07-06 DIAGNOSIS — Z98.1 S/P LUMBAR FUSION: ICD-10-CM

## 2021-07-06 DIAGNOSIS — M84.48XD PATHOLOGICAL FRACTURE OF LUMBAR VERTEBRA WITH ROUTINE HEALING, SUBSEQUENT ENCOUNTER: Primary | ICD-10-CM

## 2021-07-06 DIAGNOSIS — G95.29 SPINAL CORD COMPRESSION DUE TO MALIGNANT NEOPLASM METASTATIC TO SPINE (HCC): ICD-10-CM

## 2021-07-06 DIAGNOSIS — C79.51 SPINAL CORD COMPRESSION DUE TO MALIGNANT NEOPLASM METASTATIC TO SPINE (HCC): ICD-10-CM

## 2021-07-06 PROCEDURE — 99024 POSTOP FOLLOW-UP VISIT: CPT | Performed by: NURSE PRACTITIONER

## 2021-07-06 RX ORDER — METOPROLOL SUCCINATE 50 MG/1
50 TABLET, EXTENDED RELEASE ORAL DAILY
COMMUNITY
Start: 2021-06-19

## 2021-07-06 RX ORDER — POTASSIUM CHLORIDE 20 MEQ/1
TABLET, EXTENDED RELEASE ORAL
COMMUNITY
Start: 2021-06-18

## 2021-07-06 RX ORDER — AMLODIPINE BESYLATE 10 MG/1
10 TABLET ORAL DAILY
COMMUNITY
Start: 2021-06-16

## 2021-07-06 RX ORDER — OXYCODONE HYDROCHLORIDE AND ACETAMINOPHEN 5; 325 MG/1; MG/1
TABLET ORAL
COMMUNITY
Start: 2021-06-18

## 2021-07-06 RX ORDER — ATORVASTATIN CALCIUM 10 MG/1
10 TABLET, FILM COATED ORAL NIGHTLY
COMMUNITY
Start: 2021-06-16

## 2021-07-06 NOTE — PROGRESS NOTES
Didi Mcdonnell  [facility] [address] [facility] [address]  Dept: [phone]    Patient:  Kimberly Crum  YOB: 1945  Date: 7/6/21    The patient is a 77 y.o. female who presents today for consult of the following problems:     Chief Complaint   Patient presents with    Post-Op Check         HPI:     Kimberly Crum is a 77 y.o. female who presents to the office for post-op evaluation s/p L2 and L3 laminectomy, T12-L4 fusion for pathologic compression fracture secondary to multiple myeloma. Patient states that postoperative pain has been well controlled. Does still have some decrease sensation to bilateral anterior thighs stopping proximal to the knees, otherwise no numbness tingling or discomfort. States this has been present for quite some time. Did have somewhat complicated course following discharge from acute rehab facility at [facility], developed fever, vomiting the night after discharge and was subsequently readmitted to hospital in [facility] ER, was inpatient from June 19-22, subsequently moved to inpatient rehab until the 30th when she was discharged home. This is her first postoperative follow-up here. Reports that the incision is healing well, denies any current fevers, chills, discharge or drainage. Has had some issues with bowel movement, is working with PCP for better control of this. Patient denies any saddle anesthesia, no loss of bladder function. Was discharged home with home PT/OT, 2-3 times weekly. Does have appointment on Friday with both medical and radiation oncologists in [place]. Will be seeing Dr. Ayden Lofton (medical oncology), and Dr. Lucius Rehman (radiation oncology) in [place].     Per inpatient notes from Dr. Berkeley Frankel:  She needs radiation therapy to the L2 tumor no sooner than 2 weeks to allow wound to heal  Recommend getting radiation no later than 4-6 due to the significant epidural disease    Does continue to utilize TLSO brace as this does provide comfort when up and moving. Sensation intact  Strength overall 4+/5  Incision CDI    Date of surgery: 6/3/2021    Assessment and Plan:      1. Pathological fracture of lumbar vertebra with routine healing, subsequent encounter    2. S/P lumbar fusion    3. Spinal cord compression due to malignant neoplasm metastatic to spine ([facility] [address]    4. Multiple myeloma not having achieved remission [facility])          Plan: Patient does have appropriate follow-up with both medical and radiation oncologist later this week. Patient and family advised to obtain disc with recent imaging to take with him to that appointment. Per inpatient notes, Dr. Jason Camara recommends radiation therapy within 4-6 weeks of surgery date. Patient to return in the next 2-4 weeks for postop visit with Dr. Jason Camara, upright x-rays prior. Contact office with any questions or concerns. Followup: Return in about 3 weeks (around 7/27/2021), or if symptoms worsen or fail to improve. Prescriptions Ordered:  No orders of the defined types were placed in this encounter. Orders Placed:  Orders Placed This Encounter   Procedures    XR LUMBAR SPINE (2-3 VIEWS)     Standing Status:   Future     Standing Expiration Date:   7/6/2022     Scheduling Instructions:      STANDING AP AND LATERAL; include both femoral heads     Order Specific Question:   Reason for exam:     Answer:   please obtain T11 and below; post-op        Electronically signed by Roz Kocher, APRN - CNP on 7/6/2021 at 2:15 PM    Please note that this chart was generated using voice recognition Dragon dictation software. Although every effort was made to ensure the accuracy of this automated transcription, some errors in transcription may have occurred.

## 2021-07-06 NOTE — PATIENT INSTRUCTIONS
Schedule a Vaccine  When you qualify to receive the vaccine, call the [facility]) COVID-19 Vaccination Hotline to schedule your appointment or to get additional information about the [facility]) locations which are offering the COVID-19 vaccine. To be 94% effective, it's important that you receive two doses of one of the COVID-19 vaccines. -If you are receiving the Louise Peter vaccine, your second shot will be scheduled as close to 21 days after the first shot as possible. -If you are receiving the Moderna vaccine, your second shot will be scheduled as close to 28 days after the first shot as possible. [facility]) COVID-19 Vaccination Hotline: [phone]    Links to [facility]) website and [facility] website:    [email or web address]    [email or web address]

## 2021-07-20 ENCOUNTER — HOSPITAL ENCOUNTER (OUTPATIENT)
Age: 76
Discharge: HOME | End: 2021-07-22
Payer: MEDICARE

## 2021-07-20 ENCOUNTER — HOSPITAL ENCOUNTER (OUTPATIENT)
Dept: GENERAL RADIOLOGY | Age: 76
Discharge: HOME | End: 2021-07-22
Payer: MEDICARE

## 2021-07-20 ENCOUNTER — OFFICE VISIT (OUTPATIENT)
Dept: NEUROSURGERY | Age: 76
End: 2021-07-20

## 2021-07-20 VITALS
HEIGHT: 60 IN | BODY MASS INDEX: 18.46 KG/M2 | OXYGEN SATURATION: 99 % | DIASTOLIC BLOOD PRESSURE: 71 MMHG | WEIGHT: 94 LBS | HEART RATE: 70 BPM | SYSTOLIC BLOOD PRESSURE: 128 MMHG

## 2021-07-20 DIAGNOSIS — M84.48XD PATHOLOGICAL FRACTURE OF LUMBAR VERTEBRA WITH ROUTINE HEALING, SUBSEQUENT ENCOUNTER: ICD-10-CM

## 2021-07-20 DIAGNOSIS — G95.29 SPINAL CORD COMPRESSION DUE TO MALIGNANT NEOPLASM METASTATIC TO SPINE (HCC): ICD-10-CM

## 2021-07-20 DIAGNOSIS — M84.48XD PATHOLOGICAL FRACTURE OF LUMBAR VERTEBRA WITH ROUTINE HEALING, SUBSEQUENT ENCOUNTER: Primary | ICD-10-CM

## 2021-07-20 DIAGNOSIS — Z98.1 S/P LUMBAR FUSION: ICD-10-CM

## 2021-07-20 DIAGNOSIS — C79.51 SPINAL CORD COMPRESSION DUE TO MALIGNANT NEOPLASM METASTATIC TO SPINE (HCC): ICD-10-CM

## 2021-07-20 PROCEDURE — 99024 POSTOP FOLLOW-UP VISIT: CPT | Performed by: NEUROLOGICAL SURGERY

## 2021-07-20 PROCEDURE — 72100 X-RAY EXAM L-S SPINE 2/3 VWS: CPT

## 2021-07-20 NOTE — PROGRESS NOTES
Department of Neurosurgery                                                      Follow up visit      History Obtained From:  Patient, family member    CHIEF COMPLAINT:         No chief complaint on file. HISTORY OF PRESENT ILLNESS:       The patient is a 77 y.o. female who presents for follow up 6 weeks post-op T12-L4 fusion and L2 decompression for epidural metastatic spinal tumor. She reports no worsening or improvement of numbness of anterior thighs. Denies any pain. She reports feeling unstable at times when walking. She uses a walker occasionally. Feels weakness greater in morning than nighttime. Patients reports losing significant weight post-op. Patient also reports taking daily multivitamins. Patient is currently undergoing radiotherapy, dose 4/10. She reported being hospitalized on 6/19 for pneumonia sepsis. Left hospital 6/30.     She is doing well in general.  PAST MEDICAL HISTORY :       Past Medical History:        Diagnosis Date    Acute renal failure (ARF) ([facility] [address] 5/25/2021    Compression fracture of lumbar vertebra ([facility] [address] 5/27/2021    Essential hypertension 5/27/2021    Other hyperlipidemia 5/27/2021    Other specified hypothyroidism 5/27/2021       Past Surgical History:        Procedure Laterality Date    BACK SURGERY  06/03/2021    Procedure: L2 LAMINECTOMY, MEDIAL FASETECTOMY, LEFT L1 INFERIOR MEDIAL FASETECTOMY, LEFT L2 FORAMINOTOMY, PARITAL L3 LAMINECTOMY, FUSION T12-L4 FUSION     FINGER TRIGGER RELEASE Left     LUMBAR FUSION N/A 6/3/2021    L2 LAMINECTOMY, MEDIAL FASETECTOMY, LEFT L1 INFERIOR MEDIAL FASETECTOMY, LEFT L2 FORAMINOTOMY, PARITAL L3 LAMINECTOMY, FUSION T12-L4 FUSION performed by Gustavo Combs DO at [address] History:   Social History     Socioeconomic History    Marital status:      Spouse name: Not on file    Number of children: Not on file    Years of education: Not on file    Highest education level: Not on file   Occupational History    Not on file   Tobacco Use    Smoking status: Never Smoker    Smokeless tobacco: Never Used   Substance and Sexual Activity    Alcohol use: Not Currently    Drug use: Never    Sexual activity: Not on file   Other Topics Concern    Not on file   Social History Narrative    Not on file     Social Determinants of Health     Financial Resource Strain:     Difficulty of Paying Living Expenses:    Food Insecurity:     Worried About Running Out of Food in the Last Year:     [address] in the Last Year:    Transportation Needs:     Lack of Transportation (Medical):  Lack of Transportation (Non-Medical):    Physical Activity:     Days of Exercise per Week:     Minutes of Exercise per Session:    Stress:     Feeling of Stress :    Social Connections:     Frequency of Communication with Friends and Family:     Frequency of Social Gatherings with Friends and Family:     Attends [employer] Services:     Active Member of Clubs or Organizations:     Attends Club or Organization Meetings:     Marital Status:    Intimate Partner Violence:     Fear of Current or Ex-Partner:     Emotionally Abused:     Physically Abused:     Sexually Abused:        Family History:       Problem Relation Age of Onset    No Known Problems Mother     No Known Problems Father        Allergies:  Patient has no known allergies. Home Medications:  Prior to Admission medications    Medication Sig Start Date End Date Taking?  Authorizing Provider   metoprolol succinate (TOPROL XL) 50 MG extended release tablet Take 50 mg by mouth daily 6/19/21   Historical Provider, MD   potassium chloride (KLOR-CON M) 20 MEQ extended release tablet  6/18/21   Historical Provider, MD   amLODIPine (NORVASC) 10 MG tablet Take 10 mg by mouth daily 6/16/21   Historical Provider, MD   oxyCODONE-acetaminophen (PERCOCET) 5-325 MG per tablet  6/18/21   Historical Provider, MD   atorvastatin (LIPITOR) 10 MG tablet Take 10 mg by mouth nightly 6/16/21   Historical Provider, MD   sennosides-docusate sodium (SENOKOT-S) 8.6-50 MG tablet Take 2 tablets by mouth daily 6/8/21   Shaq Campbell DO   latanoprost (XALATAN) 0.005 % ophthalmic solution Place 1 drop into both eyes nightly    Historical Provider, MD   levothyroxine (SYNTHROID) 50 MCG tablet Take 50 mcg by mouth Daily    Historical Provider, MD   simvastatin (ZOCOR) 20 MG tablet Take 20 mg by mouth nightly    Historical Provider, MD       Current Medications:   No current facility-administered medications for this visit. PHYSICAL EXAM:       There were no vitals taken for this visit. Physical Exam     Gen: NAD  HEENT: moist mucus membranes  Cardio: RRR  Pulm: chest rise symmetrically  GI: abd soft  Ext: no edema  Skin: warm    Neuro:    AOX3  CN 2-12 grossly intact  Speech articulate  Motor 5/5  No pronator drift  Sensation symmetrical   Can semi-squat  Five millimeter scab in surgical wound          Radiology Review:  Lumbar xray 07/20: stable T12-L4 instrumentation      ASSESSMENT AND PLAN:       Patient Active Problem List   Diagnosis    Acute renal failure (ARF) (HCC)    Compression fracture of lumbar vertebra (HCC)    Essential hypertension    Other hyperlipidemia    Subclinical hypothyroidism    Pathologic lumbar vertebral fracture    Normocytic normochromic anemia    Multiple myeloma not having achieved remission (HCC)    Moderate protein-calorie malnutrition (HCC)    Spinal cord compression due to malignant neoplasm metastatic to spine (HCC)    BMI less than 19,adult    Hypokalemia    Left ventricular diastolic dysfunction    Postoperative pain    Acute blood loss as cause of postoperative anemia    Atelectasis         A/P:  This is a 77 y.o. female 6 weeks post-op T12-L4 fusion and L2 decompression for epidural metastatic spinal tumor.   multiple myeloma    Doing well    Daily application of ointment for post-op wound  No physical restrictions  Ok to resume

## 2021-07-27 PROBLEM — Z98.1 S/P LUMBAR FUSION: Status: ACTIVE | Noted: 2021-07-27

## 2021-07-28 ENCOUNTER — TELEPHONE (OUTPATIENT)
Dept: ONCOLOGY | Age: 76
End: 2021-07-28

## 2021-08-03 ENCOUNTER — VIRTUAL VISIT (OUTPATIENT)
Dept: NEUROSURGERY | Age: 76
End: 2021-08-03

## 2021-08-03 DIAGNOSIS — G95.29 SPINAL CORD COMPRESSION DUE TO MALIGNANT NEOPLASM METASTATIC TO SPINE (HCC): ICD-10-CM

## 2021-08-03 DIAGNOSIS — M84.48XD PATHOLOGICAL FRACTURE OF LUMBAR VERTEBRA WITH ROUTINE HEALING, SUBSEQUENT ENCOUNTER: Primary | ICD-10-CM

## 2021-08-03 DIAGNOSIS — C79.51 SPINAL CORD COMPRESSION DUE TO MALIGNANT NEOPLASM METASTATIC TO SPINE (HCC): ICD-10-CM

## 2021-08-03 DIAGNOSIS — Z98.1 S/P LUMBAR FUSION: ICD-10-CM

## 2021-08-03 DIAGNOSIS — C90.00 MULTIPLE MYELOMA NOT HAVING ACHIEVED REMISSION (HCC): ICD-10-CM

## 2021-08-03 PROCEDURE — G8400 PT W/DXA NO RESULTS DOC: HCPCS | Performed by: NEUROLOGICAL SURGERY

## 2021-08-03 PROCEDURE — 99024 POSTOP FOLLOW-UP VISIT: CPT | Performed by: NEUROLOGICAL SURGERY

## 2021-08-03 PROCEDURE — 1123F ACP DISCUSS/DSCN MKR DOCD: CPT | Performed by: NEUROLOGICAL SURGERY

## 2021-08-03 PROCEDURE — 3017F COLORECTAL CA SCREEN DOC REV: CPT | Performed by: NEUROLOGICAL SURGERY

## 2021-08-03 PROCEDURE — G8427 DOCREV CUR MEDS BY ELIG CLIN: HCPCS | Performed by: NEUROLOGICAL SURGERY

## 2021-08-03 PROCEDURE — 1036F TOBACCO NON-USER: CPT | Performed by: NEUROLOGICAL SURGERY

## 2021-08-03 PROCEDURE — 4040F PNEUMOC VAC/ADMIN/RCVD: CPT | Performed by: NEUROLOGICAL SURGERY

## 2021-08-03 PROCEDURE — 1090F PRES/ABSN URINE INCON ASSESS: CPT | Performed by: NEUROLOGICAL SURGERY

## 2021-08-03 PROCEDURE — G8419 CALC BMI OUT NRM PARAM NOF/U: HCPCS | Performed by: NEUROLOGICAL SURGERY

## 2021-08-03 RX ORDER — LISINOPRIL 20 MG/1
20 TABLET ORAL DAILY
COMMUNITY
Start: 2021-07-28

## 2021-08-03 RX ORDER — DEXAMETHASONE 4 MG/1
20 TABLET ORAL WEEKLY
COMMUNITY
Start: 2021-07-15

## 2021-08-03 RX ORDER — LORAZEPAM 1 MG/1
TABLET ORAL
COMMUNITY
Start: 2021-07-09

## 2021-08-03 RX ORDER — LENALIDOMIDE 15 MG/1
CAPSULE ORAL
COMMUNITY
Start: 2021-07-19 | End: 2021-08-03 | Stop reason: SDUPTHER

## 2021-08-03 RX ORDER — LENALIDOMIDE 15 MG/1
15 CAPSULE ORAL
COMMUNITY
Start: 2021-07-09

## 2021-08-03 RX ORDER — PROCHLORPERAZINE MALEATE 10 MG
TABLET ORAL
COMMUNITY
Start: 2021-07-15

## 2021-08-03 RX ORDER — ACYCLOVIR 400 MG/1
400 TABLET ORAL 2 TIMES DAILY
COMMUNITY
Start: 2021-07-15

## 2021-08-03 RX ORDER — ASPIRIN 81 MG/1
162 TABLET ORAL DAILY
COMMUNITY
Start: 2021-07-15

## 2021-08-03 RX ORDER — SENNA PLUS 8.6 MG/1
8.6 TABLET ORAL PRN
COMMUNITY
End: 2021-11-23

## 2021-08-03 NOTE — PROGRESS NOTES
8/3/2021    TELEHEALTH EVALUATION -- Audio/Visual (During [id number] public health emergency)    HPI:    Wicho Ryder (:  1945) has requested an audio/video evaluation for the following concern(s):    Follow up 2 months post L2 laminectomy, medial facetectomy, left L1 medial facetectomy, left L2 foraminotomy, paritial L3 laminectomy, and T12-L4 fusion for pathologic fracture L2. She report feeling well  Wound has totally healed    Review of Systems    Prior to Visit Medications    Medication Sig Taking? Authorizing Provider   acyclovir (ZOVIRAX) 400 MG tablet Take 400 mg by mouth 2 times daily Yes Historical Provider, MD   apixaban starter pack (ELIQUIS) 5 MG TBPK tablet Take 2 tablets by mouth twice daily for 7 days, then take 1 tablet twice daily Yes Historical Provider, MD   aspirin 81 MG EC tablet Take 162 mg by mouth daily Yes Historical Provider, MD   dexamethasone (DECADRON) 4 MG tablet Take 20 mg by mouth once a week Yes Historical Provider, MD   lenalidomide (REVLIMID) 15 MG chemo capsule Take 15 mg by mouth every 48 hours Yes Historical Provider, MD   lisinopril (PRINIVIL;ZESTRIL) 20 MG tablet Take 20 mg by mouth daily Yes Historical Provider, MD   LORazepam (ATIVAN) 1 MG tablet  Yes Historical Provider, MD   prochlorperazine (COMPAZINE) 10 MG tablet Take 1 tablet by mouth every 6 hours as needed for mild nausea or vomiting.  Yes Historical Provider, MD   senna (SENOKOT) 8.6 MG tablet Take 8.6 mg by mouth as needed Yes Historical Provider, MD   metoprolol succinate (TOPROL XL) 50 MG extended release tablet Take 50 mg by mouth daily Yes Historical Provider, MD   potassium chloride (KLOR-CON M) 20 MEQ extended release tablet  Yes Historical Provider, MD   amLODIPine (NORVASC) 10 MG tablet Take 10 mg by mouth daily Yes Historical Provider, MD   oxyCODONE-acetaminophen (PERCOCET) 5-325 MG per tablet  Yes Historical Provider, MD   atorvastatin (LIPITOR) 10 MG tablet Take 10 mg by mouth nightly Yes Historical Provider, MD   sennosides-docusate sodium (SENOKOT-S) 8.6-50 MG tablet Take 2 tablets by mouth daily Yes Allen Hurd DO   latanoprost (XALATAN) 0.005 % ophthalmic solution Place 1 drop into both eyes nightly Yes Historical Provider, MD   levothyroxine (SYNTHROID) 50 MCG tablet Take 50 mcg by mouth Daily Yes Historical Provider, MD   simvastatin (ZOCOR) 20 MG tablet Take 20 mg by mouth nightly Yes Historical Provider, MD       Social History     Tobacco Use    Smoking status: Never Smoker    Smokeless tobacco: Never Used   Substance Use Topics    Alcohol use: Not Currently    Drug use: Never        No Known Allergies,   Past Medical History:   Diagnosis Date    Acute renal failure (ARF) ([address] 5/25/2021    Compression fracture of lumbar vertebra ([facility] [address] 5/27/2021    Essential hypertension 5/27/2021    Other hyperlipidemia 5/27/2021    Other specified hypothyroidism 5/27/2021   ,   Past Surgical History:   Procedure Laterality Date    BACK SURGERY  06/03/2021    Procedure: L2 LAMINECTOMY, MEDIAL FASETECTOMY, LEFT L1 INFERIOR MEDIAL FASETECTOMY, LEFT L2 FORAMINOTOMY, PARITAL L3 LAMINECTOMY, FUSION T12-L4 FUSION     FINGER TRIGGER RELEASE Left     LUMBAR FUSION N/A 6/3/2021    L2 LAMINECTOMY, MEDIAL FASETECTOMY, LEFT L1 INFERIOR MEDIAL FASETECTOMY, LEFT L2 FORAMINOTOMY, PARITAL L3 LAMINECTOMY, FUSION T12-L4 FUSION performed by Matthew Jasmine DO at [facility]   ,   Social History     Tobacco Use    Smoking status: Never Smoker    Smokeless tobacco: Never Used   Substance Use Topics    Alcohol use: Not Currently    Drug use: Never   ,   Family History   Problem Relation Age of Onset    No Known Problems Mother     No Known Problems Father    ,   There is no immunization history on file for this patient.,   Health Maintenance   Topic Date Due    Hepatitis C screen  Never done    Lipid screen  Never done    COVID-19 Vaccine (1) Never done    DTaP/Tdap/Td vaccine (1 - Tdap) Never done   Julieta Colon cancer screen colonoscopy  Never done    Shingles Vaccine (1 of 2) Never done    DEXA (modify frequency per FRAX score)  Never done    Pneumococcal 65+ yrs at Risk Vaccine (1 of 2 - PCV13) Never done   [employer] Visit (AWV)  Never done    Flu vaccine (1) 09/01/2021    TSH testing  05/27/2022    Potassium monitoring  06/05/2022    Creatinine monitoring  06/05/2022    Hepatitis A vaccine  Aged Out    Hepatitis B vaccine  Aged Out    Hib vaccine  Aged Out    Meningococcal (ACWY) vaccine  Aged Out       PHYSICAL EXAMINATION:  [ INSTRUCTIONS:  \"[x]\" Indicates a positive item  \"[]\" Indicates a negative item  -- DELETE ALL ITEMS NOT EXAMINED]  Vital Signs: (As obtained by patient/caregiver or practitioner observation)    Blood pressure-  Heart rate-    Respiratory rate-    Temperature-  Pulse oximetry-     Constitutional: [x] Appears well-developed and well-nourished [] No apparent distress      [] Abnormal-   Mental status  [x] Alert and awake  [] Oriented to person/place/time []Able to follow commands      Eyes:  EOM    []  Normal  [] Abnormal-  Sclera  []  Normal  [] Abnormal -         Discharge []  None visible  [] Abnormal -    HENT:   [] Normocephalic, atraumatic.   [] Abnormal   [] Mouth/Throat: Mucous membranes are moist.     External Ears [] Normal  [] Abnormal-     Neck: [] No visualized mass     Pulmonary/Chest: [] Respiratory effort normal.  [] No visualized signs of difficulty breathing or respiratory distress        [] Abnormal-      Musculoskeletal:   [x] Normal gait with no signs of ataxia         [] Normal range of motion of neck        [] Abnormal-       Neurological:        [] No Facial Asymmetry (Cranial nerve 7 motor function) (limited exam to video visit)          [] No gaze palsy        [] Abnormal-         Skin:        [x] No significant exanthematous lesions or discoloration noted on facial skin         [] Abnormal-            Psychiatric:       [x] Normal Affect [] No Hallucinations        [] Abnormal-     Skin: well healed wound  Other pertinent observable physical exam findings-     ASSESSMENT/PLAN:  2 months post L2 laminectomy, medial facetectomy, left L1 medial facetectomy, left L2 foraminotomy, paritial L3 laminectomy, and T12-L4 fusion for pathologic fracture L2. Multiple myeloma     Follow up in about 3 months    [facility], was evaluated through a synchronous (real-time) audio-video encounter. The patient (or guardian if applicable) is aware that this is a billable service. Verbal consent to proceed has been obtained within the past 12 months. The visit was conducted pursuant to the emergency declaration under the [facility], [address] [place] [address] authority and the [employer] and [employer] Act. Patient identification was verified, and a caregiver was present when appropriate. The patient was located in a state where the provider was credentialed to provide care. Total time spent on this encounter: [address] [place], DO on 8/9/2021 at 9:11 AM    An electronic signature was used to authenticate this note.

## 2021-11-23 ENCOUNTER — HOSPITAL ENCOUNTER (OUTPATIENT)
Dept: GENERAL RADIOLOGY | Age: 76
Discharge: HOME | End: 2021-11-25
Payer: MEDICARE

## 2021-11-23 ENCOUNTER — HOSPITAL ENCOUNTER (OUTPATIENT)
Age: 76
Discharge: HOME | End: 2021-11-25
Payer: MEDICARE

## 2021-11-23 ENCOUNTER — OFFICE VISIT (OUTPATIENT)
Dept: NEUROSURGERY | Age: 76
End: 2021-11-23
Payer: MEDICARE

## 2021-11-23 VITALS
DIASTOLIC BLOOD PRESSURE: 76 MMHG | WEIGHT: 94 LBS | HEART RATE: 86 BPM | HEIGHT: 60 IN | SYSTOLIC BLOOD PRESSURE: 149 MMHG | TEMPERATURE: 94.1 F | OXYGEN SATURATION: 98 % | BODY MASS INDEX: 18.46 KG/M2

## 2021-11-23 DIAGNOSIS — Z98.1 S/P LUMBAR FUSION: Primary | ICD-10-CM

## 2021-11-23 DIAGNOSIS — Z98.1 S/P LUMBAR FUSION: ICD-10-CM

## 2021-11-23 DIAGNOSIS — M84.48XD PATHOLOGICAL FRACTURE OF LUMBAR VERTEBRA WITH ROUTINE HEALING, SUBSEQUENT ENCOUNTER: ICD-10-CM

## 2021-11-23 PROCEDURE — 99214 OFFICE O/P EST MOD 30 MIN: CPT | Performed by: NEUROLOGICAL SURGERY

## 2021-11-23 PROCEDURE — 72120 X-RAY BEND ONLY L-S SPINE: CPT

## 2021-11-23 PROCEDURE — G8419 CALC BMI OUT NRM PARAM NOF/U: HCPCS | Performed by: NEUROLOGICAL SURGERY

## 2021-11-23 PROCEDURE — G8427 DOCREV CUR MEDS BY ELIG CLIN: HCPCS | Performed by: NEUROLOGICAL SURGERY

## 2021-11-23 PROCEDURE — 1123F ACP DISCUSS/DSCN MKR DOCD: CPT | Performed by: NEUROLOGICAL SURGERY

## 2021-11-23 PROCEDURE — G8484 FLU IMMUNIZE NO ADMIN: HCPCS | Performed by: NEUROLOGICAL SURGERY

## 2021-11-23 PROCEDURE — 1036F TOBACCO NON-USER: CPT | Performed by: NEUROLOGICAL SURGERY

## 2021-11-23 PROCEDURE — 1090F PRES/ABSN URINE INCON ASSESS: CPT | Performed by: NEUROLOGICAL SURGERY

## 2021-11-23 PROCEDURE — 4040F PNEUMOC VAC/ADMIN/RCVD: CPT | Performed by: NEUROLOGICAL SURGERY

## 2021-11-23 PROCEDURE — G8400 PT W/DXA NO RESULTS DOC: HCPCS | Performed by: NEUROLOGICAL SURGERY

## 2021-11-23 PROCEDURE — 3017F COLORECTAL CA SCREEN DOC REV: CPT | Performed by: NEUROLOGICAL SURGERY

## 2021-11-23 RX ORDER — SENNOSIDES 8.6 MG
650 CAPSULE ORAL 2 TIMES DAILY
COMMUNITY

## 2021-11-23 NOTE — PATIENT INSTRUCTIONS
Schedule a Vaccine  When you qualify to receive the vaccine, call the [facility]) COVID-19 Vaccination Hotline to schedule your appointment or to get additional information about the [facility]) locations which are offering the COVID-19 vaccine. To be 94% effective, it's important that you receive two doses of one of the COVID-19 vaccines. -If you are receiving the Louise Peter vaccine, your second shot will be scheduled as close to 21 days after the first shot as possible. -If you are receiving the Moderna vaccine, your second shot will be scheduled as close to 28 days after the first shot as possible. [facility]) COVID-19 Vaccination Hotline: [phone]    Links to [facility]) website and [facility] [place] [facility] website:    [email or web address]    [email or web address]

## 2021-11-23 NOTE — PROGRESS NOTES
Department of Neurosurgery                                                      Follow up visit      History Obtained From:  patient    CHIEF COMPLAINT:         6 month post op follow    HISTORY OF PRESENT ILLNESS:       The patient is a 77 y.o. female who presents for follow up 6 months post lumbar laminectomy, foraminotomy, facetectomy and fusion for pathologic L2 fracture secondary to multiple myeloma    She reports her legs and back are doing well. Does have some feet numbness due to neuropathy from chemotherapy but the numbness in her thighs has resolved. She sleeps on her side due to slight back discomfort. Received radiation in July    She is currently getting chemotherapy    She reports her energy is good, she has not lost her appetite or lost weight.     PAST MEDICAL HISTORY :       Past Medical History:        Diagnosis Date    Acute renal failure (ARF) ([facility] 5/25/2021    Compression fracture of lumbar vertebra ([facility] 5/27/2021    Essential hypertension 5/27/2021    Other hyperlipidemia 5/27/2021    Other specified hypothyroidism 5/27/2021       Past Surgical History:        Procedure Laterality Date    BACK SURGERY  06/03/2021    Procedure: L2 LAMINECTOMY, MEDIAL FASETECTOMY, LEFT L1 INFERIOR MEDIAL FASETECTOMY, LEFT L2 FORAMINOTOMY, PARITAL L3 LAMINECTOMY, FUSION T12-L4 FUSION     FINGER TRIGGER RELEASE Left     LUMBAR FUSION N/A 6/3/2021    L2 LAMINECTOMY, MEDIAL FASETECTOMY, LEFT L1 INFERIOR MEDIAL FASETECTOMY, LEFT L2 FORAMINOTOMY, PARITAL L3 LAMINECTOMY, FUSION T12-L4 FUSION performed by Aixa Perales DO at [address] History:   Social History     Socioeconomic History    Marital status:      Spouse name: Not on file    Number of children: Not on file    Years of education: Not on file    Highest education level: Not on file   Occupational History    Not on file   Tobacco Use    Smoking status: Never Smoker    Smokeless tobacco: Never Used   Substance and Sexual Activity    Alcohol use: Not Currently    Drug use: Never    Sexual activity: Not on file   Other Topics Concern    Not on file   Social History Narrative    Not on file     Social Determinants of Health     Financial Resource Strain:     Difficulty of Paying Living Expenses: Not on file   Food Insecurity:     Worried About Running Out of Food in the Last Year: Not on file    [place] of Food in the Last Year: Not on file   Transportation Needs:     Lack of Transportation (Medical): Not on file    Lack of Transportation (Non-Medical): Not on file   Physical Activity:     Days of Exercise per Week: Not on file    Minutes of Exercise per Session: Not on file   Stress:     Feeling of Stress : Not on file   Social Connections:     Frequency of Communication with Friends and Family: Not on file    Frequency of Social Gatherings with Friends and Family: Not on file    Attends [employer] Services: Not on file    Active Member of [address] [place] [address] or Organizations: Not on file    Attends Club or Organization Meetings: Not on file    Marital Status: Not on file   Intimate Partner Violence:     Fear of Current or Ex-Partner: Not on file    Emotionally Abused: Not on file    Physically Abused: Not on file    Sexually Abused: Not on file   Housing Stability:     Unable to Pay for Housing in the Last Year: Not on file    Number of [place] in the Last Year: Not on file    Unstable Housing in the Last Year: Not on file       Family History:       Problem Relation Age of Onset    No Known Problems Mother     No Known Problems Father        Allergies:  Patient has no known allergies. Home Medications:  Prior to Admission medications    Medication Sig Start Date End Date Taking?  Authorizing Provider   acyclovir (ZOVIRAX) 400 MG tablet Take 400 mg by mouth 2 times daily 7/15/21   Historical Provider, MD   apixaban starter pack (ELIQUIS) 5 MG TBPK tablet Take 2 tablets by mouth twice daily for 7 days, then take 1 tablet twice daily 7/28/21   Historical Provider, MD   aspirin 81 MG EC tablet Take 162 mg by mouth daily 7/15/21   Historical Provider, MD   dexamethasone (DECADRON) 4 MG tablet Take 20 mg by mouth once a week 7/15/21   Historical Provider, MD   lenalidomide (REVLIMID) 15 MG chemo capsule Take 15 mg by mouth every 48 hours 7/9/21   Historical Provider, MD   lisinopril (PRINIVIL;ZESTRIL) 20 MG tablet Take 20 mg by mouth daily 7/28/21   Historical Provider, MD   LORazepam (ATIVAN) 1 MG tablet  7/9/21   Historical Provider, MD   prochlorperazine (COMPAZINE) 10 MG tablet Take 1 tablet by mouth every 6 hours as needed for mild nausea or vomiting. 7/15/21   Historical Provider, MD   senna (SENOKOT) 8.6 MG tablet Take 8.6 mg by mouth as needed    Historical Provider, MD   metoprolol succinate (TOPROL XL) 50 MG extended release tablet Take 50 mg by mouth daily 6/19/21   Historical Provider, MD   potassium chloride (KLOR-CON M) 20 MEQ extended release tablet  6/18/21   Historical Provider, MD   amLODIPine (NORVASC) 10 MG tablet Take 10 mg by mouth daily 6/16/21   Historical Provider, MD   oxyCODONE-acetaminophen (PERCOCET) 5-325 MG per tablet  6/18/21   Historical Provider, MD   atorvastatin (LIPITOR) 10 MG tablet Take 10 mg by mouth nightly 6/16/21   Historical Provider, MD   sennosides-docusate sodium (SENOKOT-S) 8.6-50 MG tablet Take 2 tablets by mouth daily 6/8/21   Ebenezer Kam DO   latanoprost (XALATAN) 0.005 % ophthalmic solution Place 1 drop into both eyes nightly    Historical Provider, MD   levothyroxine (SYNTHROID) 50 MCG tablet Take 50 mcg by mouth Daily    Historical Provider, MD   simvastatin (ZOCOR) 20 MG tablet Take 20 mg by mouth nightly    Historical Provider, MD       Current Medications:   No current facility-administered medications for this visit. PHYSICAL EXAM:       There were no vitals taken for this visit.   Physical Exam     Gen: NAD  HEENT: moist mucus membranes  Cardio: RRR  Pulm: chest rise symmetrically  GI: abd soft  Ext: no edema  Skin: warm    Neuro:    AOX3  CN 2-12 grossly intact  Speech articulate  No pronator drift  Sensation symmetrical   Normal gait  Motor 5/5   She can only half squat        Radiology Review:  X-ray lumbar spine today: Stable fixation        ASSESSMENT AND PLAN:       Patient Active Problem List   Diagnosis    [facility]    Compression fracture of lumbar vertebra (HCC)    Essential hypertension    Other hyperlipidemia    Subclinical hypothyroidism    Pathologic lumbar vertebral fracture    Normocytic normochromic anemia    Multiple myeloma not having achieved remission ([facility])    Moderate protein-calorie malnutrition ([address]    Spinal cord compression due to malignant neoplasm metastatic to spine ([facility])    BMI less than 19,adult    Hypokalemia    Left ventricular diastolic dysfunction    Postoperative pain    Acute blood loss as cause of postoperative anemia    Atelectasis    S/P lumbar fusion         A/P:  This is a 77 y.o. female 6 months post lumbar laminectomy, foraminotomy, facetectomy and fusion for pathologic L2 fracture from multiple myeloma  X-ray today done shows stable fixation no hardware complication   Follow up in 6 months  lumbar CT to evaluate the completeness of fusion. Patient and/or family was counseled on the diagnosis and treatment plan  We are very happy with her progress thus far  By signing my name below, I, Eliseo Terry, attest that this documentation has been prepared under the direction and in the presence of Ina Soto DO. Electronically signed: Agatha Mclean, 11/23/21     Lon HOFFMAN, personally performed the services described in this documentation. All medical record entries made by the leonelibazam were at my direction and in my presence.  I have reviewed the chart and discharge instructions and agree that the records reflect my personal performance and is accurate and complete. Layla Romo DO. Board certified neurosurgeon 11/23/21       This note was created using voice recognition software. There may be inaccuracies of transcription  that are inadvertently overlooked prior to the signature. There is any questions about the transcription please contact me.

## 2022-05-17 ENCOUNTER — HOSPITAL ENCOUNTER (OUTPATIENT)
Dept: CT IMAGING | Age: 77
Discharge: HOME | End: 2022-05-19
Payer: MEDICARE

## 2022-05-17 DIAGNOSIS — Z98.1 S/P LUMBAR FUSION: ICD-10-CM

## 2022-05-17 PROCEDURE — 72131 CT LUMBAR SPINE W/O DYE: CPT

## 2022-06-14 ENCOUNTER — TELEMEDICINE (OUTPATIENT)
Dept: NEUROSURGERY | Age: 77
End: 2022-06-14
Payer: MEDICARE

## 2022-06-14 DIAGNOSIS — Z98.1 S/P LUMBAR FUSION: Primary | ICD-10-CM

## 2022-06-14 DIAGNOSIS — M84.48XD PATHOLOGICAL FRACTURE OF LUMBAR VERTEBRA WITH ROUTINE HEALING, SUBSEQUENT ENCOUNTER: ICD-10-CM

## 2022-06-14 PROCEDURE — G8428 CUR MEDS NOT DOCUMENT: HCPCS | Performed by: NEUROLOGICAL SURGERY

## 2022-06-14 PROCEDURE — G8419 CALC BMI OUT NRM PARAM NOF/U: HCPCS | Performed by: NEUROLOGICAL SURGERY

## 2022-06-14 PROCEDURE — 1090F PRES/ABSN URINE INCON ASSESS: CPT | Performed by: NEUROLOGICAL SURGERY

## 2022-06-14 PROCEDURE — 1036F TOBACCO NON-USER: CPT | Performed by: NEUROLOGICAL SURGERY

## 2022-06-14 PROCEDURE — 99213 OFFICE O/P EST LOW 20 MIN: CPT | Performed by: NEUROLOGICAL SURGERY

## 2022-06-14 PROCEDURE — G8400 PT W/DXA NO RESULTS DOC: HCPCS | Performed by: NEUROLOGICAL SURGERY

## 2022-06-14 PROCEDURE — 1123F ACP DISCUSS/DSCN MKR DOCD: CPT | Performed by: NEUROLOGICAL SURGERY

## 2022-07-15 NOTE — PROGRESS NOTES
2022    TELEHEALTH EVALUATION -- [facility]Visual [facility]    HPI:    Mya Parra (:  1945) has requested an audio/video evaluation for the following concern(s):    Follow-up lumbar laminectomy and fusion  Patient is doing well and has no back pain leg numbness or weakness. Review of Systems    Prior to Visit Medications    Medication Sig Taking? Authorizing Provider   Multiple Vitamin (MULTIVITAMIN ADULT PO) Take 1 capsule by mouth daily  Historical Provider, MD   acetaminophen (TYLENOL) 650 MG extended release tablet Take 650 mg by mouth 2 times daily  Historical Provider, MD   acyclovir (ZOVIRAX) 400 MG tablet Take 400 mg by mouth 2 times daily  Historical Provider, MD   apixaban starter pack (ELIQUIS) 5 MG TBPK tablet Take 2 tablets by mouth twice daily for 7 days, then take 1 tablet twice daily  Historical Provider, MD   aspirin 81 MG EC tablet Take 162 mg by mouth daily  Patient not taking: Reported on 2021  Historical Provider, MD   dexamethasone (DECADRON) 4 MG tablet Take 20 mg by mouth once a week  Historical Provider, MD   lenalidomide (REVLIMID) 15 MG chemo capsule Take 15 mg by mouth every 48 hours  Patient not taking: Reported on 2021  Historical Provider, MD   lisinopril (PRINIVIL;ZESTRIL) 20 MG tablet Take 20 mg by mouth daily  Historical Provider, MD   LORazepam (ATIVAN) 1 MG tablet   Historical Provider, MD   prochlorperazine (COMPAZINE) 10 MG tablet Take 1 tablet by mouth every 6 hours as needed for mild nausea or vomiting.   Patient not taking: Reported on 2021  Historical Provider, MD   metoprolol succinate (TOPROL XL) 50 MG extended release tablet Take 50 mg by mouth daily  Patient not taking: Reported on 2021  Historical Provider, MD   potassium chloride (KLOR-CON M) 20 MEQ extended release tablet   Historical Provider, MD   amLODIPine (NORVASC) 10 MG tablet Take 10 mg by mouth daily  Historical Provider, MD oxyCODONE-acetaminophen (PERCOCET) 5-325 MG per tablet   Historical Provider, MD   atorvastatin (LIPITOR) 10 MG tablet Take 10 mg by mouth nightly  Patient not taking: Reported on 11/23/2021  Historical Provider, MD   sennosides-docusate sodium (SENOKOT-S) 8.6-50 MG tablet Take 2 tablets by mouth daily  Hemanth Black DO   latanoprost (XALATAN) 0.005 % ophthalmic solution Place 1 drop into both eyes nightly  Historical Provider, MD   levothyroxine (SYNTHROID) 50 MCG tablet Take 50 mcg by mouth Daily  Historical Provider, MD       Social History     Tobacco Use    Smoking status: Never Smoker    Smokeless tobacco: Never Used   Substance Use Topics    Alcohol use: Not Currently    Drug use: Never        No Known Allergies,   Past Medical History:   Diagnosis Date    Acute renal failure (ARF) ([facility] [address] 5/25/2021    Compression fracture of lumbar vertebra ([facility] [address] 5/27/2021    Essential hypertension 5/27/2021    Other hyperlipidemia 5/27/2021    Other specified hypothyroidism 5/27/2021   ,   Past Surgical History:   Procedure Laterality Date    BACK SURGERY  06/03/2021    Procedure: L2 LAMINECTOMY, MEDIAL FASETECTOMY, LEFT L1 INFERIOR MEDIAL FASETECTOMY, LEFT L2 FORAMINOTOMY, PARITAL L3 LAMINECTOMY, FUSION T12-L4 FUSION     LUMBAR FUSION N/A 6/3/2021    L2 LAMINECTOMY, MEDIAL FASETECTOMY, LEFT L1 INFERIOR MEDIAL FASETECTOMY, LEFT L2 FORAMINOTOMY, PARITAL L3 LAMINECTOMY, FUSION T12-L4 FUSION performed by Jenni Mejia DO at [address]    ,   Social History     Tobacco Use    Smoking status: Never Smoker    Smokeless tobacco: Never Used   Substance Use Topics    Alcohol use: Not Currently    Drug use: Never   ,   Family History   Problem Relation Age of Onset    No Known Problems Mother     No Known Problems Father    ,   Immunization History   Administered Date(s) Administered    COVID-19, MODERNA BLUE border, Primary or Immunocompromised, (age 12y+), IM, 100 mcg/0.5mL 07/10/2021   ,   Health Maintenance   Topic Date Due    Annual Wellness Visit (AWV)  Never done    Pneumococcal 65+ years Vaccine (1 - PCV) Never done    Depression Screen  Never done    DTaP/Tdap/Td vaccine (1 - Tdap) Never done    Shingles vaccine (1 of 2) Never done    DEXA (modify frequency per FRAX score)  Never done    COVID-19 Vaccine (2 - Moderna risk 4-dose series) 08/07/2021    Flu vaccine (1) 09/01/2022    Lipids  11/08/2022    Hepatitis C screen  Completed    Hepatitis A vaccine  Aged Out    Hepatitis B vaccine  Aged Out    Hib vaccine  Aged Out    Meningococcal (ACWY) vaccine  Aged Out       PHYSICAL EXAMINATION:  [ INSTRUCTIONS:  \"[x]\" Indicates a positive item  \"[]\" Indicates a negative item  -- DELETE ALL ITEMS NOT EXAMINED]  Vital Signs: (As obtained by patient/caregiver or practitioner observation)    Blood pressure-  Heart rate-    Respiratory rate-    Temperature-  Pulse oximetry-     Constitutional: [x] Appears well-developed and well-nourished [] No apparent distress      [] Abnormal-   Mental status  [x] Alert and awake  [] Oriented to person/place/time []Able to follow commands      Eyes:  EOM    []  Normal  [] Abnormal-  Sclera  []  Normal  [] Abnormal -         Discharge []  None visible  [] Abnormal -    HENT:   [x] Normocephalic, atraumatic.   [] Abnormal   [] Mouth/Throat: Mucous membranes are moist.     External Ears [] Normal  [] Abnormal-     Neck: [] No visualized mass     Pulmonary/Chest: [] Respiratory effort normal.  [] No visualized signs of difficulty breathing or respiratory distress        [] Abnormal-      Musculoskeletal:   [x] Normal gait with no signs of ataxia         [] Normal range of motion of neck        [] Abnormal-       Neurological:        [x] No Facial Asymmetry (Cranial nerve 7 motor function) (limited exam to video visit)          [] No gaze palsy        [] Abnormal-         Skin:        [] No significant exanthematous lesions or discoloration noted on facial skin         [] Abnormal-            Psychiatric:       [x] Normal Affect [] No Hallucinations        [] Abnormal-     Other pertinent observable physical exam findings-     ASSESSMENT/PLAN:  1. S/P lumbar fusion  2. Pathological fracture of lumbar vertebra with routine healing, subsequent encounter  She is 1 year postop and she is doing well at this point no need for further routine follow-up unless new symptoms    No follow-ups on file. Philippe Osborn, was evaluated through a synchronous (real-time) audio-video encounter. The patient (or guardian if applicable) is aware that this is a billable service, which includes applicable co-pays. This Virtual Visit was conducted with patient's (and/or legal guardian's) consent. The visit was conducted pursuant to the emergency declaration under the [address] [place] [address], [address] [place] [address] authority and the [employer] and [employer] General Act. Patient identification was verified, and a caregiver was present when appropriate. The patient was located at Home: [address] [place] [address]. Provider was located at [facility] (Appt Dept): [address] [place] [address], [address] [place] [address]. Total time spent on this encounter: INGRID Lamb 115, DO on 7/15/2022 at 12:26 AM    An electronic signature was used to authenticate this note.

## 2022-09-15 NOTE — PROGRESS NOTES
Physical Therapy        Physical Therapy Cancel Note      DATE: 2021    NAME: Abida Pop  MRN: [id number]   : 1945      Patient not seen this date for Physical Therapy due to:    Patient Declined: Pt refused due to 10/10 surgical tj in lower back. Will check back as time allows.       Electronically signed by Marry Phillips PTA on 2021 at 8:50 AM +rolling walker/fair plus

## (undated) DEVICE — PROTECTOR ULN NRV PUR FOAM HK LOOP STRP ANATOMICALLY

## (undated) DEVICE — SUTURE STRATAFIX SYMMETRIC SZ 1 L18IN ABSRB VLT CT1 L36CM SXPP1A404

## (undated) DEVICE — GLOVE ORANGE PI 7   MSG9070

## (undated) DEVICE — NEEDLE HYPO 25GA L1.5IN BLU POLYPR HUB S STL REG BVL STR

## (undated) DEVICE — SPONGE,NEURO,0.5"X0.5",XR,STRL,10/PK: Brand: MEDLINE

## (undated) DEVICE — 1000 S-DRAPE TOWEL DRAPE 10/BX: Brand: STERI-DRAPE™

## (undated) DEVICE — NEEDLE SPNL 18GA L3.5IN W/ QNCKE SHARPER BVL DURA CLICK

## (undated) DEVICE — ELECTRODE PT RET AD L9FT HI MOIST COND ADH HYDRGEL CORDED

## (undated) DEVICE — SHEET, T, LAPAROTOMY, STERILE: Brand: MEDLINE

## (undated) DEVICE — DURASEAL® DURAL SEALANT SYSTEM 5ML 5 PACK: Brand: DURASEAL®

## (undated) DEVICE — DRESSING BORDERED ADH GZ UNIV GEN USE 8INX4IN AND 6INX2IN

## (undated) DEVICE — DRAPE,REIN 53X77,STERILE: Brand: MEDLINE

## (undated) DEVICE — SYRINGE IRRIG 60ML SFT PLIABLE BLB EZ TO GRP 1 HND USE W/

## (undated) DEVICE — GLOVE SURG SZ 6 THK91MIL LTX FREE SYN POLYISOPRENE ANTI

## (undated) DEVICE — GAUZE,SPONGE,FLUFF,6"X6.75",STRL,5/TRAY: Brand: MEDLINE

## (undated) DEVICE — MARKER,SKIN,WI/RULER AND LABELS: Brand: MEDLINE

## (undated) DEVICE — PACK PROCEDURE SURG LUMBAR SPINE SVMMC

## (undated) DEVICE — INTENDED FOR TISSUE SEPARATION, AND OTHER PROCEDURES THAT REQUIRE A SHARP SURGICAL BLADE TO PUNCTURE OR CUT.: Brand: BARD-PARKER ® CARBON RIB-BACK BLADES

## (undated) DEVICE — HYPODERMIC SAFETY NEEDLE: Brand: MAGELLAN

## (undated) DEVICE — BLADE ES ELASTOMERIC COAT INSUL DURABLE BEND UPTO 90DEG

## (undated) DEVICE — SPONGE,NEURO,1"X1",XR,STRL,LF,10/PK: Brand: MEDLINE

## (undated) DEVICE — GLOVE SURG SZ 65 THK91MIL LTX FREE SYN POLYISOPRENE

## (undated) DEVICE — GOWN,SIRUS,NONRNF,SETINSLV,XL,20/CS: Brand: MEDLINE

## (undated) DEVICE — SUTURE VCRL SZ 2-0 L18IN ABSRB UD CT-1 L36MM 1/2 CIR J839D

## (undated) DEVICE — SYRINGE MED 10ML LUERLOCK TIP W/O SFTY DISP

## (undated) DEVICE — C-ARMOR C-ARM EQUIPMENT COVERS CLEAR STERILE UNIVERSAL FIT 12 PER CASE: Brand: C-ARMOR

## (undated) DEVICE — DRESSING TRNSPAR W5XL4.5IN FLM SHT SEMIPERMEABLE WIND

## (undated) DEVICE — SPONGE LAP W18XL18IN WHT COT 4 PLY FLD STRUNG RADPQ DISP ST

## (undated) DEVICE — PATIENT RETURN ELECTRODE, SINGLE-USE, CONTACT QUALITY MONITORING, ADULT, WITH 9FT CORD, FOR PATIENTS WEIGING OVER 33LBS. (15KG): Brand: MEGADYNE

## (undated) DEVICE — SUTURE VCRL SZ 3-0 L18IN ABSRB UD L26MM SH 1/2 CIR J864D

## (undated) DEVICE — APPLICATOR MEDICATED 26 CC SOLUTION HI LT ORNG CHLORAPREP

## (undated) DEVICE — GOWN,AURORA,NONREINFORCED,LARGE: Brand: MEDLINE

## (undated) DEVICE — AGENT HEMOSTATIC SURGIFLOW MATRIX KIT W/THROMBIN

## (undated) DEVICE — 3.0MM PRECISION NEURO (MATCH HEAD)

## (undated) DEVICE — BLADE ES L4IN INSUL EDGE

## (undated) DEVICE — DRESSING BORDERED ADH GZ UNIV GEN USE 5IN 4IN AND 2 1/2IN

## (undated) DEVICE — Device: Brand: SNAP ON SPHERZ

## (undated) DEVICE — SUTURE VCRL SZ 0 L18IN ABSRB UD L36MM CT-1 1/2 CIR J840D

## (undated) DEVICE — TOTAL TRAY, 16FR 10ML SIL FOLEY, URN: Brand: MEDLINE

## (undated) DEVICE — Device

## (undated) DEVICE — SYRINGE MED 10ML TRNSLUC BRL PLUNG BLK MRK POLYPR CTRL

## (undated) DEVICE — 3M™ IOBAN™ 2 ANTIMICROBIAL INCISE DRAPE 6650EZ: Brand: IOBAN™ 2

## (undated) DEVICE — ADHESIVE SKIN CLSR 0.7ML TOP DERMBND ADV

## (undated) DEVICE — SUTURE NONABSORBABLE MONOFILAMENT 3-0 PS-1 18 IN BLK ETHILON 1663H

## (undated) DEVICE — PAD,NON-ADHERENT,3X8,STERILE,LF,1/PK: Brand: MEDLINE

## (undated) DEVICE — CONNECTOR TBNG WHT PLAS SUCT STR 5IN1 LTWT W/ M CONN

## (undated) DEVICE — E-Z CLEAN, NON-STICK, PTFE COATED, ELECTROSURGICAL BLADE ELECTRODE, MODIFIED EXTENDED INSULATION, 2.5 INCH (6.35 CM): Brand: MEGADYNE

## (undated) DEVICE — C-ARM: Brand: UNBRANDED

## (undated) DEVICE — KIT EVAC 0.13IN RECT TB DIA10FR 400CC PVC 3 SPR Y CONN DRN

## (undated) DEVICE — GLOVE ORANGE PI 8   MSG9080

## (undated) DEVICE — TOOL 14MH30 LEGEND 14CM 3MM: Brand: MIDAS REX ™